# Patient Record
Sex: FEMALE | Race: WHITE | Employment: OTHER | URBAN - METROPOLITAN AREA
[De-identification: names, ages, dates, MRNs, and addresses within clinical notes are randomized per-mention and may not be internally consistent; named-entity substitution may affect disease eponyms.]

---

## 2018-03-12 ENCOUNTER — HOSPITAL ENCOUNTER (OUTPATIENT)
Age: 73
Discharge: HOME | End: 2018-03-14
Payer: COMMERCIAL

## 2018-03-12 ENCOUNTER — NURSE ONLY (OUTPATIENT)
Dept: NON INVASIVE DIAGNOSTICS | Age: 73
End: 2018-03-12

## 2018-03-12 DIAGNOSIS — I48.4 ATYPICAL ATRIAL FLUTTER (HCC): ICD-10-CM

## 2018-03-12 LAB
ANION GAP SERPL CALCULATED.3IONS-SCNC: 12 MMOL/L (ref 7–16)
BUN BLDV-MCNC: 17 MG/DL (ref 8–23)
CALCIUM SERPL-MCNC: 9.2 MG/DL (ref 8.6–10.2)
CHLORIDE BLD-SCNC: 103 MMOL/L (ref 98–107)
CO2: 28 MMOL/L (ref 22–29)
CREAT SERPL-MCNC: 0.9 MG/DL (ref 0.5–1)
GFR AFRICAN AMERICAN: >60
GFR NON-AFRICAN AMERICAN: >60 ML/MIN/1.73
GLUCOSE BLD-MCNC: 110 MG/DL (ref 74–109)
POTASSIUM SERPL-SCNC: 5.6 MMOL/L (ref 3.5–5)
SODIUM BLD-SCNC: 143 MMOL/L (ref 132–146)

## 2018-03-12 PROCEDURE — 80048 BASIC METABOLIC PNL TOTAL CA: CPT

## 2018-03-13 ENCOUNTER — HOSPITAL ENCOUNTER (OUTPATIENT)
Dept: MAMMOGRAPHY | Age: 73
Discharge: HOME | End: 2018-03-15
Payer: COMMERCIAL

## 2018-03-13 DIAGNOSIS — Z12.39 BREAST CANCER SCREENING: ICD-10-CM

## 2018-03-13 PROCEDURE — 77067 SCR MAMMO BI INCL CAD: CPT

## 2018-03-20 ENCOUNTER — TELEPHONE (OUTPATIENT)
Dept: NON INVASIVE DIAGNOSTICS | Age: 73
End: 2018-03-20

## 2018-03-20 DIAGNOSIS — I48.19 PERSISTENT ATRIAL FIBRILLATION (HCC): Primary | ICD-10-CM

## 2018-07-30 ENCOUNTER — HOSPITAL ENCOUNTER (OUTPATIENT)
Dept: INFUSION THERAPY | Age: 73
Discharge: HOME | End: 2018-07-30
Payer: COMMERCIAL

## 2018-07-30 ENCOUNTER — OFFICE VISIT (OUTPATIENT)
Dept: ONCOLOGY | Age: 73
End: 2018-07-30
Payer: COMMERCIAL

## 2018-07-30 VITALS
BODY MASS INDEX: 27.88 KG/M2 | DIASTOLIC BLOOD PRESSURE: 74 MMHG | RESPIRATION RATE: 20 BRPM | TEMPERATURE: 98 F | WEIGHT: 173.5 LBS | HEART RATE: 65 BPM | SYSTOLIC BLOOD PRESSURE: 113 MMHG | HEIGHT: 66 IN

## 2018-07-30 DIAGNOSIS — C50.911 BREAST CANCER METASTASIZED TO AXILLARY LYMPH NODE, RIGHT (HCC): Primary | ICD-10-CM

## 2018-07-30 DIAGNOSIS — C50.911 CARCINOMA OF RIGHT BREAST METASTATIC TO AXILLARY LYMPH NODE (HCC): ICD-10-CM

## 2018-07-30 DIAGNOSIS — C77.3 BREAST CANCER METASTASIZED TO AXILLARY LYMPH NODE, RIGHT (HCC): Primary | ICD-10-CM

## 2018-07-30 DIAGNOSIS — C77.3 CARCINOMA OF RIGHT BREAST METASTATIC TO AXILLARY LYMPH NODE (HCC): ICD-10-CM

## 2018-07-30 DIAGNOSIS — C50.919 CARCINOMA OF BREAST METASTATIC TO AXILLARY LYMPH NODE, UNSPECIFIED LATERALITY (HCC): Primary | ICD-10-CM

## 2018-07-30 DIAGNOSIS — C77.3 CARCINOMA OF BREAST METASTATIC TO AXILLARY LYMPH NODE, UNSPECIFIED LATERALITY (HCC): Primary | ICD-10-CM

## 2018-07-30 DIAGNOSIS — D50.8 OTHER IRON DEFICIENCY ANEMIA: ICD-10-CM

## 2018-07-30 LAB
ALBUMIN SERPL-MCNC: 3.3 G/DL (ref 3.5–5.2)
ALP BLD-CCNC: 100 U/L (ref 35–104)
ALT SERPL-CCNC: 5 U/L (ref 0–32)
ANION GAP SERPL CALCULATED.3IONS-SCNC: 9 MMOL/L (ref 7–16)
ANISOCYTOSIS: ABNORMAL
AST SERPL-CCNC: 14 U/L (ref 0–31)
BASOPHILS ABSOLUTE: 0.05 E9/L (ref 0–0.2)
BASOPHILS RELATIVE PERCENT: 0.7 % (ref 0–2)
BILIRUB SERPL-MCNC: 0.6 MG/DL (ref 0–1.2)
BUN BLDV-MCNC: 12 MG/DL (ref 8–23)
BURR CELLS: ABNORMAL
CALCIUM SERPL-MCNC: 8.7 MG/DL (ref 8.6–10.2)
CHLORIDE BLD-SCNC: 103 MMOL/L (ref 98–107)
CO2: 28 MMOL/L (ref 22–29)
CREAT SERPL-MCNC: 0.8 MG/DL (ref 0.5–1)
EOSINOPHILS ABSOLUTE: 0.23 E9/L (ref 0.05–0.5)
EOSINOPHILS RELATIVE PERCENT: 3.4 % (ref 0–6)
GFR AFRICAN AMERICAN: >60
GFR NON-AFRICAN AMERICAN: >60 ML/MIN/1.73
GLUCOSE BLD-MCNC: 128 MG/DL (ref 74–109)
HCT VFR BLD CALC: 38.2 % (ref 34–48)
HEMOGLOBIN: 10.9 G/DL (ref 11.5–15.5)
HYPOCHROMIA: ABNORMAL
IMMATURE GRANULOCYTES #: 0.02 E9/L
IMMATURE GRANULOCYTES %: 0.3 % (ref 0–5)
LYMPHOCYTES ABSOLUTE: 2.01 E9/L (ref 1.5–4)
LYMPHOCYTES RELATIVE PERCENT: 29.3 % (ref 20–42)
MCH RBC QN AUTO: 23.7 PG (ref 26–35)
MCHC RBC AUTO-ENTMCNC: 28.5 % (ref 32–34.5)
MCV RBC AUTO: 83.2 FL (ref 80–99.9)
MONOCYTES ABSOLUTE: 0.58 E9/L (ref 0.1–0.95)
MONOCYTES RELATIVE PERCENT: 8.5 % (ref 2–12)
NEUTROPHILS ABSOLUTE: 3.97 E9/L (ref 1.8–7.3)
NEUTROPHILS RELATIVE PERCENT: 57.8 % (ref 43–80)
OVALOCYTES: ABNORMAL
PDW BLD-RTO: 22.4 FL (ref 11.5–15)
PLATELET # BLD: 239 E9/L (ref 130–450)
PMV BLD AUTO: 9.9 FL (ref 7–12)
POIKILOCYTES: ABNORMAL
POLYCHROMASIA: ABNORMAL
POTASSIUM SERPL-SCNC: 3.7 MMOL/L (ref 3.5–5)
RBC # BLD: 4.59 E12/L (ref 3.5–5.5)
SODIUM BLD-SCNC: 140 MMOL/L (ref 132–146)
TOTAL PROTEIN: 6.5 G/DL (ref 6.4–8.3)
WBC # BLD: 6.9 E9/L (ref 4.5–11.5)

## 2018-07-30 PROCEDURE — 2580000003 HC RX 258: Performed by: INTERNAL MEDICINE

## 2018-07-30 PROCEDURE — 99214 OFFICE O/P EST MOD 30 MIN: CPT | Performed by: INTERNAL MEDICINE

## 2018-07-30 PROCEDURE — 85025 COMPLETE CBC W/AUTO DIFF WBC: CPT

## 2018-07-30 PROCEDURE — 86300 IMMUNOASSAY TUMOR CA 15-3: CPT

## 2018-07-30 PROCEDURE — 6360000002 HC RX W HCPCS: Performed by: INTERNAL MEDICINE

## 2018-07-30 PROCEDURE — 80053 COMPREHEN METABOLIC PANEL: CPT

## 2018-07-30 RX ORDER — SODIUM CHLORIDE 0.9 % (FLUSH) 0.9 %
10 SYRINGE (ML) INJECTION PRN
Status: CANCELLED | OUTPATIENT
Start: 2018-07-30

## 2018-07-30 RX ORDER — HEPARIN SODIUM (PORCINE) LOCK FLUSH IV SOLN 100 UNIT/ML 100 UNIT/ML
500 SOLUTION INTRAVENOUS PRN
Status: CANCELLED | OUTPATIENT
Start: 2018-07-30

## 2018-07-30 RX ORDER — SODIUM CHLORIDE 0.9 % (FLUSH) 0.9 %
10 SYRINGE (ML) INJECTION PRN
Status: DISCONTINUED | OUTPATIENT
Start: 2018-07-30 | End: 2018-07-31 | Stop reason: HOSPADM

## 2018-07-30 RX ORDER — HEPARIN SODIUM (PORCINE) LOCK FLUSH IV SOLN 100 UNIT/ML 100 UNIT/ML
500 SOLUTION INTRAVENOUS PRN
Status: DISCONTINUED | OUTPATIENT
Start: 2018-07-30 | End: 2018-07-31 | Stop reason: HOSPADM

## 2018-07-30 RX ORDER — FERROUS SULFATE 325(65) MG
325 TABLET ORAL 2 TIMES DAILY
Status: ON HOLD | COMMUNITY
End: 2021-02-11 | Stop reason: HOSPADM

## 2018-07-30 RX ORDER — CEFUROXIME AXETIL 500 MG/1
500 TABLET ORAL 2 TIMES DAILY
COMMUNITY
End: 2019-01-15 | Stop reason: ALTCHOICE

## 2018-07-30 RX ADMIN — Medication 500 UNITS: at 11:07

## 2018-07-30 RX ADMIN — Medication 10 ML: at 11:07

## 2018-07-30 NOTE — PROGRESS NOTES
PORT FLUSH    Patient presents to clinic for [facility] today. 0.75 inch  SQ port accessed per policy using 20 doe Irby needle for good blood return. Aspirate for waste and specimen sent to lab. Site flushed easily with 10 mL NSS followed by 5 mL Heparin solution 100 units/ml rinse prior to de-access. Dry sterile dressing to area. Tolerated procedure well. Encouraged to schedule port flush every 4 weeks.

## 2018-07-30 NOTE — PROGRESS NOTES
taking differently: Take 20 mg by mouth daily as needed  2/26/18   Telma De La Vega APRN - CNS   PARoxetine (PAXIL) 20 MG tablet Take 1 tablet by mouth every morning 10/4/17   Jordon Hooks MD   atorvastatin (LIPITOR) 10 MG tablet Take 10 mg by mouth daily    Historical Provider, MD   lisinopril (PRINIVIL;ZESTRIL) 5 MG tablet TAKE 1 TABLET BY MOUTH ONCE DAILY 8/10/16   Vivi Obrien MD   ALPRAZolam Marlan Grieve) 0.25 MG tablet Take 0.25 mg by mouth as needed  3/24/16   Historical Provider, MD   vitamin D (CHOLECALCIFEROL) 400 UNITS TABS tablet Take 400 Units by mouth daily. Historical Provider, MD   therapeutic multivitamin-minerals (THERAGRAN-M) tablet Take 1 tablet by mouth daily. Historical Provider, MD   calcium carbonate (OSCAL) 500 MG TABS tablet Take 500 mg by mouth daily.       Historical Provider, MD    Scheduled Meds:  Continuous Infusions:  PRN Meds:.        Recent Laboratory Data-     Lab Results   Component Value Date    WBC 6.5 10/04/2017    HGB 11.9 10/04/2017    HCT 39.4 10/04/2017    MCV 88.7 10/04/2017     10/04/2017    LYMPHOPCT 33.4 10/04/2017    RBC 4.44 10/04/2017    MCH 26.8 10/04/2017    MCHC 30.2 (L) 10/04/2017    RDW 15.1 (H) 10/04/2017    NEUTOPHILPCT 48.9 10/04/2017    MONOPCT 13.9 (H) 10/04/2017    EOSPCT 3 10/06/2010    BASOPCT 0.5 10/04/2017    NEUTROABS 3.20 10/04/2017    LYMPHSABS 2.18 10/04/2017    MONOSABS 0.91 10/04/2017    EOSABS 0.20 10/04/2017    BASOSABS 0.03 10/04/2017       Lab Results   Component Value Date     03/12/2018    K 5.6 (H) 03/12/2018     03/12/2018    CO2 28 03/12/2018    BUN 17 03/12/2018    CREATININE 0.9 03/12/2018    GLUCOSE 110 (H) 03/12/2018    CALCIUM 9.2 03/12/2018    PROT 6.7 10/04/2017    LABALBU 3.4 (L) 10/04/2017    BILITOT 0.6 10/04/2017    ALKPHOS 108 (H) 10/04/2017    AST 14 10/04/2017    ALT 9 10/04/2017    LABGLOM >60 03/12/2018    GFRAA >60 03/12/2018         Lab Results   Component Value Date    IRON 51 04/11/2016 TIBC 299 04/11/2016    FERRITIN 32 04/11/2016          Ref. Range 10/1/2014 13:04 3/9/2015 12:02 8/22/2016 15:30 3/29/2017 14:44 10/4/2017 14:03   CA 15-3 Latest Ref Range: 0 - 31 U/mL 19 19 17 19 21         Lab Results   Component Value Date    CEA 0.5 03/02/2011         Radiology-  LEFT SCREENING MAMMOGRAM:  3/13/18  IMPRESSION:   No mammographic evidence of malignancy.       Screening mammogram in 1 year is recommended. ASSESSMENT/PLAN :  1- Inflammatory breast cancer at the time of her Diagnosis in June 2009, with extensive involvement of the right chest wall, axillary lymph node, right supraclavicular lymphadenopathy, with significant lymphedema. She received neoadjuvant chemotherapy with AC, followed sequentially by Taxol, carboplatin, and Herceptin, and achieved a complete remission. Then she received a course of local radiation therapy to the chest. She completed her Herceptin. She then took 5 years of anastrozole 1 mg p.o. Daily which was completed in March 2015 and is doing well without definite evidence of disease recurrence. Her mediastinal lymph nodes are likely inflammatory and reactive,she has worked in a [employer] 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  PET scan in June 2014 was negative with no abnormal metabolic uptake  Recent mammogram from June 2014 was negative and Dexa scan showed normal bone density. Follow-up mammogram in FEB 2017 was negative        2- Anemia by history ,she has been on ASA and Xarelto the past few months but denies any bleeding manifestations. Blood smear and Iron studies were consistent with iron deficiency. Stools for occult blood were positive. Her colonoscopy done in November 2014 was negative  It is likely that she has intermittent GI blood losses in relation to Xarelto. Her stools for occult blood were repeated again and were positive X3,   She did benefit from parenteral IV iron.   Her anemia is also contributed to by chronic kidney disease stage III. Should her hemoglobin drop below 10 she will benefit from erythropoietin stimulating agents with Aranesp  She was seen by cardiology and for the time being she is maintained on Xarelto. Her hemoglobin Has declined lately and is down to 10.9 today. She has been maintained on oral iron supplements. Iron studies were repeated today and are pending and if her hemoglobin continues to drop she will be considered for parenteral IV iron      .. Rod Hyatt. Jennifer Berkowitz M.D., F.A.C.P.   Electronically signed 7/30/2018 at 7:11 AM

## 2018-07-31 LAB — CA 15-3: 25 U/ML (ref 0–31)

## 2018-08-28 ENCOUNTER — OFFICE VISIT (OUTPATIENT)
Dept: NON INVASIVE DIAGNOSTICS | Age: 73
End: 2018-08-28
Payer: COMMERCIAL

## 2018-08-28 VITALS
WEIGHT: 176.4 LBS | RESPIRATION RATE: 18 BRPM | BODY MASS INDEX: 28.35 KG/M2 | DIASTOLIC BLOOD PRESSURE: 70 MMHG | HEIGHT: 66 IN | HEART RATE: 57 BPM | SYSTOLIC BLOOD PRESSURE: 110 MMHG

## 2018-08-28 DIAGNOSIS — I48.91 ATRIAL FIBRILLATION, UNSPECIFIED TYPE (HCC): ICD-10-CM

## 2018-08-28 DIAGNOSIS — R93.89 ABNORMAL RADIOGRAPH: ICD-10-CM

## 2018-08-28 DIAGNOSIS — R59.9 ADENOPATHY: ICD-10-CM

## 2018-08-28 DIAGNOSIS — I48.91 ATRIAL FIBRILLATION WITH RVR (HCC): Primary | ICD-10-CM

## 2018-08-28 DIAGNOSIS — I43 TACHYCARDIA INDUCED CARDIOMYOPATHY (HCC): ICD-10-CM

## 2018-08-28 DIAGNOSIS — R00.0 TACHYCARDIA INDUCED CARDIOMYOPATHY (HCC): ICD-10-CM

## 2018-08-28 PROCEDURE — 99213 OFFICE O/P EST LOW 20 MIN: CPT | Performed by: INTERNAL MEDICINE

## 2018-08-28 PROCEDURE — 93000 ELECTROCARDIOGRAM COMPLETE: CPT | Performed by: INTERNAL MEDICINE

## 2018-08-28 RX ORDER — FUROSEMIDE 20 MG/1
20 TABLET ORAL DAILY PRN
Qty: 30 TABLET | Refills: 5 | Status: ON HOLD | OUTPATIENT
Start: 2018-08-28 | End: 2019-01-18 | Stop reason: HOSPADM

## 2018-08-28 RX ORDER — METOPROLOL SUCCINATE 25 MG/1
25 TABLET, EXTENDED RELEASE ORAL DAILY
Qty: 90 TABLET | Refills: 3 | Status: SHIPPED | OUTPATIENT
Start: 2018-08-28 | End: 2018-08-28 | Stop reason: SDUPTHER

## 2018-08-28 RX ORDER — POTASSIUM CHLORIDE 20 MEQ/1
20 TABLET, EXTENDED RELEASE ORAL DAILY PRN
Qty: 30 TABLET | Refills: 5 | Status: ON HOLD
Start: 2018-08-28 | End: 2021-02-11 | Stop reason: HOSPADM

## 2018-08-28 RX ORDER — METOPROLOL SUCCINATE 25 MG/1
12.5 TABLET, EXTENDED RELEASE ORAL DAILY
Qty: 60 TABLET | Refills: 3 | Status: ON HOLD | OUTPATIENT
Start: 2018-08-28 | End: 2019-05-31 | Stop reason: HOSPADM

## 2018-08-28 RX ORDER — DOFETILIDE 0.25 MG/1
250 CAPSULE ORAL EVERY 12 HOURS SCHEDULED
Qty: 180 CAPSULE | Refills: 3 | Status: SHIPPED | OUTPATIENT
Start: 2018-08-28 | End: 2019-09-10 | Stop reason: SDUPTHER

## 2018-08-28 NOTE — PROGRESS NOTES
mouth every morning 90 tablet 1    atorvastatin (LIPITOR) 10 MG tablet Take 10 mg by mouth daily      lisinopril (PRINIVIL;ZESTRIL) 5 MG tablet TAKE 1 TABLET BY MOUTH ONCE DAILY 90 tablet 3    ALPRAZolam (XANAX) 0.25 MG tablet Take 0.25 mg by mouth as needed       vitamin D (CHOLECALCIFEROL) 400 UNITS TABS tablet Take 400 Units by mouth daily.  therapeutic multivitamin-minerals (THERAGRAN-M) tablet Take 1 tablet by mouth daily.  calcium carbonate (OSCAL) 500 MG TABS tablet Take 500 mg by mouth daily. No current facility-administered medications for this visit. Allergies   Allergen Reactions    Sulfa Antibiotics        SUBJECTIVE: Reinaldo Louis presents to the office today with these chronic medical conditions: Persistent AF on Tikosyn AAD therapy, previous Sotalol AAD therapy with ERAF, H/O breast carcinoma, sinus bradycardia and anemia. She has been feeling well from a device and rhythm perspective and offers no complaints today. She denies any symptoms of chest pain, shortness of breath, orthopnea or PND. She denies any palpitations or feelings of rapid heart rhythms. She does admit to occasional lower extremity edema has asked for refills of Lasix and potassium as needed. ROS:   Constitutional: Negative for fever, activity change and appetite change. HENT: Negative for epistaxis, difficulty swallowing. Eyes: Negative for blurred vision or double vision. Respiratory: Negative for cough, chest tightness, shortness of breath and wheezing. Cardiovascular: Negative for chest pain, palpitations and leg swelling. Gastrointestinal: Negative for abdominal pain, heartburn. Genitourinary: Negative for hematuria, burning or frequency. Musculoskeletal: Negative for myalgias, stiffness, or swelling. Skin: Negative for rash, pain, or lumps. Neurological: Negative for syncope, seizures, or headaches, dizziness  Psychiatric/Behavioral: Negative for confusion and agitation.  The patient is not nervous/anxious. PHYSICAL EXAM:  Vitals:    08/28/18 1109   BP: 110/70   Pulse: 57   Resp: 18   Weight: 176 lb 6.4 oz (80 kg)   Height: 5' 6\" (1.676 m)     Constitutional: Oriented to person, place, and time. Well-developed and cooperative. Head: Normocephalic and atraumatic. Eyes: Conjunctivae are normal.    Neck: No hepatojugular reflux and no JVD present. Cardiovascular: S1 normal, S2 normal and intact distal pulses. A regular bradycardic rhythm present. Pulmonary/Chest: Effort normal and breath sounds normal. No respiratory distress. Abdominal: Soft. Normal appearance and bowel sounds are normal.  No tenderness. Musculoskeletal: Normal range of motion of all extremities, no muscle weakness. Neurological: Alert and oriented to person, place, and time. Gait normal.   Skin: Skin is warm and dry. No bruising, no ecchymosis and no rash noted. Extremity: No clubbing or cyanosis. RUE lymphedema  Psychiatric: Normal mood and affect. Thought content normal.     EKG 8/28/18: Sinus bradycardia at 57 bpm, NAD, Old AWMI, T-wave abnormality, QTc: 487 ms. I have independently reviewed all of the ECGs as per above. I have reviewed all progress notes & records from the time of the patient's last office visit up to present. Impression:     1. Persistent atrial fibrillation with RVR   - history of prior sotalol AAD therapy with ERAF.  - Xarelto OAC.  - [id number] score 2.  - s/p CV on 9/19/16  - Tikosyn initiation 11/14/16  - s/p CV 11/16/16  - QTc is stable    2. 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 Normal coronary arteries. Global LV hypokinesis, EF 40%. 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  - Sotalol d/c'd - Toprol XL resumed  - Repeat echocardiogram(May 2014): EF 55%.   - Repeat echocardiogram (6/24/15): EF >55%, LVPW(d) 1 cm and

## 2018-09-19 ENCOUNTER — HOSPITAL ENCOUNTER (OUTPATIENT)
Age: 73
Discharge: HOME | End: 2018-09-21
Payer: COMMERCIAL

## 2018-09-19 LAB
BUN BLDV-MCNC: 15 MG/DL (ref 8–23)
CREAT SERPL-MCNC: 1 MG/DL (ref 0.5–1)
GFR AFRICAN AMERICAN: >60
GFR NON-AFRICAN AMERICAN: 55 ML/MIN/1.73

## 2018-09-19 PROCEDURE — 82565 ASSAY OF CREATININE: CPT

## 2018-09-19 PROCEDURE — 84520 ASSAY OF UREA NITROGEN: CPT

## 2018-09-19 PROCEDURE — 88112 CYTOPATH CELL ENHANCE TECH: CPT

## 2018-10-02 ENCOUNTER — HOSPITAL ENCOUNTER (OUTPATIENT)
Dept: CT IMAGING | Age: 73
Discharge: HOME | End: 2018-10-04
Payer: COMMERCIAL

## 2018-10-02 DIAGNOSIS — R31.21 ASYMPTOMATIC MICROSCOPIC HEMATURIA: ICD-10-CM

## 2018-10-02 PROCEDURE — 74178 CT ABD&PLV WO CNTR FLWD CNTR: CPT

## 2018-10-02 PROCEDURE — 6360000004 HC RX CONTRAST MEDICATION: Performed by: RADIOLOGY

## 2018-10-02 RX ADMIN — IOPAMIDOL 110 ML: 755 INJECTION, SOLUTION INTRAVENOUS at 13:00

## 2018-10-11 ENCOUNTER — OFFICE VISIT (OUTPATIENT)
Dept: CARDIOLOGY CLINIC | Age: 73
End: 2018-10-11
Payer: COMMERCIAL

## 2018-10-11 VITALS
WEIGHT: 175.2 LBS | DIASTOLIC BLOOD PRESSURE: 68 MMHG | SYSTOLIC BLOOD PRESSURE: 112 MMHG | RESPIRATION RATE: 16 BRPM | HEIGHT: 66 IN | BODY MASS INDEX: 28.16 KG/M2 | HEART RATE: 79 BPM

## 2018-10-11 DIAGNOSIS — I42.8 NICM (NONISCHEMIC CARDIOMYOPATHY) (HCC): ICD-10-CM

## 2018-10-11 DIAGNOSIS — Z79.01 CHRONIC ANTICOAGULATION: ICD-10-CM

## 2018-10-11 DIAGNOSIS — I48.19 PERSISTENT ATRIAL FIBRILLATION (HCC): Primary | ICD-10-CM

## 2018-10-11 DIAGNOSIS — I48.91 ATRIAL FIBRILLATION WITH RVR (HCC): ICD-10-CM

## 2018-10-11 DIAGNOSIS — Z01.810 PREOP CARDIOVASCULAR EXAM: ICD-10-CM

## 2018-10-11 PROCEDURE — 93000 ELECTROCARDIOGRAM COMPLETE: CPT | Performed by: INTERNAL MEDICINE

## 2018-10-11 PROCEDURE — 99214 OFFICE O/P EST MOD 30 MIN: CPT | Performed by: INTERNAL MEDICINE

## 2018-10-11 NOTE — PROGRESS NOTES
OUTPATIENT CARDIOLOGY PROGRESS NOTE    Catina Gamboa  1945     Date of Service: 10/11/18     Referring Provider/PCP: Corie Grullon DO     Chief Complaint: Preoperative cardiac evaluation (screening colonoscopy)    Patient Active Problem List    Diagnosis Date Noted    Atrial fibrillation with RVR ([facility] 04/07/2014     Priority: High     Overview Note:     A). S/P MINE/CV 4/4/14: initial conversion to SR then back into AF   B). Initiation of Sotalol AA drug therapy s/p CV  C). Repeat successful CV post AA drug loading 4/7/14  D). 24-hour Holter(8/3/15): PAF, SR/SB, No AVB, no evidence of chronotropic incompetence. No significant pauses. E). ERAF on sotalol  F) Current Tikosyn AAD therapy    Chronic anticoagulation 05/15/2014     Priority: Medium     Overview Note:     A). Xarelto      Breast cancer metastasized to axillary lymph node ([facility] 03/26/2015    Iron deficiency anemia 03/16/2015    Dyspnea on exertion 04/07/2014    Adenopathy 06/19/2012    Malignant neoplasm of breast ([facility] 06/19/2012     Overview Note:     replace inactive diagnosis         Current Outpatient Prescriptions   Medication Sig Dispense Refill    dofetilide (TIKOSYN) 250 MCG capsule Take 1 capsule by mouth every 12 hours 180 capsule 3    furosemide (LASIX) 20 MG tablet Take 1 tablet by mouth daily as needed (edema) 30 tablet 5    potassium chloride (KLOR-CON M) 20 MEQ extended release tablet Take 1 tablet by mouth daily as needed (take with Lasix) 30 tablet 5    ferrous sulfate 325 (65 Fe) MG tablet Take 325 mg by mouth 2 times daily      rivaroxaban (XARELTO) 20 MG TABS tablet Take 1 tablet by mouth daily.  90 tablet 3    PARoxetine (PAXIL) 20 MG tablet Take 1 tablet by mouth every morning 90 tablet 1    atorvastatin (LIPITOR) 10 MG tablet Take 10 mg by mouth daily      lisinopril (PRINIVIL;ZESTRIL) 5 MG tablet TAKE 1 TABLET BY MOUTH ONCE DAILY 90 tablet 3    ALPRAZolam (XANAX) 0.25 MG tablet Take 0.25 mg by mouth as needed

## 2018-11-14 ENCOUNTER — TELEPHONE (OUTPATIENT)
Dept: CARDIOLOGY CLINIC | Age: 73
End: 2018-11-14

## 2018-11-14 NOTE — TELEPHONE ENCOUNTER
[facility] states that Kandace Stapleton can have colonoscopy. Under the assessment you have she is to continue medications. They need to have it specifically stated that she can stop her Xarelto for 2 days prior to EGD and colonoscopy , they are going to have her bridge with asa,  both scheduled the same day.

## 2019-01-15 ENCOUNTER — HOSPITAL ENCOUNTER (INPATIENT)
Age: 74
LOS: 3 days | Discharge: HOME | DRG: 292 | End: 2019-01-18
Attending: EMERGENCY MEDICINE | Admitting: FAMILY MEDICINE
Payer: COMMERCIAL

## 2019-01-15 ENCOUNTER — APPOINTMENT (OUTPATIENT)
Dept: ULTRASOUND IMAGING | Age: 74
DRG: 292 | End: 2019-01-15
Payer: COMMERCIAL

## 2019-01-15 ENCOUNTER — APPOINTMENT (OUTPATIENT)
Dept: GENERAL RADIOLOGY | Age: 74
DRG: 292 | End: 2019-01-15
Payer: COMMERCIAL

## 2019-01-15 DIAGNOSIS — I48.91 ATRIAL FIBRILLATION WITH RVR (HCC): Primary | ICD-10-CM

## 2019-01-15 DIAGNOSIS — I50.9 ACUTE CONGESTIVE HEART FAILURE, UNSPECIFIED HEART FAILURE TYPE (HCC): ICD-10-CM

## 2019-01-15 PROBLEM — J90 BILATERAL PLEURAL EFFUSION: Status: ACTIVE | Noted: 2019-01-15

## 2019-01-15 LAB
ALBUMIN SERPL-MCNC: 3.9 G/DL (ref 3.5–5.2)
ALP BLD-CCNC: 73 U/L (ref 35–104)
ALT SERPL-CCNC: 11 U/L (ref 0–32)
ANION GAP SERPL CALCULATED.3IONS-SCNC: 14 MMOL/L (ref 7–16)
ANISOCYTOSIS: ABNORMAL
APTT: 41.1 SEC (ref 24.5–35.1)
AST SERPL-CCNC: 20 U/L (ref 0–31)
BASOPHILS ABSOLUTE: 0.04 E9/L (ref 0–0.2)
BASOPHILS RELATIVE PERCENT: 0.6 % (ref 0–2)
BILIRUB SERPL-MCNC: 1.2 MG/DL (ref 0–1.2)
BUN BLDV-MCNC: 22 MG/DL (ref 8–23)
CALCIUM SERPL-MCNC: 9.3 MG/DL (ref 8.6–10.2)
CHLORIDE BLD-SCNC: 103 MMOL/L (ref 98–107)
CO2: 26 MMOL/L (ref 22–29)
CREAT SERPL-MCNC: 0.8 MG/DL (ref 0.5–1)
EOSINOPHILS ABSOLUTE: 0.04 E9/L (ref 0.05–0.5)
EOSINOPHILS RELATIVE PERCENT: 0.6 % (ref 0–6)
GFR AFRICAN AMERICAN: >60
GFR NON-AFRICAN AMERICAN: >60 ML/MIN/1.73
GLUCOSE BLD-MCNC: 95 MG/DL (ref 74–99)
HCT VFR BLD CALC: 50.9 % (ref 34–48)
HEMOGLOBIN: 14.7 G/DL (ref 11.5–15.5)
IMMATURE GRANULOCYTES #: 0.03 E9/L
IMMATURE GRANULOCYTES %: 0.4 % (ref 0–5)
INR BLD: 3.9
LYMPHOCYTES ABSOLUTE: 1.63 E9/L (ref 1.5–4)
LYMPHOCYTES RELATIVE PERCENT: 23.2 % (ref 20–42)
MCH RBC QN AUTO: 27.8 PG (ref 26–35)
MCHC RBC AUTO-ENTMCNC: 28.9 % (ref 32–34.5)
MCV RBC AUTO: 96.4 FL (ref 80–99.9)
MONOCYTES ABSOLUTE: 0.63 E9/L (ref 0.1–0.95)
MONOCYTES RELATIVE PERCENT: 8.9 % (ref 2–12)
NEUTROPHILS ABSOLUTE: 4.67 E9/L (ref 1.8–7.3)
NEUTROPHILS RELATIVE PERCENT: 66.3 % (ref 43–80)
OVALOCYTES: ABNORMAL
PDW BLD-RTO: 18.5 FL (ref 11.5–15)
PLATELET # BLD: 184 E9/L (ref 130–450)
PMV BLD AUTO: 9.9 FL (ref 7–12)
POIKILOCYTES: ABNORMAL
POLYCHROMASIA: ABNORMAL
POTASSIUM REFLEX MAGNESIUM: 4.5 MMOL/L (ref 3.5–5)
PRO-BNP: 4647 PG/ML (ref 0–125)
PROTHROMBIN TIME: 43.1 SEC (ref 9.3–12.4)
RBC # BLD: 5.28 E12/L (ref 3.5–5.5)
SODIUM BLD-SCNC: 143 MMOL/L (ref 132–146)
TOTAL PROTEIN: 7.2 G/DL (ref 6.4–8.3)
TROPONIN: <0.01 NG/ML (ref 0–0.03)
TSH SERPL DL<=0.05 MIU/L-ACNC: 2.14 UIU/ML (ref 0.27–4.2)
WBC # BLD: 7 E9/L (ref 4.5–11.5)

## 2019-01-15 PROCEDURE — 6370000000 HC RX 637 (ALT 250 FOR IP): Performed by: FAMILY MEDICINE

## 2019-01-15 PROCEDURE — 2500000003 HC RX 250 WO HCPCS: Performed by: NURSE PRACTITIONER

## 2019-01-15 PROCEDURE — 2060000000 HC ICU INTERMEDIATE R&B

## 2019-01-15 PROCEDURE — 85025 COMPLETE CBC W/AUTO DIFF WBC: CPT

## 2019-01-15 PROCEDURE — 96376 TX/PRO/DX INJ SAME DRUG ADON: CPT

## 2019-01-15 PROCEDURE — 96375 TX/PRO/DX INJ NEW DRUG ADDON: CPT

## 2019-01-15 PROCEDURE — 6360000002 HC RX W HCPCS: Performed by: FAMILY MEDICINE

## 2019-01-15 PROCEDURE — 2580000003 HC RX 258: Performed by: FAMILY MEDICINE

## 2019-01-15 PROCEDURE — 85730 THROMBOPLASTIN TIME PARTIAL: CPT

## 2019-01-15 PROCEDURE — 84443 ASSAY THYROID STIM HORMONE: CPT

## 2019-01-15 PROCEDURE — 84484 ASSAY OF TROPONIN QUANT: CPT

## 2019-01-15 PROCEDURE — 80053 COMPREHEN METABOLIC PANEL: CPT

## 2019-01-15 PROCEDURE — 93971 EXTREMITY STUDY: CPT

## 2019-01-15 PROCEDURE — 36415 COLL VENOUS BLD VENIPUNCTURE: CPT

## 2019-01-15 PROCEDURE — 2500000003 HC RX 250 WO HCPCS: Performed by: EMERGENCY MEDICINE

## 2019-01-15 PROCEDURE — 71045 X-RAY EXAM CHEST 1 VIEW: CPT

## 2019-01-15 PROCEDURE — 85610 PROTHROMBIN TIME: CPT

## 2019-01-15 PROCEDURE — 2580000003 HC RX 258: Performed by: EMERGENCY MEDICINE

## 2019-01-15 PROCEDURE — 96365 THER/PROPH/DIAG IV INF INIT: CPT

## 2019-01-15 PROCEDURE — 83880 ASSAY OF NATRIURETIC PEPTIDE: CPT

## 2019-01-15 PROCEDURE — 99285 EMERGENCY DEPT VISIT HI MDM: CPT

## 2019-01-15 RX ORDER — DILTIAZEM HYDROCHLORIDE 5 MG/ML
10 INJECTION INTRAVENOUS ONCE
Status: DISCONTINUED | OUTPATIENT
Start: 2019-01-15 | End: 2019-01-15

## 2019-01-15 RX ORDER — ONDANSETRON 2 MG/ML
4 INJECTION INTRAMUSCULAR; INTRAVENOUS EVERY 6 HOURS PRN
Status: DISCONTINUED | OUTPATIENT
Start: 2019-01-15 | End: 2019-01-18 | Stop reason: HOSPADM

## 2019-01-15 RX ORDER — DILTIAZEM HYDROCHLORIDE 5 MG/ML
20 INJECTION INTRAVENOUS ONCE
Status: COMPLETED | OUTPATIENT
Start: 2019-01-15 | End: 2019-01-15

## 2019-01-15 RX ORDER — FAMOTIDINE 20 MG/1
20 TABLET, FILM COATED ORAL 2 TIMES DAILY
Status: DISCONTINUED | OUTPATIENT
Start: 2019-01-15 | End: 2019-01-18 | Stop reason: HOSPADM

## 2019-01-15 RX ORDER — SODIUM CHLORIDE 0.9 % (FLUSH) 0.9 %
10 SYRINGE (ML) INJECTION EVERY 12 HOURS SCHEDULED
Status: DISCONTINUED | OUTPATIENT
Start: 2019-01-15 | End: 2019-01-18 | Stop reason: HOSPADM

## 2019-01-15 RX ORDER — DOFETILIDE 0.25 MG/1
250 CAPSULE ORAL EVERY 12 HOURS SCHEDULED
Status: DISCONTINUED | OUTPATIENT
Start: 2019-01-15 | End: 2019-01-18 | Stop reason: HOSPADM

## 2019-01-15 RX ORDER — LISINOPRIL 5 MG/1
5 TABLET ORAL DAILY
Status: DISCONTINUED | OUTPATIENT
Start: 2019-01-16 | End: 2019-01-18 | Stop reason: HOSPADM

## 2019-01-15 RX ORDER — ATORVASTATIN CALCIUM 10 MG/1
10 TABLET, FILM COATED ORAL NIGHTLY
Status: DISCONTINUED | OUTPATIENT
Start: 2019-01-15 | End: 2019-01-18 | Stop reason: HOSPADM

## 2019-01-15 RX ORDER — FUROSEMIDE 10 MG/ML
40 INJECTION INTRAMUSCULAR; INTRAVENOUS 2 TIMES DAILY
Status: DISCONTINUED | OUTPATIENT
Start: 2019-01-15 | End: 2019-01-17 | Stop reason: ALTCHOICE

## 2019-01-15 RX ORDER — SODIUM CHLORIDE 0.9 % (FLUSH) 0.9 %
10 SYRINGE (ML) INJECTION PRN
Status: DISCONTINUED | OUTPATIENT
Start: 2019-01-15 | End: 2019-01-18 | Stop reason: HOSPADM

## 2019-01-15 RX ORDER — PAROXETINE HYDROCHLORIDE 20 MG/1
20 TABLET, FILM COATED ORAL EVERY MORNING
Status: DISCONTINUED | OUTPATIENT
Start: 2019-01-16 | End: 2019-01-18 | Stop reason: HOSPADM

## 2019-01-15 RX ORDER — POTASSIUM CHLORIDE 20 MEQ/1
20 TABLET, EXTENDED RELEASE ORAL DAILY PRN
Status: DISCONTINUED | OUTPATIENT
Start: 2019-01-15 | End: 2019-01-18 | Stop reason: HOSPADM

## 2019-01-15 RX ORDER — BUMETANIDE 0.25 MG/ML
1 INJECTION, SOLUTION INTRAMUSCULAR; INTRAVENOUS ONCE
Status: COMPLETED | OUTPATIENT
Start: 2019-01-15 | End: 2019-01-15

## 2019-01-15 RX ORDER — ALPRAZOLAM 0.25 MG/1
0.25 TABLET ORAL 3 TIMES DAILY PRN
Status: DISCONTINUED | OUTPATIENT
Start: 2019-01-15 | End: 2019-01-18 | Stop reason: HOSPADM

## 2019-01-15 RX ORDER — LISINOPRIL 5 MG/1
1 TABLET ORAL DAILY
Status: DISCONTINUED | OUTPATIENT
Start: 2019-01-15 | End: 2019-01-15 | Stop reason: SDUPTHER

## 2019-01-15 RX ORDER — METOPROLOL SUCCINATE 25 MG/1
12.5 TABLET, EXTENDED RELEASE ORAL EVERY MORNING
Status: DISCONTINUED | OUTPATIENT
Start: 2019-01-16 | End: 2019-01-18 | Stop reason: HOSPADM

## 2019-01-15 RX ORDER — CALCIUM CARBONATE 500(1250)
500 TABLET ORAL EVERY MORNING
Status: DISCONTINUED | OUTPATIENT
Start: 2019-01-16 | End: 2019-01-18 | Stop reason: HOSPADM

## 2019-01-15 RX ORDER — FERROUS SULFATE 325(65) MG
325 TABLET ORAL 2 TIMES DAILY
Status: DISCONTINUED | OUTPATIENT
Start: 2019-01-15 | End: 2019-01-18 | Stop reason: HOSPADM

## 2019-01-15 RX ADMIN — FERROUS SULFATE TAB 325 MG (65 MG ELEMENTAL FE) 325 MG: 325 (65 FE) TAB at 20:50

## 2019-01-15 RX ADMIN — ATORVASTATIN CALCIUM 10 MG: 10 TABLET, FILM COATED ORAL at 20:50

## 2019-01-15 RX ADMIN — BUMETANIDE 1 MG: 0.25 INJECTION INTRAMUSCULAR; INTRAVENOUS at 14:10

## 2019-01-15 RX ADMIN — Medication 10 ML: at 20:51

## 2019-01-15 RX ADMIN — DILTIAZEM HYDROCHLORIDE 20 MG: 5 INJECTION INTRAVENOUS at 13:33

## 2019-01-15 RX ADMIN — ALPRAZOLAM 0.25 MG: 0.25 TABLET ORAL at 21:00

## 2019-01-15 RX ADMIN — DILTIAZEM HYDROCHLORIDE 5 MG/HR: 5 INJECTION INTRAVENOUS at 13:42

## 2019-01-15 RX ADMIN — RIVAROXABAN 20 MG: 20 TABLET, FILM COATED ORAL at 18:34

## 2019-01-15 RX ADMIN — DOFETILIDE 250 MCG: 0.25 CAPSULE ORAL at 20:50

## 2019-01-15 RX ADMIN — FUROSEMIDE 40 MG: 10 INJECTION, SOLUTION INTRAMUSCULAR; INTRAVENOUS at 20:50

## 2019-01-15 RX ADMIN — FAMOTIDINE 20 MG: 20 TABLET ORAL at 20:54

## 2019-01-16 ENCOUNTER — TELEPHONE (OUTPATIENT)
Dept: CARDIOLOGY CLINIC | Age: 74
End: 2019-01-16

## 2019-01-16 LAB
ANION GAP SERPL CALCULATED.3IONS-SCNC: 11 MMOL/L (ref 7–16)
BUN BLDV-MCNC: 24 MG/DL (ref 8–23)
CALCIUM SERPL-MCNC: 9 MG/DL (ref 8.6–10.2)
CHLORIDE BLD-SCNC: 101 MMOL/L (ref 98–107)
CHOLESTEROL, TOTAL: 116 MG/DL (ref 0–199)
CO2: 32 MMOL/L (ref 22–29)
CREAT SERPL-MCNC: 0.9 MG/DL (ref 0.5–1)
FILM ARRAY ADENOVIRUS: NORMAL
FILM ARRAY BORDETELLA PERTUSSIS: NORMAL
FILM ARRAY CHLAMYDOPHILIA PNEUMONIAE: NORMAL
FILM ARRAY CORONAVIRUS 229E: NORMAL
FILM ARRAY CORONAVIRUS HKU1: NORMAL
FILM ARRAY CORONAVIRUS NL63: NORMAL
FILM ARRAY CORONAVIRUS OC43: NORMAL
FILM ARRAY INFLUENZA A VIRUS 09H1: NORMAL
FILM ARRAY INFLUENZA A VIRUS H1: NORMAL
FILM ARRAY INFLUENZA A VIRUS H3: NORMAL
FILM ARRAY INFLUENZA A VIRUS: NORMAL
FILM ARRAY INFLUENZA B: NORMAL
FILM ARRAY METAPNEUMOVIRUS: NORMAL
FILM ARRAY MYCOPLASMA PNEUMONIAE: NORMAL
FILM ARRAY PARAINFLUENZA VIRUS 1: NORMAL
FILM ARRAY PARAINFLUENZA VIRUS 2: NORMAL
FILM ARRAY PARAINFLUENZA VIRUS 3: NORMAL
FILM ARRAY PARAINFLUENZA VIRUS 4: NORMAL
FILM ARRAY RESPIRATORY SYNCITIAL VIRUS: NORMAL
FILM ARRAY RHINOVIRUS/ENTEROVIRUS: NORMAL
GFR AFRICAN AMERICAN: >60
GFR NON-AFRICAN AMERICAN: >60 ML/MIN/1.73
GLUCOSE BLD-MCNC: 93 MG/DL (ref 74–99)
HDLC SERPL-MCNC: 30 MG/DL
LDL CHOLESTEROL CALCULATED: 63 MG/DL (ref 0–99)
LV EF: 63 %
LVEF MODALITY: NORMAL
MAGNESIUM: 1.9 MG/DL (ref 1.6–2.6)
POTASSIUM SERPL-SCNC: 4.1 MMOL/L (ref 3.5–5)
SODIUM BLD-SCNC: 144 MMOL/L (ref 132–146)
TRIGL SERPL-MCNC: 114 MG/DL (ref 0–149)
VLDLC SERPL CALC-MCNC: 23 MG/DL

## 2019-01-16 PROCEDURE — 87633 RESP VIRUS 12-25 TARGETS: CPT

## 2019-01-16 PROCEDURE — 83735 ASSAY OF MAGNESIUM: CPT

## 2019-01-16 PROCEDURE — 2580000003 HC RX 258: Performed by: FAMILY MEDICINE

## 2019-01-16 PROCEDURE — 93306 TTE W/DOPPLER COMPLETE: CPT

## 2019-01-16 PROCEDURE — 80048 BASIC METABOLIC PNL TOTAL CA: CPT

## 2019-01-16 PROCEDURE — 2700000000 HC OXYGEN THERAPY PER DAY

## 2019-01-16 PROCEDURE — 87798 DETECT AGENT NOS DNA AMP: CPT

## 2019-01-16 PROCEDURE — 6360000002 HC RX W HCPCS: Performed by: FAMILY MEDICINE

## 2019-01-16 PROCEDURE — 80061 LIPID PANEL: CPT

## 2019-01-16 PROCEDURE — 2060000000 HC ICU INTERMEDIATE R&B

## 2019-01-16 PROCEDURE — APPSS60 APP SPLIT SHARED TIME 46-60 MINUTES: Performed by: NURSE PRACTITIONER

## 2019-01-16 PROCEDURE — 87581 M.PNEUMON DNA AMP PROBE: CPT

## 2019-01-16 PROCEDURE — 6370000000 HC RX 637 (ALT 250 FOR IP): Performed by: FAMILY MEDICINE

## 2019-01-16 PROCEDURE — 36415 COLL VENOUS BLD VENIPUNCTURE: CPT

## 2019-01-16 PROCEDURE — 87486 CHLMYD PNEUM DNA AMP PROBE: CPT

## 2019-01-16 PROCEDURE — 99223 1ST HOSP IP/OBS HIGH 75: CPT | Performed by: INTERNAL MEDICINE

## 2019-01-16 RX ADMIN — ATORVASTATIN CALCIUM 10 MG: 10 TABLET, FILM COATED ORAL at 20:46

## 2019-01-16 RX ADMIN — RIVAROXABAN 20 MG: 20 TABLET, FILM COATED ORAL at 17:30

## 2019-01-16 RX ADMIN — CALCIUM 500 MG: 500 TABLET ORAL at 09:53

## 2019-01-16 RX ADMIN — FUROSEMIDE 40 MG: 10 INJECTION, SOLUTION INTRAMUSCULAR; INTRAVENOUS at 09:53

## 2019-01-16 RX ADMIN — Medication 10 ML: at 20:47

## 2019-01-16 RX ADMIN — LISINOPRIL 5 MG: 5 TABLET ORAL at 09:54

## 2019-01-16 RX ADMIN — Medication 10 ML: at 09:54

## 2019-01-16 RX ADMIN — ALPRAZOLAM 0.25 MG: 0.25 TABLET ORAL at 20:54

## 2019-01-16 RX ADMIN — FERROUS SULFATE TAB 325 MG (65 MG ELEMENTAL FE) 325 MG: 325 (65 FE) TAB at 09:54

## 2019-01-16 RX ADMIN — DOFETILIDE 250 MCG: 0.25 CAPSULE ORAL at 09:53

## 2019-01-16 RX ADMIN — PAROXETINE HYDROCHLORIDE 20 MG: 20 TABLET, FILM COATED ORAL at 09:53

## 2019-01-16 RX ADMIN — METOPROLOL SUCCINATE 12.5 MG: 25 TABLET, EXTENDED RELEASE ORAL at 09:53

## 2019-01-16 RX ADMIN — FUROSEMIDE 40 MG: 10 INJECTION, SOLUTION INTRAMUSCULAR; INTRAVENOUS at 20:47

## 2019-01-16 RX ADMIN — FAMOTIDINE 20 MG: 20 TABLET ORAL at 09:53

## 2019-01-16 RX ADMIN — DOFETILIDE 250 MCG: 0.25 CAPSULE ORAL at 20:46

## 2019-01-16 RX ADMIN — FERROUS SULFATE TAB 325 MG (65 MG ELEMENTAL FE) 325 MG: 325 (65 FE) TAB at 20:46

## 2019-01-16 RX ADMIN — FAMOTIDINE 20 MG: 20 TABLET ORAL at 20:46

## 2019-01-16 ASSESSMENT — PAIN SCALES - GENERAL
PAINLEVEL_OUTOF10: 0
PAINLEVEL_OUTOF10: 0

## 2019-01-17 LAB
ANION GAP SERPL CALCULATED.3IONS-SCNC: 10 MMOL/L (ref 7–16)
BUN BLDV-MCNC: 24 MG/DL (ref 8–23)
CALCIUM SERPL-MCNC: 8.5 MG/DL (ref 8.6–10.2)
CHLORIDE BLD-SCNC: 95 MMOL/L (ref 98–107)
CO2: 34 MMOL/L (ref 22–29)
CREAT SERPL-MCNC: 0.9 MG/DL (ref 0.5–1)
GFR AFRICAN AMERICAN: >60
GFR NON-AFRICAN AMERICAN: >60 ML/MIN/1.73
GLUCOSE BLD-MCNC: 108 MG/DL (ref 74–99)
MAGNESIUM: 1.7 MG/DL (ref 1.6–2.6)
POTASSIUM SERPL-SCNC: 3.2 MMOL/L (ref 3.5–5)
PRO-BNP: 1890 PG/ML (ref 0–125)
SODIUM BLD-SCNC: 139 MMOL/L (ref 132–146)

## 2019-01-17 PROCEDURE — 99232 SBSQ HOSP IP/OBS MODERATE 35: CPT | Performed by: INTERNAL MEDICINE

## 2019-01-17 PROCEDURE — 80048 BASIC METABOLIC PNL TOTAL CA: CPT

## 2019-01-17 PROCEDURE — 83735 ASSAY OF MAGNESIUM: CPT

## 2019-01-17 PROCEDURE — 6370000000 HC RX 637 (ALT 250 FOR IP): Performed by: FAMILY MEDICINE

## 2019-01-17 PROCEDURE — 83880 ASSAY OF NATRIURETIC PEPTIDE: CPT

## 2019-01-17 PROCEDURE — 2580000003 HC RX 258: Performed by: FAMILY MEDICINE

## 2019-01-17 PROCEDURE — 99999 PR OFFICE/OUTPT VISIT,PROCEDURE ONLY: CPT | Performed by: INTERNAL MEDICINE

## 2019-01-17 PROCEDURE — 2060000000 HC ICU INTERMEDIATE R&B

## 2019-01-17 PROCEDURE — 97161 PT EVAL LOW COMPLEX 20 MIN: CPT

## 2019-01-17 PROCEDURE — 97165 OT EVAL LOW COMPLEX 30 MIN: CPT

## 2019-01-17 PROCEDURE — 6360000002 HC RX W HCPCS: Performed by: FAMILY MEDICINE

## 2019-01-17 PROCEDURE — 36415 COLL VENOUS BLD VENIPUNCTURE: CPT

## 2019-01-17 PROCEDURE — 97535 SELF CARE MNGMENT TRAINING: CPT

## 2019-01-17 RX ORDER — ACETAMINOPHEN 325 MG/1
650 TABLET ORAL EVERY 4 HOURS PRN
Status: DISCONTINUED | OUTPATIENT
Start: 2019-01-17 | End: 2019-01-18 | Stop reason: HOSPADM

## 2019-01-17 RX ORDER — BUMETANIDE 1 MG/1
1 TABLET ORAL DAILY
Status: DISCONTINUED | OUTPATIENT
Start: 2019-01-18 | End: 2019-01-18

## 2019-01-17 RX ORDER — POTASSIUM CHLORIDE 20 MEQ/1
20 TABLET, EXTENDED RELEASE ORAL 2 TIMES DAILY WITH MEALS
Status: DISCONTINUED | OUTPATIENT
Start: 2019-01-17 | End: 2019-01-18 | Stop reason: HOSPADM

## 2019-01-17 RX ADMIN — ALPRAZOLAM 0.25 MG: 0.25 TABLET ORAL at 20:37

## 2019-01-17 RX ADMIN — Medication 10 ML: at 20:37

## 2019-01-17 RX ADMIN — FERROUS SULFATE TAB 325 MG (65 MG ELEMENTAL FE) 325 MG: 325 (65 FE) TAB at 08:39

## 2019-01-17 RX ADMIN — RIVAROXABAN 20 MG: 20 TABLET, FILM COATED ORAL at 17:15

## 2019-01-17 RX ADMIN — ACETAMINOPHEN 650 MG: 325 TABLET ORAL at 20:37

## 2019-01-17 RX ADMIN — Medication 10 ML: at 08:39

## 2019-01-17 RX ADMIN — FAMOTIDINE 20 MG: 20 TABLET ORAL at 20:37

## 2019-01-17 RX ADMIN — LISINOPRIL 5 MG: 5 TABLET ORAL at 08:39

## 2019-01-17 RX ADMIN — POTASSIUM CHLORIDE 20 MEQ: 20 TABLET, EXTENDED RELEASE ORAL at 08:40

## 2019-01-17 RX ADMIN — DOFETILIDE 250 MCG: 0.25 CAPSULE ORAL at 08:39

## 2019-01-17 RX ADMIN — POTASSIUM CHLORIDE 20 MEQ: 20 TABLET, EXTENDED RELEASE ORAL at 17:15

## 2019-01-17 RX ADMIN — ACETAMINOPHEN 650 MG: 325 TABLET ORAL at 14:38

## 2019-01-17 RX ADMIN — DOFETILIDE 250 MCG: 0.25 CAPSULE ORAL at 20:37

## 2019-01-17 RX ADMIN — PAROXETINE HYDROCHLORIDE 20 MG: 20 TABLET, FILM COATED ORAL at 08:40

## 2019-01-17 RX ADMIN — CALCIUM 500 MG: 500 TABLET ORAL at 08:39

## 2019-01-17 RX ADMIN — FERROUS SULFATE TAB 325 MG (65 MG ELEMENTAL FE) 325 MG: 325 (65 FE) TAB at 20:37

## 2019-01-17 RX ADMIN — ATORVASTATIN CALCIUM 10 MG: 10 TABLET, FILM COATED ORAL at 20:37

## 2019-01-17 RX ADMIN — FUROSEMIDE 40 MG: 10 INJECTION, SOLUTION INTRAMUSCULAR; INTRAVENOUS at 08:39

## 2019-01-17 RX ADMIN — METOPROLOL SUCCINATE 12.5 MG: 25 TABLET, EXTENDED RELEASE ORAL at 08:39

## 2019-01-17 RX ADMIN — FAMOTIDINE 20 MG: 20 TABLET ORAL at 08:40

## 2019-01-17 ASSESSMENT — PAIN DESCRIPTION - LOCATION
LOCATION: BACK
LOCATION: BACK

## 2019-01-17 ASSESSMENT — PAIN SCALES - GENERAL
PAINLEVEL_OUTOF10: 8
PAINLEVEL_OUTOF10: 0
PAINLEVEL_OUTOF10: 3
PAINLEVEL_OUTOF10: 0
PAINLEVEL_OUTOF10: 0

## 2019-01-17 ASSESSMENT — PAIN DESCRIPTION - PAIN TYPE
TYPE: ACUTE PAIN
TYPE: ACUTE PAIN

## 2019-01-17 ASSESSMENT — PAIN DESCRIPTION - DESCRIPTORS
DESCRIPTORS: ACHING;DISCOMFORT
DESCRIPTORS: ACHING;DISCOMFORT

## 2019-01-17 ASSESSMENT — PAIN DESCRIPTION - ORIENTATION
ORIENTATION: LOWER
ORIENTATION: LOWER

## 2019-01-17 ASSESSMENT — PAIN - FUNCTIONAL ASSESSMENT: PAIN_FUNCTIONAL_ASSESSMENT: ACTIVITIES ARE NOT PREVENTED

## 2019-01-18 VITALS
HEIGHT: 66 IN | HEART RATE: 66 BPM | WEIGHT: 170.6 LBS | DIASTOLIC BLOOD PRESSURE: 58 MMHG | OXYGEN SATURATION: 92 % | RESPIRATION RATE: 16 BRPM | BODY MASS INDEX: 27.42 KG/M2 | SYSTOLIC BLOOD PRESSURE: 113 MMHG | TEMPERATURE: 97.6 F

## 2019-01-18 LAB
ANION GAP SERPL CALCULATED.3IONS-SCNC: 7 MMOL/L (ref 7–16)
BUN BLDV-MCNC: 25 MG/DL (ref 8–23)
CALCIUM SERPL-MCNC: 8.4 MG/DL (ref 8.6–10.2)
CHLORIDE BLD-SCNC: 94 MMOL/L (ref 98–107)
CO2: 37 MMOL/L (ref 22–29)
CREAT SERPL-MCNC: 1.1 MG/DL (ref 0.5–1)
GFR AFRICAN AMERICAN: 59
GFR NON-AFRICAN AMERICAN: 49 ML/MIN/1.73
GLUCOSE BLD-MCNC: 100 MG/DL (ref 74–99)
MAGNESIUM: 2 MG/DL (ref 1.6–2.6)
POTASSIUM SERPL-SCNC: 3.8 MMOL/L (ref 3.5–5)
SODIUM BLD-SCNC: 138 MMOL/L (ref 132–146)

## 2019-01-18 PROCEDURE — 2700000000 HC OXYGEN THERAPY PER DAY

## 2019-01-18 PROCEDURE — 99231 SBSQ HOSP IP/OBS SF/LOW 25: CPT | Performed by: INTERNAL MEDICINE

## 2019-01-18 PROCEDURE — 36415 COLL VENOUS BLD VENIPUNCTURE: CPT

## 2019-01-18 PROCEDURE — 83735 ASSAY OF MAGNESIUM: CPT

## 2019-01-18 PROCEDURE — 80048 BASIC METABOLIC PNL TOTAL CA: CPT

## 2019-01-18 PROCEDURE — 97530 THERAPEUTIC ACTIVITIES: CPT

## 2019-01-18 PROCEDURE — 2580000003 HC RX 258: Performed by: FAMILY MEDICINE

## 2019-01-18 PROCEDURE — 6370000000 HC RX 637 (ALT 250 FOR IP): Performed by: FAMILY MEDICINE

## 2019-01-18 RX ORDER — BUMETANIDE 1 MG/1
1 TABLET ORAL DAILY
Status: DISCONTINUED | OUTPATIENT
Start: 2019-01-18 | End: 2019-01-18 | Stop reason: HOSPADM

## 2019-01-18 RX ORDER — BUMETANIDE 1 MG/1
1 TABLET ORAL DAILY
Qty: 30 TABLET | Refills: 3 | Status: SHIPPED | OUTPATIENT
Start: 2019-01-18 | End: 2019-02-22 | Stop reason: SDUPTHER

## 2019-01-18 RX ORDER — LISINOPRIL 5 MG/1
5 TABLET ORAL DAILY
Qty: 30 TABLET | Refills: 3 | Status: ON HOLD | OUTPATIENT
Start: 2019-01-19 | End: 2021-03-03 | Stop reason: HOSPADM

## 2019-01-18 RX ADMIN — CALCIUM 500 MG: 500 TABLET ORAL at 08:59

## 2019-01-18 RX ADMIN — BUMETANIDE 1 MG: 1 TABLET ORAL at 16:41

## 2019-01-18 RX ADMIN — POTASSIUM CHLORIDE 20 MEQ: 20 TABLET, EXTENDED RELEASE ORAL at 09:00

## 2019-01-18 RX ADMIN — RIVAROXABAN 20 MG: 20 TABLET, FILM COATED ORAL at 16:41

## 2019-01-18 RX ADMIN — LISINOPRIL 5 MG: 5 TABLET ORAL at 08:59

## 2019-01-18 RX ADMIN — METOPROLOL SUCCINATE 12.5 MG: 25 TABLET, EXTENDED RELEASE ORAL at 09:00

## 2019-01-18 RX ADMIN — POTASSIUM CHLORIDE 20 MEQ: 20 TABLET, EXTENDED RELEASE ORAL at 16:41

## 2019-01-18 RX ADMIN — DOFETILIDE 250 MCG: 0.25 CAPSULE ORAL at 09:00

## 2019-01-18 RX ADMIN — Medication 10 ML: at 09:02

## 2019-01-18 RX ADMIN — FAMOTIDINE 20 MG: 20 TABLET ORAL at 08:59

## 2019-01-18 RX ADMIN — FERROUS SULFATE TAB 325 MG (65 MG ELEMENTAL FE) 325 MG: 325 (65 FE) TAB at 09:00

## 2019-01-18 RX ADMIN — PAROXETINE HYDROCHLORIDE 20 MG: 20 TABLET, FILM COATED ORAL at 09:00

## 2019-01-18 ASSESSMENT — PAIN SCALES - GENERAL
PAINLEVEL_OUTOF10: 0
PAINLEVEL_OUTOF10: 0

## 2019-01-25 ENCOUNTER — OFFICE VISIT (OUTPATIENT)
Dept: CARDIOLOGY CLINIC | Age: 74
End: 2019-01-25
Payer: COMMERCIAL

## 2019-01-25 VITALS
HEART RATE: 53 BPM | HEIGHT: 66 IN | WEIGHT: 164.7 LBS | RESPIRATION RATE: 18 BRPM | SYSTOLIC BLOOD PRESSURE: 106 MMHG | BODY MASS INDEX: 26.47 KG/M2 | DIASTOLIC BLOOD PRESSURE: 66 MMHG

## 2019-01-25 DIAGNOSIS — I50.30 (HFPEF) HEART FAILURE WITH PRESERVED EJECTION FRACTION (HCC): Primary | ICD-10-CM

## 2019-01-25 DIAGNOSIS — Z79.01 CHRONIC ANTICOAGULATION: ICD-10-CM

## 2019-01-25 DIAGNOSIS — I48.19 PERSISTENT ATRIAL FIBRILLATION (HCC): ICD-10-CM

## 2019-01-25 DIAGNOSIS — M79.89 LEG SWELLING: ICD-10-CM

## 2019-01-25 DIAGNOSIS — R06.09 DOE (DYSPNEA ON EXERTION): ICD-10-CM

## 2019-01-25 PROCEDURE — 99214 OFFICE O/P EST MOD 30 MIN: CPT | Performed by: PHYSICIAN ASSISTANT

## 2019-01-25 PROCEDURE — 93000 ELECTROCARDIOGRAM COMPLETE: CPT | Performed by: PHYSICIAN ASSISTANT

## 2019-01-27 LAB
EKG ATRIAL RATE: 136 BPM
EKG Q-T INTERVAL: 268 MS
EKG QRS DURATION: 72 MS
EKG QTC CALCULATION (BAZETT): 403 MS
EKG R AXIS: -122 DEGREES
EKG T AXIS: -153 DEGREES
EKG VENTRICULAR RATE: 136 BPM

## 2019-01-30 ENCOUNTER — HOSPITAL ENCOUNTER (OUTPATIENT)
Dept: INFUSION THERAPY | Age: 74
Discharge: HOME | End: 2019-01-30
Payer: COMMERCIAL

## 2019-01-30 ENCOUNTER — OFFICE VISIT (OUTPATIENT)
Dept: ONCOLOGY | Age: 74
End: 2019-01-30
Payer: COMMERCIAL

## 2019-01-30 VITALS
HEIGHT: 66 IN | DIASTOLIC BLOOD PRESSURE: 66 MMHG | SYSTOLIC BLOOD PRESSURE: 118 MMHG | OXYGEN SATURATION: 97 % | HEART RATE: 61 BPM | WEIGHT: 168.5 LBS | BODY MASS INDEX: 27.08 KG/M2 | TEMPERATURE: 97.6 F | RESPIRATION RATE: 20 BRPM

## 2019-01-30 DIAGNOSIS — C77.3 BREAST CANCER METASTASIZED TO AXILLARY LYMPH NODE, RIGHT (HCC): Primary | ICD-10-CM

## 2019-01-30 DIAGNOSIS — C77.3 CARCINOMA OF RIGHT BREAST METASTATIC TO AXILLARY LYMPH NODE (HCC): ICD-10-CM

## 2019-01-30 DIAGNOSIS — C50.911 CARCINOMA OF RIGHT BREAST METASTATIC TO AXILLARY LYMPH NODE (HCC): ICD-10-CM

## 2019-01-30 DIAGNOSIS — C50.911 BREAST CANCER METASTASIZED TO AXILLARY LYMPH NODE, RIGHT (HCC): ICD-10-CM

## 2019-01-30 DIAGNOSIS — C77.3 BREAST CANCER METASTASIZED TO AXILLARY LYMPH NODE, RIGHT (HCC): ICD-10-CM

## 2019-01-30 DIAGNOSIS — D50.8 OTHER IRON DEFICIENCY ANEMIA: ICD-10-CM

## 2019-01-30 DIAGNOSIS — C50.911 BREAST CANCER METASTASIZED TO AXILLARY LYMPH NODE, RIGHT (HCC): Primary | ICD-10-CM

## 2019-01-30 LAB
ALBUMIN SERPL-MCNC: 3.5 G/DL (ref 3.5–5.2)
ALP BLD-CCNC: 72 U/L (ref 35–104)
ALT SERPL-CCNC: 10 U/L (ref 0–32)
ANION GAP SERPL CALCULATED.3IONS-SCNC: 7 MMOL/L (ref 7–16)
AST SERPL-CCNC: 22 U/L (ref 0–31)
BASOPHILS ABSOLUTE: 0.02 E9/L (ref 0–0.2)
BASOPHILS RELATIVE PERCENT: 0.4 % (ref 0–2)
BILIRUB SERPL-MCNC: 0.6 MG/DL (ref 0–1.2)
BUN BLDV-MCNC: 23 MG/DL (ref 8–23)
CALCIUM SERPL-MCNC: 8.9 MG/DL (ref 8.6–10.2)
CHLORIDE BLD-SCNC: 99 MMOL/L (ref 98–107)
CO2: 32 MMOL/L (ref 22–29)
CREAT SERPL-MCNC: 0.7 MG/DL (ref 0.5–1)
EOSINOPHILS ABSOLUTE: 0.15 E9/L (ref 0.05–0.5)
EOSINOPHILS RELATIVE PERCENT: 2.7 % (ref 0–6)
FERRITIN: 135 NG/ML
GFR AFRICAN AMERICAN: >60
GFR NON-AFRICAN AMERICAN: >60 ML/MIN/1.73
GLUCOSE BLD-MCNC: 96 MG/DL (ref 74–99)
HCT VFR BLD CALC: 43.7 % (ref 34–48)
HEMOGLOBIN: 13.1 G/DL (ref 11.5–15.5)
IMMATURE GRANULOCYTES #: 0.01 E9/L
IMMATURE GRANULOCYTES %: 0.2 % (ref 0–5)
IRON SATURATION: 27 % (ref 15–50)
IRON: 63 MCG/DL (ref 37–145)
LYMPHOCYTES ABSOLUTE: 1.88 E9/L (ref 1.5–4)
LYMPHOCYTES RELATIVE PERCENT: 33.7 % (ref 20–42)
MCH RBC QN AUTO: 28.3 PG (ref 26–35)
MCHC RBC AUTO-ENTMCNC: 30 % (ref 32–34.5)
MCV RBC AUTO: 94.4 FL (ref 80–99.9)
MONOCYTES ABSOLUTE: 0.72 E9/L (ref 0.1–0.95)
MONOCYTES RELATIVE PERCENT: 12.9 % (ref 2–12)
NEUTROPHILS ABSOLUTE: 2.8 E9/L (ref 1.8–7.3)
NEUTROPHILS RELATIVE PERCENT: 50.1 % (ref 43–80)
PDW BLD-RTO: 17.1 FL (ref 11.5–15)
PLATELET # BLD: 177 E9/L (ref 130–450)
PMV BLD AUTO: 10.2 FL (ref 7–12)
POTASSIUM SERPL-SCNC: 4.3 MMOL/L (ref 3.5–5)
PRO-BNP: 2291 PG/ML (ref 0–125)
RBC # BLD: 4.63 E12/L (ref 3.5–5.5)
SODIUM BLD-SCNC: 138 MMOL/L (ref 132–146)
TOTAL IRON BINDING CAPACITY: 235 MCG/DL (ref 250–450)
TOTAL PROTEIN: 6.6 G/DL (ref 6.4–8.3)
WBC # BLD: 5.6 E9/L (ref 4.5–11.5)

## 2019-01-30 PROCEDURE — 99214 OFFICE O/P EST MOD 30 MIN: CPT | Performed by: INTERNAL MEDICINE

## 2019-01-30 PROCEDURE — 83540 ASSAY OF IRON: CPT

## 2019-01-30 PROCEDURE — 83550 IRON BINDING TEST: CPT

## 2019-01-30 PROCEDURE — 6360000002 HC RX W HCPCS: Performed by: INTERNAL MEDICINE

## 2019-01-30 PROCEDURE — 80053 COMPREHEN METABOLIC PANEL: CPT

## 2019-01-30 PROCEDURE — 2580000003 HC RX 258: Performed by: INTERNAL MEDICINE

## 2019-01-30 PROCEDURE — 82728 ASSAY OF FERRITIN: CPT

## 2019-01-30 PROCEDURE — 85025 COMPLETE CBC W/AUTO DIFF WBC: CPT

## 2019-01-30 PROCEDURE — 83880 ASSAY OF NATRIURETIC PEPTIDE: CPT

## 2019-01-30 RX ORDER — HEPARIN SODIUM (PORCINE) LOCK FLUSH IV SOLN 100 UNIT/ML 100 UNIT/ML
500 SOLUTION INTRAVENOUS PRN
Status: DISCONTINUED | OUTPATIENT
Start: 2019-01-30 | End: 2019-01-31 | Stop reason: HOSPADM

## 2019-01-30 RX ORDER — SODIUM CHLORIDE 0.9 % (FLUSH) 0.9 %
10 SYRINGE (ML) INJECTION PRN
Status: DISCONTINUED | OUTPATIENT
Start: 2019-01-30 | End: 2019-01-31 | Stop reason: HOSPADM

## 2019-01-30 RX ORDER — HEPARIN SODIUM (PORCINE) LOCK FLUSH IV SOLN 100 UNIT/ML 100 UNIT/ML
500 SOLUTION INTRAVENOUS PRN
Status: CANCELLED | OUTPATIENT
Start: 2019-01-30

## 2019-01-30 RX ORDER — SODIUM CHLORIDE 0.9 % (FLUSH) 0.9 %
10 SYRINGE (ML) INJECTION PRN
Status: CANCELLED | OUTPATIENT
Start: 2019-01-30

## 2019-01-30 RX ADMIN — Medication 10 ML: at 11:48

## 2019-01-30 RX ADMIN — Medication 500 UNITS: at 11:48

## 2019-01-30 NOTE — PROGRESS NOTES
PORT FLUSH / LAB DRAW    Patient presents to clinic for [facility] today. 20  SQ port accessed per policy using 9.06 Irby needle for good blood return. Aspirate for waste and specimen sent to lab. Site flushed easily with 10 mL NSS followed by 5 mL Heparin solution 100 units/ml rinse prior to de-access. Dry sterile dressing to area. Tolerated procedure well. Encouraged to schedule port flush every 4 weeks.

## 2019-02-22 ENCOUNTER — OFFICE VISIT (OUTPATIENT)
Dept: CARDIOLOGY CLINIC | Age: 74
End: 2019-02-22
Payer: COMMERCIAL

## 2019-02-22 VITALS
BODY MASS INDEX: 26.55 KG/M2 | SYSTOLIC BLOOD PRESSURE: 122 MMHG | HEIGHT: 66 IN | WEIGHT: 165.2 LBS | HEART RATE: 60 BPM | DIASTOLIC BLOOD PRESSURE: 74 MMHG | RESPIRATION RATE: 16 BRPM

## 2019-02-22 DIAGNOSIS — C50.911 CARCINOMA OF RIGHT BREAST METASTATIC TO AXILLARY LYMPH NODE (HCC): ICD-10-CM

## 2019-02-22 DIAGNOSIS — C77.3 CARCINOMA OF RIGHT BREAST METASTATIC TO AXILLARY LYMPH NODE (HCC): ICD-10-CM

## 2019-02-22 DIAGNOSIS — I50.30 (HFPEF) HEART FAILURE WITH PRESERVED EJECTION FRACTION (HCC): ICD-10-CM

## 2019-02-22 DIAGNOSIS — D50.8 OTHER IRON DEFICIENCY ANEMIA: ICD-10-CM

## 2019-02-22 DIAGNOSIS — Z79.01 CHRONIC ANTICOAGULATION: ICD-10-CM

## 2019-02-22 DIAGNOSIS — I48.19 PERSISTENT ATRIAL FIBRILLATION (HCC): Primary | ICD-10-CM

## 2019-02-22 PROCEDURE — 99213 OFFICE O/P EST LOW 20 MIN: CPT | Performed by: PHYSICIAN ASSISTANT

## 2019-02-22 PROCEDURE — 93000 ELECTROCARDIOGRAM COMPLETE: CPT | Performed by: INTERNAL MEDICINE

## 2019-02-22 RX ORDER — BUMETANIDE 1 MG/1
1 TABLET ORAL DAILY
Qty: 90 TABLET | Refills: 3 | Status: ON HOLD | OUTPATIENT
Start: 2019-02-22 | End: 2021-03-03 | Stop reason: HOSPADM

## 2019-03-08 ENCOUNTER — OFFICE VISIT (OUTPATIENT)
Dept: NON INVASIVE DIAGNOSTICS | Age: 74
End: 2019-03-08
Payer: COMMERCIAL

## 2019-03-08 VITALS
SYSTOLIC BLOOD PRESSURE: 108 MMHG | RESPIRATION RATE: 16 BRPM | DIASTOLIC BLOOD PRESSURE: 70 MMHG | HEIGHT: 66 IN | BODY MASS INDEX: 27 KG/M2 | WEIGHT: 168 LBS | HEART RATE: 55 BPM

## 2019-03-08 DIAGNOSIS — I48.0 PAF (PAROXYSMAL ATRIAL FIBRILLATION) (HCC): Primary | ICD-10-CM

## 2019-03-08 DIAGNOSIS — Z79.899 LONG TERM CURRENT USE OF ANTIARRHYTHMIC DRUG: ICD-10-CM

## 2019-03-08 DIAGNOSIS — I48.91 ATRIAL FIBRILLATION, UNSPECIFIED TYPE (HCC): ICD-10-CM

## 2019-03-08 PROCEDURE — 93000 ELECTROCARDIOGRAM COMPLETE: CPT | Performed by: NURSE PRACTITIONER

## 2019-03-08 PROCEDURE — 99214 OFFICE O/P EST MOD 30 MIN: CPT | Performed by: NURSE PRACTITIONER

## 2019-03-08 ASSESSMENT — ENCOUNTER SYMPTOMS
CHEST TIGHTNESS: 0
SHORTNESS OF BREATH: 0
COUGH: 0

## 2019-05-28 ENCOUNTER — TELEPHONE (OUTPATIENT)
Dept: CARDIOLOGY CLINIC | Age: 74
End: 2019-05-28

## 2019-05-30 ENCOUNTER — APPOINTMENT (OUTPATIENT)
Dept: CT IMAGING | Age: 74
End: 2019-05-30
Payer: COMMERCIAL

## 2019-05-30 ENCOUNTER — HOSPITAL ENCOUNTER (OUTPATIENT)
Age: 74
Setting detail: OBSERVATION
Discharge: HOME | End: 2019-05-31
Attending: EMERGENCY MEDICINE | Admitting: FAMILY MEDICINE
Payer: COMMERCIAL

## 2019-05-30 ENCOUNTER — APPOINTMENT (OUTPATIENT)
Dept: GENERAL RADIOLOGY | Age: 74
End: 2019-05-30
Payer: COMMERCIAL

## 2019-05-30 DIAGNOSIS — R55 SYNCOPE AND COLLAPSE: Primary | ICD-10-CM

## 2019-05-30 LAB
ALBUMIN SERPL-MCNC: 3.8 G/DL (ref 3.5–5.2)
ALP BLD-CCNC: 78 U/L (ref 35–104)
ALT SERPL-CCNC: 17 U/L (ref 0–32)
ANION GAP SERPL CALCULATED.3IONS-SCNC: 10 MMOL/L (ref 7–16)
AST SERPL-CCNC: 31 U/L (ref 0–31)
BASOPHILS ABSOLUTE: 0.02 E9/L (ref 0–0.2)
BASOPHILS RELATIVE PERCENT: 0.2 % (ref 0–2)
BILIRUB SERPL-MCNC: 1.1 MG/DL (ref 0–1.2)
BUN BLDV-MCNC: 21 MG/DL (ref 8–23)
CALCIUM SERPL-MCNC: 8.9 MG/DL (ref 8.6–10.2)
CHLORIDE BLD-SCNC: 95 MMOL/L (ref 98–107)
CO2: 34 MMOL/L (ref 22–29)
CREAT SERPL-MCNC: 0.8 MG/DL (ref 0.5–1)
EKG ATRIAL RATE: 208 BPM
EKG Q-T INTERVAL: 632 MS
EKG QRS DURATION: 88 MS
EKG QTC CALCULATION (BAZETT): 701 MS
EKG R AXIS: -83 DEGREES
EKG T AXIS: 13 DEGREES
EKG VENTRICULAR RATE: 74 BPM
EOSINOPHILS ABSOLUTE: 0.04 E9/L (ref 0.05–0.5)
EOSINOPHILS RELATIVE PERCENT: 0.4 % (ref 0–6)
GFR AFRICAN AMERICAN: >60
GFR NON-AFRICAN AMERICAN: >60 ML/MIN/1.73
GLUCOSE BLD-MCNC: 121 MG/DL (ref 74–99)
HCT VFR BLD CALC: 40.7 % (ref 34–48)
HEMOGLOBIN: 12.9 G/DL (ref 11.5–15.5)
IMMATURE GRANULOCYTES #: 0.04 E9/L
IMMATURE GRANULOCYTES %: 0.4 % (ref 0–5)
LIPASE: 27 U/L (ref 13–60)
LYMPHOCYTES ABSOLUTE: 1.25 E9/L (ref 1.5–4)
LYMPHOCYTES RELATIVE PERCENT: 13.7 % (ref 20–42)
MAGNESIUM: 2.1 MG/DL (ref 1.6–2.6)
MCH RBC QN AUTO: 31.3 PG (ref 26–35)
MCHC RBC AUTO-ENTMCNC: 31.7 % (ref 32–34.5)
MCV RBC AUTO: 98.8 FL (ref 80–99.9)
MONOCYTES ABSOLUTE: 0.65 E9/L (ref 0.1–0.95)
MONOCYTES RELATIVE PERCENT: 7.1 % (ref 2–12)
NEUTROPHILS ABSOLUTE: 7.12 E9/L (ref 1.8–7.3)
NEUTROPHILS RELATIVE PERCENT: 78.2 % (ref 43–80)
PDW BLD-RTO: 14.3 FL (ref 11.5–15)
PLATELET # BLD: 176 E9/L (ref 130–450)
PMV BLD AUTO: 10.7 FL (ref 7–12)
POTASSIUM SERPL-SCNC: 3.5 MMOL/L (ref 3.5–5)
RBC # BLD: 4.12 E12/L (ref 3.5–5.5)
REASON FOR REJECTION: NORMAL
REJECTED TEST: NORMAL
SODIUM BLD-SCNC: 139 MMOL/L (ref 132–146)
TOTAL PROTEIN: 6.9 G/DL (ref 6.4–8.3)
TROPONIN: 0.02 NG/ML (ref 0–0.03)
WBC # BLD: 9.1 E9/L (ref 4.5–11.5)

## 2019-05-30 PROCEDURE — 85025 COMPLETE CBC W/AUTO DIFF WBC: CPT

## 2019-05-30 PROCEDURE — 99285 EMERGENCY DEPT VISIT HI MDM: CPT

## 2019-05-30 PROCEDURE — 83690 ASSAY OF LIPASE: CPT

## 2019-05-30 PROCEDURE — G0378 HOSPITAL OBSERVATION PER HR: HCPCS

## 2019-05-30 PROCEDURE — 6370000000 HC RX 637 (ALT 250 FOR IP): Performed by: INTERNAL MEDICINE

## 2019-05-30 PROCEDURE — 2580000003 HC RX 258: Performed by: INTERNAL MEDICINE

## 2019-05-30 PROCEDURE — 93010 ELECTROCARDIOGRAM REPORT: CPT | Performed by: INTERNAL MEDICINE

## 2019-05-30 PROCEDURE — 71045 X-RAY EXAM CHEST 1 VIEW: CPT

## 2019-05-30 PROCEDURE — 83735 ASSAY OF MAGNESIUM: CPT

## 2019-05-30 PROCEDURE — 84484 ASSAY OF TROPONIN QUANT: CPT

## 2019-05-30 PROCEDURE — 80053 COMPREHEN METABOLIC PANEL: CPT

## 2019-05-30 PROCEDURE — 70450 CT HEAD/BRAIN W/O DYE: CPT

## 2019-05-30 PROCEDURE — 93005 ELECTROCARDIOGRAM TRACING: CPT | Performed by: EMERGENCY MEDICINE

## 2019-05-30 RX ORDER — SODIUM CHLORIDE 9 MG/ML
INJECTION, SOLUTION INTRAVENOUS CONTINUOUS
Status: DISCONTINUED | OUTPATIENT
Start: 2019-05-30 | End: 2019-05-31 | Stop reason: HOSPADM

## 2019-05-30 RX ORDER — SODIUM CHLORIDE 0.9 % (FLUSH) 0.9 %
10 SYRINGE (ML) INJECTION PRN
Status: DISCONTINUED | OUTPATIENT
Start: 2019-05-30 | End: 2019-05-31 | Stop reason: HOSPADM

## 2019-05-30 RX ORDER — FAMOTIDINE 20 MG/1
20 TABLET, FILM COATED ORAL 2 TIMES DAILY
Status: DISCONTINUED | OUTPATIENT
Start: 2019-05-30 | End: 2019-05-31 | Stop reason: HOSPADM

## 2019-05-30 RX ORDER — SODIUM CHLORIDE 0.9 % (FLUSH) 0.9 %
10 SYRINGE (ML) INJECTION EVERY 12 HOURS SCHEDULED
Status: DISCONTINUED | OUTPATIENT
Start: 2019-05-30 | End: 2019-05-31 | Stop reason: HOSPADM

## 2019-05-30 RX ORDER — FERROUS SULFATE 325(65) MG
325 TABLET ORAL 2 TIMES DAILY
Status: DISCONTINUED | OUTPATIENT
Start: 2019-05-30 | End: 2019-05-31 | Stop reason: HOSPADM

## 2019-05-30 RX ORDER — ATORVASTATIN CALCIUM 10 MG/1
10 TABLET, FILM COATED ORAL NIGHTLY
Status: DISCONTINUED | OUTPATIENT
Start: 2019-05-30 | End: 2019-05-31 | Stop reason: HOSPADM

## 2019-05-30 RX ORDER — CALCIUM CARBONATE 500(1250)
500 TABLET ORAL EVERY MORNING
Status: DISCONTINUED | OUTPATIENT
Start: 2019-05-31 | End: 2019-05-31 | Stop reason: HOSPADM

## 2019-05-30 RX ORDER — ALPRAZOLAM 0.25 MG/1
0.25 TABLET ORAL 3 TIMES DAILY PRN
Status: DISCONTINUED | OUTPATIENT
Start: 2019-05-30 | End: 2019-05-31 | Stop reason: HOSPADM

## 2019-05-30 RX ORDER — LISINOPRIL 5 MG/1
5 TABLET ORAL DAILY
Status: DISCONTINUED | OUTPATIENT
Start: 2019-05-31 | End: 2019-05-31 | Stop reason: HOSPADM

## 2019-05-30 RX ORDER — POTASSIUM CHLORIDE 20 MEQ/1
40 TABLET, EXTENDED RELEASE ORAL ONCE
Status: COMPLETED | OUTPATIENT
Start: 2019-05-30 | End: 2019-05-30

## 2019-05-30 RX ORDER — ACETAMINOPHEN 325 MG/1
650 TABLET ORAL EVERY 4 HOURS PRN
Status: DISCONTINUED | OUTPATIENT
Start: 2019-05-30 | End: 2019-05-31 | Stop reason: HOSPADM

## 2019-05-30 RX ORDER — PAROXETINE HYDROCHLORIDE 20 MG/1
20 TABLET, FILM COATED ORAL EVERY MORNING
Status: DISCONTINUED | OUTPATIENT
Start: 2019-05-31 | End: 2019-05-31 | Stop reason: HOSPADM

## 2019-05-30 RX ORDER — DOFETILIDE 0.25 MG/1
250 CAPSULE ORAL EVERY 12 HOURS SCHEDULED
Status: DISCONTINUED | OUTPATIENT
Start: 2019-05-30 | End: 2019-05-30

## 2019-05-30 RX ADMIN — FAMOTIDINE 20 MG: 20 TABLET ORAL at 22:55

## 2019-05-30 RX ADMIN — Medication 10 ML: at 22:56

## 2019-05-30 RX ADMIN — FERROUS SULFATE TAB 325 MG (65 MG ELEMENTAL FE) 325 MG: 325 (65 FE) TAB at 22:55

## 2019-05-30 RX ADMIN — POTASSIUM CHLORIDE 40 MEQ: 20 TABLET, EXTENDED RELEASE ORAL at 21:12

## 2019-05-30 RX ADMIN — ATORVASTATIN CALCIUM 10 MG: 10 TABLET, FILM COATED ORAL at 22:55

## 2019-05-30 RX ADMIN — POTASSIUM CHLORIDE 40 MEQ: 20 TABLET, EXTENDED RELEASE ORAL at 23:14

## 2019-05-30 RX ADMIN — SODIUM CHLORIDE: 9 INJECTION, SOLUTION INTRAVENOUS at 23:07

## 2019-05-30 RX ADMIN — ALPRAZOLAM 0.25 MG: 0.25 TABLET ORAL at 22:55

## 2019-05-30 ASSESSMENT — PAIN SCALES - GENERAL: PAINLEVEL_OUTOF10: 0

## 2019-05-30 NOTE — ED PROVIDER NOTES
Department of Emergency Medicine   ED  Provider Note  Admit Date/RoomTime: 5/30/2019  2:17 PM  ED Room: 12/12          History of Present Illness:  5/30/19, Time: 4:13 PM         Nyasia Hardin is a 72 y.o. female presenting to the ED for syncope, beginning prior to arrival.  The complaint has been persistent, moderate in severity, and worsened by nothing. Pt states that she passed out. Report that she started to have nausea and lightheadedness 1 day ago. Described it as if she would pass out. Reports that today, while shopping, she started to have the same lightheadedness then passed out. Pt is not sure how long she was out but people around her called EMS. States that she thinks she hit her head during her fall. Pt reports that she did not eat much since yesterday. Pt has not passed out in the past. Pt also reports that she has had shortness of breath for the past week. Has a hx of CHF and is on Bumex. Also states that she wear oxygen at night prescribed by her PCP. States that she is also on Xarelto for atrial fibrillation. Has noticed a mild headache which she contributed to sinuses. Had normal BM this morning and takes iron. Denies changes in her medications. Denies chest pain or pain prior to passing out, abdominal pain, emesis, diarrhea, constipation, and further complaints. Review of Systems:   Pertinent positives and negatives are stated within HPI, all other systems reviewed and are negative.      --------------------------------------------- PAST HISTORY ---------------------------------------------  Past Medical History:  has a past medical history of AF (atrial fibrillation) ([facility] Anxiety, Breast cancer ([facility] Depression, Lymphadenopathy, Osteoporosis, Radiation, and Status post chemotherapy. Past Surgical History:  has a past surgical history that includes Tunneled venous port placement; Tonsillectomy; bronchoscopy (7/12/12); Mastectomy; Cardiac catheterization (Right, 4-3-2014);  Cardioversion (11/16/2016); and Mastectomy (Right, 02/06/2009). Social History:  reports that she has never smoked. She has never used smokeless tobacco. She reports that she does not drink alcohol or use drugs. Family History: family history includes Breast Cancer in her maternal aunt; Cancer in her father and mother. The patients home medications have been reviewed. Allergies: Sulfa antibiotics      ---------------------------------------------------PHYSICAL EXAM--------------------------------------    Constitutional/General: Alert and oriented x3  Head: Normocephalic, mild hematoma to the left periorbital area. Eyes: PERRL, EOMI  Mouth: Oropharynx clear, handling secretions  Neck: Supple, full ROM  Respiratory: Lungs clear to auscultation bilaterally, no wheezes, rales, or rhonchi. Not in respiratory distress  Cardiovascular:  Regular rate. Irregularly irregular rhythm. No murmurs, gallops, or rubs. 2+ distal pulses  Chest: No chest wall tenderness  GI:  Abdomen Soft, Non tender, Non distended. +BS. No rebound, guarding, or rigidity. No pulsatile masses. Musculoskeletal: Moves all extremities x 4. Warm and well perfused, no edema. Integument: skin warm and dry. No rashes. Neurologic: GCS 15, no focal deficits, symmetric strength 5/5 in the upper and lower extremities bilaterally  Psychiatric: Normal Affect      -------------------------------------------------- RESULTS -------------------------------------------------  I have personally reviewed all laboratory and imaging results for this patient. Results are listed below.      LABS:  Results for orders placed or performed during the hospital encounter of 05/30/19   CBC Auto Differential   Result Value Ref Range    WBC 9.1 4.5 - 11.5 E9/L    RBC 4.12 3.50 - 5.50 E12/L    Hemoglobin 12.9 11.5 - 15.5 g/dL    Hematocrit 40.7 34.0 - 48.0 %    MCV 98.8 80.0 - 99.9 fL    MCH 31.3 26.0 - 35.0 pg    MCHC 31.7 (L) 32.0 - 34.5 %    RDW 14.3 11.5 - 15.0 fL Platelets 156 488 - 097 E9/L    MPV 10.7 7.0 - 12.0 fL    Neutrophils % 78.2 43.0 - 80.0 %    Immature Granulocytes % 0.4 0.0 - 5.0 %    Lymphocytes % 13.7 (L) 20.0 - 42.0 %    Monocytes % 7.1 2.0 - 12.0 %    Eosinophils % 0.4 0.0 - 6.0 %    Basophils % 0.2 0.0 - 2.0 %    Neutrophils # 7.12 1.80 - 7.30 E9/L    Immature Granulocytes # 0.04 E9/L    Lymphocytes # 1.25 (L) 1.50 - 4.00 E9/L    Monocytes # 0.65 0.10 - 0.95 E9/L    Eosinophils # 0.04 (L) 0.05 - 0.50 E9/L    Basophils # 0.02 0.00 - 0.20 E9/L   Comprehensive Metabolic Panel   Result Value Ref Range    Sodium 139 132 - 146 mmol/L    Potassium 3.5 3.5 - 5.0 mmol/L    Chloride 95 (L) 98 - 107 mmol/L    CO2 34 (H) 22 - 29 mmol/L    Anion Gap 10 7 - 16 mmol/L    Glucose 121 (H) 74 - 99 mg/dL    BUN 21 8 - 23 mg/dL    CREATININE 0.8 0.5 - 1.0 mg/dL    GFR Non-African American >60 >=60 mL/min/1.73    GFR African American >60     Calcium 8.9 8.6 - 10.2 mg/dL    Total Protein 6.9 6.4 - 8.3 g/dL    Alb 3.8 3.5 - 5.2 g/dL    Total Bilirubin 1.1 0.0 - 1.2 mg/dL    Alkaline Phosphatase 78 35 - 104 U/L    ALT 17 0 - 32 U/L    AST 31 0 - 31 U/L   Magnesium   Result Value Ref Range    Magnesium 2.1 1.6 - 2.6 mg/dL   Lipase   Result Value Ref Range    Lipase 27 13 - 60 U/L   Troponin   Result Value Ref Range    Troponin 0.02 0.00 - 0.03 ng/mL   SPECIMEN REJECTION   Result Value Ref Range    Rejected Test DIMER, PTT, PT     Reason for Rejection see below    EKG 12 Lead   Result Value Ref Range    Ventricular Rate 74 BPM    Atrial Rate 208 BPM    QRS Duration 88 ms    Q-T Interval 632 ms    QTc Calculation (Bazett) 701 ms    R Axis -83 degrees    T Axis 13 degrees       RADIOLOGY:  Interpreted by Radiologist.  XR CHEST PORTABLE   Final Result      Mild cardiomegaly      No evidence of airspace consolidation or pulmonary venous congestion         CT HEAD WO CONTRAST   Final Result      NO ACUTE INTRACRANIAL PROCESS               EKG:  This EKG is signed and interpreted by the EP.    Time: 14:56  Rate: 74  Rhythm: atrial fibrillation with variable AV block with premature ventricular/aberrantly conducted complexes. Interpretation: left anterior fascicular block, nonspecific ST changes, prolonged QT  Comparison: nonspecific changes    ------------------------- NURSING NOTES AND VITALS REVIEWED ---------------------------   The nursing notes within the ED encounter and vital signs as below have been reviewed by myself. BP (!) 102/58   Pulse 69   Temp 97.4 °F (36.3 °C) (Oral)   Resp 16   Ht 5' 6\" (1.676 m)   Wt 166 lb (75.3 kg)   SpO2 93%   BMI 26.79 kg/m²   Oxygen Saturation Interpretation: Normal    The patients available past medical records and past encounters were reviewed. ------------------------------ ED COURSE/MEDICAL DECISION MAKING----------------------  Medications - No data to display    Medical Decision Making:    Patient comes to the ED after passing out. Reports that she has had lightheadedness since 1 day ago. Had chest XR, CT head, lab work, and EKG ordered. Results were reviewed with the patient. This patient's ED course included: a personal history and physicial examination and re-evaluation prior to disposition  This patient has remained hemodynamically stable during their ED course. Re-Evaluations:           Time 6:20 PM  Patient is stable. Discussed results. Upon re-examination, patient is stable. The emergency provider has shared the specific reasons for admission, and has shared results of today's workup. The patient is agreeable for admission. Consultations:             Time: 6:17 PM.   Spoke with Dr. Regina Garcia (Medicine). Discussed case. They will admit this patient. Counseling: The emergency provider has spoken with the patient and family members and discussed todays results, in addition to providing specific details for the plan of care and counseling regarding the diagnosis and prognosis.   Questions are answered at this time

## 2019-05-31 VITALS
WEIGHT: 170.8 LBS | HEIGHT: 66 IN | SYSTOLIC BLOOD PRESSURE: 133 MMHG | OXYGEN SATURATION: 95 % | DIASTOLIC BLOOD PRESSURE: 60 MMHG | RESPIRATION RATE: 18 BRPM | TEMPERATURE: 98.8 F | BODY MASS INDEX: 27.45 KG/M2 | HEART RATE: 60 BPM

## 2019-05-31 LAB
ANION GAP SERPL CALCULATED.3IONS-SCNC: 10 MMOL/L (ref 7–16)
BASOPHILS ABSOLUTE: 0.02 E9/L (ref 0–0.2)
BASOPHILS RELATIVE PERCENT: 0.3 % (ref 0–2)
BUN BLDV-MCNC: 19 MG/DL (ref 8–23)
CALCIUM SERPL-MCNC: 8.7 MG/DL (ref 8.6–10.2)
CHLORIDE BLD-SCNC: 102 MMOL/L (ref 98–107)
CO2: 30 MMOL/L (ref 22–29)
CREAT SERPL-MCNC: 0.8 MG/DL (ref 0.5–1)
EOSINOPHILS ABSOLUTE: 0.06 E9/L (ref 0.05–0.5)
EOSINOPHILS RELATIVE PERCENT: 0.8 % (ref 0–6)
GFR AFRICAN AMERICAN: >60
GFR NON-AFRICAN AMERICAN: >60 ML/MIN/1.73
GLUCOSE BLD-MCNC: 85 MG/DL (ref 74–99)
HCT VFR BLD CALC: 38.2 % (ref 34–48)
HEMOGLOBIN: 11.9 G/DL (ref 11.5–15.5)
IMMATURE GRANULOCYTES #: 0.03 E9/L
IMMATURE GRANULOCYTES %: 0.4 % (ref 0–5)
LYMPHOCYTES ABSOLUTE: 1.94 E9/L (ref 1.5–4)
LYMPHOCYTES RELATIVE PERCENT: 24.9 % (ref 20–42)
MAGNESIUM: 2 MG/DL (ref 1.6–2.6)
MCH RBC QN AUTO: 30.7 PG (ref 26–35)
MCHC RBC AUTO-ENTMCNC: 31.2 % (ref 32–34.5)
MCV RBC AUTO: 98.7 FL (ref 80–99.9)
MONOCYTES ABSOLUTE: 0.74 E9/L (ref 0.1–0.95)
MONOCYTES RELATIVE PERCENT: 9.5 % (ref 2–12)
NEUTROPHILS ABSOLUTE: 5.01 E9/L (ref 1.8–7.3)
NEUTROPHILS RELATIVE PERCENT: 64.1 % (ref 43–80)
PDW BLD-RTO: 14.3 FL (ref 11.5–15)
PLATELET # BLD: 186 E9/L (ref 130–450)
PMV BLD AUTO: 10.4 FL (ref 7–12)
POTASSIUM SERPL-SCNC: 4.4 MMOL/L (ref 3.5–5)
RBC # BLD: 3.87 E12/L (ref 3.5–5.5)
SODIUM BLD-SCNC: 142 MMOL/L (ref 132–146)
TROPONIN: <0.01 NG/ML (ref 0–0.03)
WBC # BLD: 7.8 E9/L (ref 4.5–11.5)

## 2019-05-31 PROCEDURE — 80048 BASIC METABOLIC PNL TOTAL CA: CPT

## 2019-05-31 PROCEDURE — 99215 OFFICE O/P EST HI 40 MIN: CPT | Performed by: INTERNAL MEDICINE

## 2019-05-31 PROCEDURE — G0378 HOSPITAL OBSERVATION PER HR: HCPCS

## 2019-05-31 PROCEDURE — 36415 COLL VENOUS BLD VENIPUNCTURE: CPT

## 2019-05-31 PROCEDURE — 6360000002 HC RX W HCPCS: Performed by: FAMILY MEDICINE

## 2019-05-31 PROCEDURE — 85025 COMPLETE CBC W/AUTO DIFF WBC: CPT

## 2019-05-31 PROCEDURE — 93005 ELECTROCARDIOGRAM TRACING: CPT | Performed by: INTERNAL MEDICINE

## 2019-05-31 PROCEDURE — 83735 ASSAY OF MAGNESIUM: CPT

## 2019-05-31 PROCEDURE — 84484 ASSAY OF TROPONIN QUANT: CPT

## 2019-05-31 PROCEDURE — 6370000000 HC RX 637 (ALT 250 FOR IP): Performed by: INTERNAL MEDICINE

## 2019-05-31 PROCEDURE — 2580000003 HC RX 258: Performed by: INTERNAL MEDICINE

## 2019-05-31 PROCEDURE — 97161 PT EVAL LOW COMPLEX 20 MIN: CPT

## 2019-05-31 RX ADMIN — Medication 10 ML: at 16:13

## 2019-05-31 RX ADMIN — LISINOPRIL 5 MG: 5 TABLET ORAL at 08:38

## 2019-05-31 RX ADMIN — FAMOTIDINE 20 MG: 20 TABLET ORAL at 08:38

## 2019-05-31 RX ADMIN — Medication 10 ML: at 08:38

## 2019-05-31 RX ADMIN — PAROXETINE HYDROCHLORIDE 20 MG: 20 TABLET, FILM COATED ORAL at 08:38

## 2019-05-31 RX ADMIN — Medication 500 UNITS: at 16:12

## 2019-05-31 RX ADMIN — CALCIUM 500 MG: 500 TABLET ORAL at 08:37

## 2019-05-31 RX ADMIN — FERROUS SULFATE TAB 325 MG (65 MG ELEMENTAL FE) 325 MG: 325 (65 FE) TAB at 08:38

## 2019-05-31 ASSESSMENT — PAIN SCALES - GENERAL: PAINLEVEL_OUTOF10: 0

## 2019-05-31 NOTE — PROGRESS NOTES
Joshua Singletary is a 72 y.o. female patient. Current Facility-Administered Medications   Medication Dose Route Frequency Provider Last Rate Last Dose    ALPRAZolam (XANAX) tablet 0.25 mg  0.25 mg Oral TID PRN Prashanth Hoang MD   0.25 mg at 05/30/19 2255    atorvastatin (LIPITOR) tablet 10 mg  10 mg Oral Nightly Prashanth Hoang MD   10 mg at 05/30/19 2255    calcium elemental (OSCAL) tablet 500 mg  500 mg Oral QAM Prashanth Hoang MD        ferrous sulfate tablet 325 mg  325 mg Oral BID Prashanth Hoang MD   325 mg at 05/30/19 2255    lisinopril (PRINIVIL;ZESTRIL) tablet 5 mg  5 mg Oral Daily Prashanth Hoang MD        PARoxetine (PAXIL) tablet 20 mg  20 mg Oral QAM Prashanth Hoang MD        rivaroxaban (XARELTO) tablet 20 mg  20 mg Oral Dinner Prashanth Hoang MD        0.9 % sodium chloride infusion   Intravenous Continuous Prashanth Hoang MD 50 mL/hr at 05/30/19 2307      sodium chloride flush 0.9 % injection 10 mL  10 mL Intravenous 2 times per day Prashanth Hoang MD   10 mL at 05/30/19 2256    sodium chloride flush 0.9 % injection 10 mL  10 mL Intravenous PRN Prashanth Hoang MD        magnesium hydroxide (MILK OF MAGNESIA) 400 MG/5ML suspension 30 mL  30 mL Oral Daily PRN Prashanth Hoang MD        famotidine (PEPCID) tablet 20 mg  20 mg Oral BID Prashanth Hoang MD   20 mg at 05/30/19 2255    acetaminophen (TYLENOL) tablet 650 mg  650 mg Oral Q4H PRN Prashanth Hoang MD         Allergies   Allergen Reactions    Sulfa Antibiotics      Active Problems:    Syncope and collapse  Resolved Problems:    * No resolved hospital problems. *    Blood pressure 128/68, pulse 57, temperature 98.2 °F (36.8 °C), temperature source Oral, resp. rate 16, height 5' 6\" (1.676 m), weight 170 lb 12.8 oz (77.5 kg), SpO2 95 %, not currently breastfeeding.     Subjectiveadmitted after syncopal episode with head injury on blood thinners for afib, IN ER APPARENTLY BRADYCARDIC WITH BLOCK, PHYSICIANS HELD MEDS, MADE HER NPO, CXR CLEAR, CT NEGATIVE FOR BLEED  Objective Blood pressure 128/68, pulse 57, temperature 98.2 °F (36.8 °C), temperature source Oral, resp. rate 16, height 5' 6\" (1.676 m), weight 170 lb 12.8 oz (77.5 kg), SpO2 95 %, not currently breastfeeding.   AWAKE, ALERT  PERRL, EOMI  HEART REGULAR BUT FLUTTER ON MONITOR  LUNGS CTAB  ABD SOFT, POSITIVE BS  NO EDEMA    Assessment & Plan  Syncope  A flutter  htn  Hl  Anxiety, depression  PT NPO FROM LAST PM, WILL WAIT UNTIL CARDIO EVAL HER  Kelechi Luis Miguel, DO  5/31/2019

## 2019-05-31 NOTE — PROGRESS NOTES
Spoke with  regarding patient's discharge, Dr. Anders Reasoner to please contact cardiologist to review patients home medications and when done he will do the discharge.

## 2019-05-31 NOTE — CONSULTS
INPATIENT CARDIOLOGY CONSULT    Name: Nancy Everett    Age: 72 y.o. Date of Admission: 5/30/2019  2:17 PM    Date of Service: 5/31/2019    Reason for Consultation: Syncope    Referring Physician: Mary Pindea DO    History of Present Illness:  Nancy Everett is a 72 y.o. female (known to me and Dr. Jair Ang) who presented on 5/30/2019 for further evaluation of syncope. Her PMH is outlined in detail below (see A/P below). Recently, she's been active with no complaints of chest pain or SOB, No recent palpitations, PND, or orthopnea. She reports a ~ 2 day history of poor po intake and nausea prior to admission. She experienced a syncopal episode on the day of admission. She reports a definite clinical improvement since admission. She wants to go home. Currently receiving IV fluids. She is currently with no active cardiac complaints at rest. SB/SR on telemetry.     Review of Systems:   Cardiac: As per HPI  General: No fever, chills  Pulmonary: As per HPI  HEENT: No visual disturbances, difficult swallowing  GI: No nausea, vomiting  Musculoskeletal: STEWART x 4, no focal motor deficits  Skin: Intact, no rashes  Neuro/Psych: No headache or seizures    Past Medical History:  Past Medical History:   Diagnosis Date    AF (atrial fibrillation) (HCC)     Anxiety     Breast cancer (HCC)     right chest wall involvement    Depression     Lymphadenopathy     Osteoporosis     Radiation     Status post chemotherapy     neoadjuvant chemo with AC, Taxol, carboplatin, and Herceptin     Past Surgical History:  Past Surgical History:   Procedure Laterality Date    BRONCHOSCOPY  7/12/12    ebus    CARDIAC CATHETERIZATION Right 4-3-2014    Dr. Ela Walter    CARDIOVERSION  11/16/2016    MASTECTOMY      RT - 2010    MASTECTOMY Right 02/06/2009    TONSILLECTOMY      TUNNELED VENOUS PORT PLACEMENT         Family History:  Family History   Problem Relation Age of Onset    Cancer Mother         female organs    Cancer Father bowel?   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   Social Needs    Financial resource strain: Not on file    Food insecurity:     Worry: Not on file     Inability: Not on file    Transportation needs:     Medical: Not on file     Non-medical: Not on file   Tobacco Use    Smoking status: Never Smoker    Smokeless tobacco: Never Used   Substance and Sexual Activity    Alcohol use: No    Drug use: No    Sexual activity: Never   Lifestyle    Physical activity:     Days per week: Not on file     Minutes per session: Not on file    Stress: Not on file   Relationships    Social connections:     Talks on phone: Not on file     Gets together: Not on file     Attends [employer] service: Not on file     Active member of club or organization: Not on file     Attends meetings of clubs or organizations: Not on file     Relationship status: Not on file    Intimate partner violence:     Fear of current or ex partner: Not on file     Emotionally abused: Not on file     Physically abused: Not on file     Forced sexual activity: Not on file   Other Topics Concern    Not on file   Social History Narrative    Not on file       Allergies: Allergies   Allergen Reactions    Sulfa Antibiotics        Home Medications:  Prior to Admission medications    Medication Sig Start Date End Date Taking?  Authorizing Provider   rivaroxaban (XARELTO) 20 MG TABS tablet Take 1 tablet by mouth Daily with supper 3/8/19  Yes CHAPARRO Buck - CNS   bumetanide (BUMEX) 1 MG tablet Take 1 tablet by mouth daily  Patient taking differently: Take 1 mg by mouth daily Take additional dose as needed 2/22/19  Yes GIOVANNI Carranza   lisinopril (PRINIVIL;ZESTRIL) 5 MG Donna Wilkins MD        0.9 % sodium chloride infusion   Intravenous Continuous Kamila Wolfe MD 50 mL/hr at 05/31/19 0830      sodium chloride flush 0.9 % injection 10 mL  10 mL Intravenous 2 times per day Kamila Wolfe MD   10 mL at 05/31/19 0838    sodium chloride flush 0.9 % injection 10 mL  10 mL Intravenous PRN Kamila Wolfe MD        magnesium hydroxide (MILK OF MAGNESIA) 400 MG/5ML suspension 30 mL  30 mL Oral Daily PRN Kamila Wolfe MD        famotidine (PEPCID) tablet 20 mg  20 mg Oral BID Kamila Wolfe MD   20 mg at 05/31/19 0838    acetaminophen (TYLENOL) tablet 650 mg  650 mg Oral Q4H PRN Kamila Wolfe MD          sodium chloride 50 mL/hr at 05/31/19 0830       Physical Exam:  /60   Pulse 60   Temp 98.8 °F (37.1 °C) (Oral)   Resp 18   Ht 5' 6\" (1.676 m)   Wt 170 lb 12.8 oz (77.5 kg)   SpO2 95%   BMI 27.57 kg/m²   Wt Readings from Last 3 Encounters:   05/31/19 170 lb 12.8 oz (77.5 kg)   03/08/19 168 lb (76.2 kg)   02/22/19 165 lb 3.2 oz (74.9 kg)     Appearance: Awake, alert, no acute respiratory distress  Skin: Intact, no rash  Head: Normocephalic, atraumatic  Eyes: EOMI, no conjunctival erythema  ENMT: No pharyngeal erythema, MMM, no rhinorrhea  Neck: Supple, no carotid bruits  Lungs: Clear to auscultation bilaterally. No wheezes, rales, or rhonchi.   Cardiac: Regular rate and rhythm, +S1S2, no murmurs apparent  Abdomen: Soft, nontender, +bowel sounds  Extremities: Moves all extremities x 4, no lower extremity edema  Neurologic: No focal motor deficits apparent, normal mood and affect    Intake/Output:    Intake/Output Summary (Last 24 hours) at 5/31/2019 1437  Last data filed at 5/31/2019 1416  Gross per 24 hour   Intake 180 ml   Output --   Net 180 ml     I/O this shift:  In: 180 [P.O.:180]  Out: -     Laboratory Tests:  Recent Labs     05/30/19  1626 05/31/19  0605    142   K 3.5 4.4   CL 95* 102   CO2 34* 30*   BUN 21 19   CREATININE 0.8 0.8   GLUCOSE 121* 85 CALCIUM 8.9 8.7     Lab Results   Component Value Date    MG 2.0 05/31/2019     Recent Labs     05/30/19  1626   ALKPHOS 78   ALT 17   AST 31   PROT 6.9   BILITOT 1.1   LABALBU 3.8     Recent Labs     05/30/19  1626 05/31/19  0145   WBC 9.1 7.8   RBC 4.12 3.87   HGB 12.9 11.9   HCT 40.7 38.2   MCV 98.8 98.7   MCH 31.3 30.7   MCHC 31.7* 31.2*   RDW 14.3 14.3    186   MPV 10.7 10.4     Lab Results   Component Value Date    TROPONINI <0.01 05/31/2019    TROPONINI 0.02 05/30/2019    TROPONINI <0.01 01/15/2019     Lab Results   Component Value Date    INR 3.9 01/15/2019    INR 3.7 04/06/2014    INR 3.3 04/04/2014    PROTIME 43.1 (H) 01/15/2019    PROTIME 23.2 (H) 04/06/2014    PROTIME 21.7 (H) 04/04/2014     Lab Results   Component Value Date    TSH 2.140 01/15/2019     Lab Results   Component Value Date    CHOL 116 01/16/2019    CHOL 194 05/11/2015     Lab Results   Component Value Date    TRIG 114 01/16/2019    TRIG 166 (H) 05/11/2015     Lab Results   Component Value Date    HDL 30 01/16/2019    HDL 28 05/11/2015     Lab Results   Component Value Date    LDLCALC 63 01/16/2019    LDLCALC 133 (H) 05/11/2015     Lab Results   Component Value Date    LABVLDL 23 01/16/2019    LABVLDL 33 05/11/2015     No results found for: CHOLHDLRATIO  No results for input(s): PROBNP in the last 72 hours.     Cardiac Tests:    Echocardiogram: 11/2/16 (Dr. Hari Almendarez)   Left ventricular internal dimensions, wall thickness, regional wall motion   and systolic function are normal.   Ejection fraction is visually estimated at 55%.   There is Doppler evidence for stage III diastolic dysfunction.   The left atrium is severely enlarged as determined by the left atrial volume index.  Bernardino Laundry is evidence for impaired global right ventricular systolic function.   The right atrium appears to be enlarged.   The aortic valve appears mildly sclerotic.   Mild tricuspid regurgitation is present.   Estimated right ventricular systolic pressure is 40 - 44 mmHg and mildly     Echocardiogram: 1/16/19 (Dr. Matt Santa)   Aortic valve opens well.   No wall motion abnormalities.   Ejection fraction is visually estimated at 60-65%. Normal right ventricular size and function.   The left atrium is moderately dilated.   Markedly enlarged right atrium size.   Probable atrial fibrillation   Mild centrally directed mitral regurgitation.   The aortic valve appears mildly sclerotic.   Moderate tricuspid regurgitation.  RVSP is 68 mmHg.   TR velocity = 4.1 m/s   Pulmonary hypertension is severe . Impression:   1. Syncope / orthostatic hypotension -- recent nausea and poor po intake; clinically improved  2. Hypokalemia -- K replaced, most recent K 4.4  3. Persistent atrial fibrillation -- on Tikosyn, Toprol XL, and xarelto; follows with Dr. Brooke Ruiz  4. NICM (r/o prior tachycardia-induced CM component) -- EF improved on most recent echocardiograms  5. Negative cardiac catheterization in 4/2014  6. Chronic HFpEF  7. Moderate TR / PHTN  8. Right-sided breast cancer s/p prior treatment    - Clinically improved after receiving IV fluids / resume po diuretic tomorrow  - Keep K > 4 and Mg > 2 -- improved s/p replacement of K  - Continue Toprol XL and lisinopril  - Monitor QTc on Tikosyn  - Continue xarelto  - Results of most recent cardiac studies outlined above  - Outpatient cardiology/EP follow-up    Thank you for allowing me to participate in your patient's care. Please feel free to contact me if you have any questions or concerns.     Jeniffer Lakhani MD  [facility]) Cardiology

## 2019-05-31 NOTE — PROGRESS NOTES
Occupational Therapy  OCCUPATIONAL THERAPY INITIAL EVALUATION      Date:2019  Patient Name: Effie Goldmann  MRN: [id number]  : 1945  Room: [address] [place], [place] [address]    Evaluating OT: Jennifer Molina OTR/L [id number]    AM-PAC Daily Activity Raw Score:     Recommended Adaptive Equipment: none      Diagnosis: syncope and collapse. Pt presents to ED after passing out while shopping. Past Medical History: breast CA s/p R mastectomy pt reports having a compression sleeve for daily use at home  Precautions: falls, O2     Home Living/PLOF:   Patient lives with her son in a single story home with 2 steps B HR on entry. Pt's bathroom has tub/shower combo with grab bars and shower chair. Pt was Independent with ADL/IADLs. Pt performs functional mobility with no AD. Pt drives.      Pain Level: pt c/o no pain this session      Hearing: WFL   Vision: WFL        Cognition: oriented x 4   Problem Solving: good   Safety Awareness: good      UE Assessment:     ROM:  RUE: WFL   LUE: WFL      B UE FMC/GMC WFL     Strength:  R UE: 4/5    L UE: 4/5     Functional Assessment:   Initial Eval Status  Date: 19   Feeding Independent   Grooming Independent   UB Dressing Independent   LB Dressing Independent   Bathing Independent   Toileting Independent   Bed Mobility  Supine <> sit: Independent   Functional Transfers Independent   Functional Mobility Independent no AD  Hallway distance   Balance Sitting: Good    Standing: Fair plus   Activity Tolerance WFL        Strength ROM Additional Info:    RUE  WFL WFL good  and wfl FMC/dexterity noted during ADL tasks       LUE WFL WFL good  and wfl FMC/dexterity noted during ADL tasks         Hearing: WFL   Vision: WFL   Sensation:  No c/o numbness or tingling   Edema: none                             Pt educated on purpose of OT services with verbalized understanding. Pt reports no need for further OT interventions at this time.  OT evaluation only no indicated need for further skilled OT services. Please reconsult if any new need arises. Eval Complexity: Low    Upon arrival, patient supine in bed. At end of session, patient seated EOB with call light and phone within reach, all lines and tubes intact. Low Evaluation     Evaluation time includes thorough review of current medical information, gathering information on past medical history/social history and prior level of function, completion of standardized testing/informal observation of tasks and assessment of data.     Rodolfo Lemons OTR/L  [id number]

## 2019-05-31 NOTE — PROGRESS NOTES
P Quality Flow/Interdisciplinary Rounds Progress Note        Quality Flow Rounds held on May 31, 2019    Disciplines Attending:  Bedside Nurse, ,  and Nursing Unit Leadership    Mark Caldera was admitted on 5/30/2019  2:17 PM    Anticipated Discharge Date:  Expected Discharge Date: 06/01/19    Disposition:    Chidi Score:  Chidi Scale Score: 21    Readmission Risk              Risk of Unplanned Readmission:        15           Discussed patient goal for the day, patient clinical progression, and barriers to discharge.   The following Goal(s) of the Day/Commitment(s) have been identified:  Cardiology consult/[facility]  May 31, 2019

## 2019-05-31 NOTE — PROGRESS NOTES
Physical Therapy    Facility/Department: [address]  Initial Assessment    NAME: Elayne Severs  : 1945  MRN: [id number]    Date of Service: 2019    Evaluating Therapist: Maritza Greene. Chetan Zheng P.T. Room #: 5491/9707-I  DIAGNOSIS: Syncope and collapse  PRECAUTIONS: none    Social:  Pt lives with son, daughter-in-law, and 2 grandchildren in a 1 floor plan 2 steps and 1 rails to enter and her bed and bath are on 1st floor. Prior to admission pt walked with no AD     Initial Evaluation  Date: 19   Was pt agreeable to Eval/treatment? yes   Does pt have pain? No c/o pain   Bed Mobility  Rolling: Independent  Supine to sit: Independent  Sit to supine: Independent  Scooting: Independent   Transfers Sit to stand: Independent  Stand to sit: Independent  Stand pivot: Independent   Ambulation    150 feet x 2 reps with no AD Independent   Stair negotiation: ascended and descended 3 steps with 1 rail Independent   AM-PAC Raw Score  24/24     Pt is alert and Oriented x4  BLE ROM is WFL. BLE strength is grossly 4+/5. Balance: Independent  Sensation: No c/o numbness or tingling. Edema: none noted  Endurance: fair+     ASSESSMENT  Pt displays functional ability as noted in the objective portion of this evaluation. Comments/Treatment:  Pt is Independent with functional mobility at this time and had no c/o light headedness or dizziness at this time. Pt was left in bed with call light in reach. Pts/ family goals   1. To go home. Patient and or family understand(s) diagnosis, prognosis, and plan of care. PLAN  PT will discharge pt from our service at this time. Time in: 09:15  Time out: 09:35    Apollo Zheng, P.T.    License number:  [id number]

## 2019-05-31 NOTE — PROGRESS NOTES
Contacted Dr. Seymour Back via secure text for consult. Dr. Patience Santoyo back, will see pt in the morning.

## 2019-05-31 NOTE — PROGRESS NOTES
Called Dr. Chinmay Hair answering service, talked to  on-call, Dr. Noble Cool said to contact [facility] cardiology.

## 2019-05-31 NOTE — H&P
succinate (TOPROL XL) 25 MG extended release tablet, Take 0.5 tablets by mouth daily (Patient taking differently: Take 12.5 mg by mouth every morning )  ferrous sulfate 325 (65 Fe) MG tablet, Take 325 mg by mouth 2 times daily  PARoxetine (PAXIL) 20 MG tablet, Take 1 tablet by mouth every morning  atorvastatin (LIPITOR) 10 MG tablet, Take 10 mg by mouth nightly   ALPRAZolam (XANAX) 0.25 MG tablet, Take 0.25 mg by mouth 3 times daily as needed for Anxiety. .  calcium carbonate (OSCAL) 500 MG TABS tablet, Take 500 mg by mouth every morning   potassium chloride (KLOR-CON M) 20 MEQ extended release tablet, Take 1 tablet by mouth daily as needed (take with Lasix) (Patient taking differently: Take 20 mEq by mouth as needed (take with Lasix) )    Allergies:  Sulfa antibiotics    Social History:   TOBACCO:   reports that she has never smoked. She has never used smokeless tobacco.  ETOH:   reports that she does not drink alcohol. Family History:       Problem Relation Age of Onset    Cancer Mother         female organs    Cancer Father         bowel?  Breast Cancer Maternal Aunt        REVIEW OF SYSTEMS:  CONSTITUTIONAL: as above  EYES:  Neg  blurriness,tearing,itching or acute change in vision  NOSE:  Neg  rhinorrhea,sneezing,itching,allergy or epistaxis  MOUTH/THROAT:  Neg  bleeding gums,hoarseness or sore throat. RESPIRATORY:   Neg SOB,wheeze,cough,sputum,hemoptysis or bronochitis  CARDIOVASCULAR  As above  GASTROINTESTINAL:  Neg   Appetite changes,nausea,vomiting,or diarrhea,indigestion,dysphagia,change in bowel movements, or abdominal pain. NEUROLOGICAL:  As above  SKIN :  Neg  skin or hair changes,and has no itching,rashes,sores.     PHYSICAL EXAM:  /68   Pulse 57   Temp 98.2 °F (36.8 °C) (Oral)   Resp 16   Ht 5' 6\" (1.676 m)   Wt 166 lb (75.3 kg)   SpO2 95%   BMI 26.79 kg/m²   General appearance: alert, appears stated age, cooperative and no distress  Head: Minimal swelling and bruising on the forehead and the left temple. Eyes: conjunctivae/corneas clear. PERRL, EOM's intact. Ears: normal external ear canals both ears  Neck: no adenopathy, no carotid bruit, no JVD, supple, symmetrical, trachea midline and thyroid not enlarged, no tenderness/mass/nodules  Lungs: clear to auscultation bilaterally, symmetrical expansion  Heart: frequent irregularity with auscultation, no precordial heave. Monitor shows bradycardia rate is 45 with frequent PVCs couplets. Abdomen:soft, non-tender; non-distended normal bowel sounds no masses, no organomegaly  Extremities:Pedal edema none  Homans sign is negative, no sign of DVT  Skin: Skin color, texture, turgor normal. No rashes or lesions  Neurologic:Mental status: Alert, oriented, patient seemed to be not very clear, seemed to be somewhat slow with answering her questions.   Cranial nerves:II-XII Grossly intact  Sensory: normal  Motor:grossly normal    DATA:  Recent Labs     05/30/19  1626   WBC 9.1   HGB 12.9   HCT 40.7   MCV 98.8        Recent Labs     05/30/19  1626      K 3.5   CL 95*   CO2 34*   BUN 21   CREATININE 0.8   GLUCOSE 121*     Recent Labs     05/30/19  1626   AST 31   ALT 17   BILITOT 1.1   ALKPHOS 78     Recent Labs     05/30/19  1626   TROPONINI 0.02     Lab Results   Component Value Date    INR 3.9 01/15/2019    INR 3.7 04/06/2014    INR 3.3 04/04/2014    PROTIME 43.1 (H) 01/15/2019    PROTIME 23.2 (H) 04/06/2014    PROTIME 21.7 (H) 04/04/2014        U/A:  No results found for: NITRITE, COLORU, PHUR, LABCAST, WBCUA, RBCUA, MUCUS, TRICHOMONAS, YEAST, BACTERIA, CLARITYU, SPECGRAV, LEUKOCYTESUR, UROBILINOGEN, BILIRUBINUR, BLOODU, GLUCOSEU, AMORPHOUS       RADIOLOGY:   XR CHEST PORTABLE   Final Result      Mild cardiomegaly      No evidence of airspace consolidation or pulmonary venous congestion         CT HEAD WO CONTRAST   Final Result      NO ACUTE INTRACRANIAL PROCESS             ASSESSMENT:  Syncope and collapse  Atrial fibrillation with bradycardia  Frequent PVCs couplets and short run of V. Tach  Positive orthostasis        PLAN:  We will discontinue diuretics and start gentle hydration  We will discontinue metoprolol in view of bradycardia arrhythmia  Electrophysiology consult.   See orders  Disposition:      Electronically signed by Luther Riley MD on 5/30/2019 at 8:59 PM

## 2019-06-01 LAB
EKG ATRIAL RATE: 58 BPM
EKG P AXIS: 29 DEGREES
EKG P-R INTERVAL: 140 MS
EKG Q-T INTERVAL: 562 MS
EKG QRS DURATION: 80 MS
EKG QTC CALCULATION (BAZETT): 551 MS
EKG R AXIS: -94 DEGREES
EKG T AXIS: 22 DEGREES
EKG VENTRICULAR RATE: 58 BPM

## 2019-06-01 PROCEDURE — 93010 ELECTROCARDIOGRAM REPORT: CPT | Performed by: INTERNAL MEDICINE

## 2019-06-03 ENCOUNTER — TELEPHONE (OUTPATIENT)
Dept: NON INVASIVE DIAGNOSTICS | Age: 74
End: 2019-06-03

## 2019-06-03 ENCOUNTER — TELEPHONE (OUTPATIENT)
Dept: ADMINISTRATIVE | Age: 74
End: 2019-06-03

## 2019-06-03 NOTE — TELEPHONE ENCOUNTER
Pt called to schedule hosp f/u for an episode of synope with Dr Zion Walter.  Please call pt with appt info

## 2019-06-04 ENCOUNTER — TELEPHONE (OUTPATIENT)
Dept: CARDIOLOGY CLINIC | Age: 74
End: 2019-06-04

## 2019-06-04 NOTE — TELEPHONE ENCOUNTER
Left patient a message to call our office to schedule a hfu appt with Johnathan Jurado or fabian DAVILA. [address] [address] on 5/31/19.

## 2019-06-04 NOTE — TELEPHONE ENCOUNTER
DO Hilda Shannon             Appears her episode was related to dehydration.  No need to move appt up at present. ALK    Previous Messages      ----- Message -----   From: Jose Cristobal   Sent: 6/3/2019   1:49 PM   To: Beth Cesar DO     Pt calling for hfu, currently scheduled for appt with you 09/10. Please advise.

## 2019-07-31 ENCOUNTER — OFFICE VISIT (OUTPATIENT)
Dept: ONCOLOGY | Age: 74
End: 2019-07-31
Payer: COMMERCIAL

## 2019-07-31 ENCOUNTER — HOSPITAL ENCOUNTER (OUTPATIENT)
Dept: INFUSION THERAPY | Age: 74
Discharge: HOME | End: 2019-07-31
Payer: COMMERCIAL

## 2019-07-31 VITALS
DIASTOLIC BLOOD PRESSURE: 61 MMHG | BODY MASS INDEX: 26.84 KG/M2 | SYSTOLIC BLOOD PRESSURE: 101 MMHG | HEIGHT: 66 IN | TEMPERATURE: 97.3 F | HEART RATE: 103 BPM | WEIGHT: 167 LBS

## 2019-07-31 DIAGNOSIS — C77.3 BREAST CANCER METASTASIZED TO AXILLARY LYMPH NODE, RIGHT (HCC): Primary | ICD-10-CM

## 2019-07-31 DIAGNOSIS — C50.911 BREAST CANCER METASTASIZED TO AXILLARY LYMPH NODE, RIGHT (HCC): Primary | ICD-10-CM

## 2019-07-31 DIAGNOSIS — C50.911 CARCINOMA OF RIGHT BREAST METASTATIC TO AXILLARY LYMPH NODE (HCC): ICD-10-CM

## 2019-07-31 DIAGNOSIS — C77.3 CARCINOMA OF RIGHT BREAST METASTATIC TO AXILLARY LYMPH NODE (HCC): ICD-10-CM

## 2019-07-31 DIAGNOSIS — D50.8 OTHER IRON DEFICIENCY ANEMIA: ICD-10-CM

## 2019-07-31 LAB
ALBUMIN SERPL-MCNC: 3.7 G/DL (ref 3.5–5.2)
ALP BLD-CCNC: 90 U/L (ref 35–104)
ALT SERPL-CCNC: 10 U/L (ref 0–32)
ANION GAP SERPL CALCULATED.3IONS-SCNC: 12 MMOL/L (ref 7–16)
AST SERPL-CCNC: 19 U/L (ref 0–31)
BASOPHILS ABSOLUTE: 0.04 E9/L (ref 0–0.2)
BASOPHILS RELATIVE PERCENT: 0.6 % (ref 0–2)
BILIRUB SERPL-MCNC: 0.7 MG/DL (ref 0–1.2)
BUN BLDV-MCNC: 18 MG/DL (ref 8–23)
CALCIUM SERPL-MCNC: 8.7 MG/DL (ref 8.6–10.2)
CHLORIDE BLD-SCNC: 99 MMOL/L (ref 98–107)
CO2: 30 MMOL/L (ref 22–29)
CREAT SERPL-MCNC: 0.8 MG/DL (ref 0.5–1)
EOSINOPHILS ABSOLUTE: 0.15 E9/L (ref 0.05–0.5)
EOSINOPHILS RELATIVE PERCENT: 2.4 % (ref 0–6)
GFR AFRICAN AMERICAN: >60
GFR NON-AFRICAN AMERICAN: >60 ML/MIN/1.73
GLUCOSE BLD-MCNC: 134 MG/DL (ref 74–99)
HCT VFR BLD CALC: 42.9 % (ref 34–48)
HEMOGLOBIN: 13.3 G/DL (ref 11.5–15.5)
IMMATURE GRANULOCYTES #: 0.01 E9/L
IMMATURE GRANULOCYTES %: 0.2 % (ref 0–5)
LYMPHOCYTES ABSOLUTE: 2.08 E9/L (ref 1.5–4)
LYMPHOCYTES RELATIVE PERCENT: 33.4 % (ref 20–42)
MCH RBC QN AUTO: 29.5 PG (ref 26–35)
MCHC RBC AUTO-ENTMCNC: 31 % (ref 32–34.5)
MCV RBC AUTO: 95.1 FL (ref 80–99.9)
MONOCYTES ABSOLUTE: 0.6 E9/L (ref 0.1–0.95)
MONOCYTES RELATIVE PERCENT: 9.6 % (ref 2–12)
NEUTROPHILS ABSOLUTE: 3.35 E9/L (ref 1.8–7.3)
NEUTROPHILS RELATIVE PERCENT: 53.8 % (ref 43–80)
PDW BLD-RTO: 14.6 FL (ref 11.5–15)
PLATELET # BLD: 209 E9/L (ref 130–450)
PMV BLD AUTO: 9.7 FL (ref 7–12)
POTASSIUM SERPL-SCNC: 3.2 MMOL/L (ref 3.5–5)
RBC # BLD: 4.51 E12/L (ref 3.5–5.5)
SODIUM BLD-SCNC: 141 MMOL/L (ref 132–146)
TOTAL PROTEIN: 6.7 G/DL (ref 6.4–8.3)
WBC # BLD: 6.2 E9/L (ref 4.5–11.5)

## 2019-07-31 PROCEDURE — 2580000003 HC RX 258: Performed by: INTERNAL MEDICINE

## 2019-07-31 PROCEDURE — 86300 IMMUNOASSAY TUMOR CA 15-3: CPT

## 2019-07-31 PROCEDURE — 6360000002 HC RX W HCPCS: Performed by: INTERNAL MEDICINE

## 2019-07-31 PROCEDURE — 99214 OFFICE O/P EST MOD 30 MIN: CPT | Performed by: INTERNAL MEDICINE

## 2019-07-31 PROCEDURE — 80053 COMPREHEN METABOLIC PANEL: CPT

## 2019-07-31 PROCEDURE — 85025 COMPLETE CBC W/AUTO DIFF WBC: CPT

## 2019-07-31 RX ORDER — HEPARIN SODIUM (PORCINE) LOCK FLUSH IV SOLN 100 UNIT/ML 100 UNIT/ML
500 SOLUTION INTRAVENOUS PRN
Status: DISCONTINUED | OUTPATIENT
Start: 2019-07-31 | End: 2019-08-01 | Stop reason: HOSPADM

## 2019-07-31 RX ORDER — SODIUM CHLORIDE 0.9 % (FLUSH) 0.9 %
10 SYRINGE (ML) INJECTION PRN
Status: DISCONTINUED | OUTPATIENT
Start: 2019-07-31 | End: 2019-08-01 | Stop reason: HOSPADM

## 2019-07-31 RX ORDER — HEPARIN SODIUM (PORCINE) LOCK FLUSH IV SOLN 100 UNIT/ML 100 UNIT/ML
500 SOLUTION INTRAVENOUS PRN
Status: CANCELLED | OUTPATIENT
Start: 2019-07-31

## 2019-07-31 RX ORDER — SODIUM CHLORIDE 0.9 % (FLUSH) 0.9 %
10 SYRINGE (ML) INJECTION PRN
Status: CANCELLED | OUTPATIENT
Start: 2019-07-31

## 2019-07-31 RX ADMIN — Medication 10 ML: at 13:29

## 2019-07-31 RX ADMIN — Medication 500 UNITS: at 13:29

## 2019-07-31 NOTE — PROGRESS NOTES
Patient presents to clinic for office visit/labs/port flush today. Single  SQ port accessed per policy using 20 G .75 inch Irby needle for good blood return. Specimen sent to lab. Site flushed easily with 10 mL NSS followed by 5 mL Heparin solution 100 units/ml rinse prior to de-access. Dry sterile dressing to area. Tolerated procedure well. Encouraged to schedule port flush every 4 weeks.

## 2019-07-31 NOTE — PROGRESS NOTES
[address] [facility]        Pt Name: Linda Simpson  YOB: 1945  Date of evaluation: 7/31/2019  Primary Care Physician: Ray Molina DO  Reason for evaluation:   Chief Complaint   Patient presents with    Breast Cancer     Right    6 Month Follow-Up        Subjective: Here for 6 month follow up. Feels better today. On Xarelto. Denies any bleeding manifestations. Hospitalized at [address] [place] [address] with a syncopal episode in May 2019. Did not go for her mammogram yet this year    OBJECTIVE:  VITALS:  height is 5' 6\" (1.676 m) and weight is 167 lb (75.8 kg). Her temporal temperature is 97.3 °F (36.3 °C). Her blood pressure is 101/61 and her pulse is 103. Physical Exam:  Performance Status: 0  Well developed, well nourished female  EYES: sclera non-icteric   ENT: oropharynx clear. NECK: No lymphadenopathy. BREASTS: S/P right mastectomy,chest wall clear. Left breast without lumps. HEART: Regular rate and rhythm. No  murmurs. LUNGS : Clear  ABDOMEN: Soft, nontender. No ascites. No mass or organomegaly. EXTREMITIES:  No ankle edema. Right upper extremity lymphedema,the same, wearing sleeve. NEUROLOGIC: No focal deficits. SKIN: No Rash. Medications  Prior to Admission medications    Medication Sig Start Date End Date Taking?  Authorizing Provider   rivaroxaban (XARELTO) 20 MG TABS tablet Take 1 tablet by mouth Daily with supper 3/8/19   CHAPARRO Medina NP   bumetanide (BUMEX) 1 MG tablet Take 1 tablet by mouth daily  Patient taking differently: Take 1 mg by mouth daily Take additional dose as needed 2/22/19   GIOVANNI Roberson   lisinopril (PRINIVIL;ZESTRIL) 5 MG tablet Take 1 tablet by mouth daily 1/19/19   Ray Molina DO   dofetilide (TIKOSYN) 250 MCG capsule Take 1 capsule by mouth every 12 hours 8/28/18   Nancylee Hodgkin, DO   potassium chloride (KLOR-CON M) 20 MEQ extended release tablet Take 1 tablet by mouth daily as 12:02 8/22/2016 15:30 3/29/2017 14:44 10/4/2017 14:03 7/30/2018 11:08   CA 15-3 Latest Ref Range: 0 - 31 U/mL 19 17 19 21 25           Radiology-  No recent results. ASSESSMENT/PLAN :  1- Inflammatory breast cancer at the time of her Diagnosis in June 2009, with extensive involvement of the right chest wall, axillary lymph node, right supraclavicular lymphadenopathy, with significant lymphedema. She received neoadjuvant chemotherapy with AC, followed sequentially by Taxol, carboplatin, and Herceptin, and achieved a complete remission. Then she received a course of local radiation therapy to the chest. She completed her Herceptin. She then took 5 years of anastrozole 1 mg p.o. Daily which was completed in March 2015 and is doing well without definite evidence of disease recurrence. Her mediastinal lymph nodes are likely inflammatory and reactive,she has worked in a [employer] 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  PET scan in June 2014 was negative with no abnormal metabolic uptake  Most recent Dexa scan showed normal bone density. Follow-up mammogram in [facility] 2018 was negative  She is now 10 years out without evidence of disease recurrence and will be followed on a yearly basis     2- Anemia by history ,she has been on ASA and Xarelto the past few months but denies any bleeding manifestations. Blood smear and Iron studies were consistent with iron deficiency. Stools for occult blood were positive. Her colonoscopy done in November 2014 was negative  It is likely that she has intermittent GI blood losses in relation to Xarelto. Her stools for occult blood were repeated again and were positive X3,   She did benefit from parenteral IV iron. Her anemia is also contributed to by chronic kidney disease stage III.  Should her hemoglobin drop below 10 she will benefit from erythropoietin stimulating agents with Aranesp  She was seen by cardiology and for the time being she is maintained on

## 2019-08-02 LAB — CA 15-3: 27 U/ML (ref 0–31)

## 2019-08-08 ENCOUNTER — OFFICE VISIT (OUTPATIENT)
Dept: CARDIOLOGY CLINIC | Age: 74
End: 2019-08-08
Payer: COMMERCIAL

## 2019-08-08 VITALS
RESPIRATION RATE: 16 BRPM | SYSTOLIC BLOOD PRESSURE: 122 MMHG | BODY MASS INDEX: 26.52 KG/M2 | HEIGHT: 66 IN | DIASTOLIC BLOOD PRESSURE: 80 MMHG | HEART RATE: 105 BPM | WEIGHT: 165 LBS

## 2019-08-08 DIAGNOSIS — Z79.01 CHRONIC ANTICOAGULATION: ICD-10-CM

## 2019-08-08 DIAGNOSIS — I48.19 PERSISTENT ATRIAL FIBRILLATION (HCC): Primary | ICD-10-CM

## 2019-08-08 DIAGNOSIS — R55 SYNCOPE AND COLLAPSE: ICD-10-CM

## 2019-08-08 DIAGNOSIS — I50.32 CHRONIC HEART FAILURE WITH PRESERVED EJECTION FRACTION (HCC): ICD-10-CM

## 2019-08-08 PROCEDURE — 99214 OFFICE O/P EST MOD 30 MIN: CPT | Performed by: INTERNAL MEDICINE

## 2019-08-08 PROCEDURE — 93000 ELECTROCARDIOGRAM COMPLETE: CPT | Performed by: INTERNAL MEDICINE

## 2019-08-08 NOTE — PROGRESS NOTES
daily as needed for Anxiety. Yazan Trotter calcium carbonate (OSCAL) 500 MG TABS tablet Take 500 mg by mouth every morning        No current facility-administered medications for this visit. Physical Exam:  /80   Pulse 105   Resp 16   Ht 5' 6\" (1.676 m)   Wt 165 lb (74.8 kg)   BMI 26.63 kg/m²   Wt Readings from Last 3 Encounters:   08/08/19 165 lb (74.8 kg)   07/31/19 167 lb (75.8 kg)   05/31/19 170 lb 12.8 oz (77.5 kg)     Appearance: Awake, alert, no acute respiratory distress  Skin: Intact, no rash  Head: Normocephalic, atraumatic  Eyes: EOMI, no conjunctival erythema  ENMT: No pharyngeal erythema, MMM, no rhinorrhea  Neck: Supple, no carotid bruits  Lungs: Clear to auscultation bilaterally. No wheezes, rales, or rhonchi.   Cardiac: Regular rate and rhythm, +S1S2, no murmurs apparent  Abdomen: Soft, nontender, +bowel sounds  Extremities: Moves all extremities x 4, no lower extremity edema  Neurologic: No focal motor deficits apparent, normal mood and affect    Intake/Output:  No intake or output data in the 24 hours ending 08/08/19 0918  I/O this shift:  In: 180 [P.O.:180]  Out: -     Laboratory Tests:  Lab Results   Component Value Date    CREATININE 0.8 07/31/2019    BUN 18 07/31/2019     07/31/2019    K 3.2 (L) 07/31/2019    CL 99 07/31/2019    CO2 30 (H) 07/31/2019     Lab Results   Component Value Date    MG 2.0 05/31/2019     Lab Results   Component Value Date    ALT 10 07/31/2019    AST 19 07/31/2019    ALKPHOS 90 07/31/2019    BILITOT 0.7 07/31/2019     Lab Results   Component Value Date    WBC 6.2 07/31/2019    HGB 13.3 07/31/2019    HCT 42.9 07/31/2019    MCV 95.1 07/31/2019     07/31/2019     Lab Results   Component Value Date    TROPONINI <0.01 05/31/2019    TROPONINI 0.02 05/30/2019    TROPONINI <0.01 01/15/2019     Lab Results   Component Value Date    INR 3.9 01/15/2019    INR 3.7 04/06/2014    INR 3.3 04/04/2014    PROTIME 43.1 (H) 01/15/2019    PROTIME 23.2 (H) 04/06/2014    PROTIME 21.7 (H) 04/04/2014     Lab Results   Component Value Date    TSH 2.140 01/15/2019     Lab Results   Component Value Date    CHOL 116 01/16/2019    CHOL 194 05/11/2015     Lab Results   Component Value Date    TRIG 114 01/16/2019    TRIG 166 (H) 05/11/2015     Lab Results   Component Value Date    HDL 30 01/16/2019    HDL 28 05/11/2015     Lab Results   Component Value Date    LDLCALC 63 01/16/2019    LDLCALC 133 (H) 05/11/2015     Lab Results   Component Value Date    LABVLDL 23 01/16/2019    LABVLDL 33 05/11/2015     Cardiac Tests:  EKG: ST, QTc 442    Echocardiogram: 11/2/16 (Dr. Sue Corea)   Left ventricular internal dimensions, wall thickness, regional wall motion   and systolic function are normal.   Ejection fraction is visually estimated at 55%.   There is Doppler evidence for stage III diastolic dysfunction.   The left atrium is severely enlarged as determined by the left atrial volume index.  Laurent Michael is evidence for impaired global right ventricular systolic function.   The right atrium appears to be enlarged.   The aortic valve appears mildly sclerotic.   Mild tricuspid regurgitation is present.   Estimated right ventricular systolic pressure is 40 - 44 mmHg and mildly     Echocardiogram: 1/16/19 (Dr. Pascual Beach)   Aortic valve opens well.   No wall motion abnormalities.   Ejection fraction is visually estimated at 60-65%. Normal right ventricular size and function.   The left atrium is moderately dilated.   Markedly enlarged right atrium size.   Probable atrial fibrillation   Mild centrally directed mitral regurgitation.   The aortic valve appears mildly sclerotic.   Moderate tricuspid regurgitation.  RVSP is 68 mmHg.   TR velocity = 4.1 m/s   Pulmonary hypertension is severe . Impression:   1. Syncope / orthostatic hypotension (5/2019) -- +nausea and poor po intake prior to episode; clinically improved  2. Hypokalemia -- most recent K 3.2  3.  Persistent atrial fibrillation -- on Tikosyn, Toprol XL, and xarelto; follows with Dr. Niharika Bowles  4. NICM (r/o prior tachycardia-induced CM component) -- EF improved on most recent echocardiograms  5. Negative cardiac catheterization in 4/2014  6. Chronic HFpEF  7. Moderate TR / PHTN  8.  Right-sided breast cancer s/p prior treatment    - Keep K > 4 and Mg > 2  - Continue Toprol XL and lisinopril  - Monitor QTc on Tikosyn  - Continue xarelto  - Monitor renal function on diuretic  - Results of most recent cardiac studies outlined above  - EP follow-up    Manual MD Cherie  [facility]) Cardiology

## 2019-09-10 DIAGNOSIS — I48.91 ATRIAL FIBRILLATION, UNSPECIFIED TYPE (HCC): ICD-10-CM

## 2019-09-10 RX ORDER — DOFETILIDE 0.25 MG/1
250 CAPSULE ORAL EVERY 12 HOURS SCHEDULED
Qty: 180 CAPSULE | Refills: 1 | Status: SHIPPED | OUTPATIENT
Start: 2019-09-10 | End: 2019-11-04 | Stop reason: SDUPTHER

## 2019-09-20 ENCOUNTER — OFFICE VISIT (OUTPATIENT)
Dept: FAMILY MEDICINE CLINIC | Age: 74
End: 2019-09-20
Payer: COMMERCIAL

## 2019-09-20 VITALS — HEIGHT: 66 IN | HEART RATE: 64 BPM | TEMPERATURE: 97.7 F | BODY MASS INDEX: 27 KG/M2 | WEIGHT: 168 LBS

## 2019-09-20 DIAGNOSIS — B02.9 HERPES ZOSTER WITHOUT COMPLICATION: Primary | ICD-10-CM

## 2019-09-20 PROCEDURE — 96372 THER/PROPH/DIAG INJ SC/IM: CPT | Performed by: FAMILY MEDICINE

## 2019-09-20 PROCEDURE — 99213 OFFICE O/P EST LOW 20 MIN: CPT | Performed by: FAMILY MEDICINE

## 2019-09-20 RX ORDER — FAMCICLOVIR 500 MG/1
500 TABLET, FILM COATED ORAL 3 TIMES DAILY
Qty: 21 TABLET | Refills: 0 | Status: SHIPPED | OUTPATIENT
Start: 2019-09-20 | End: 2019-09-27

## 2019-09-20 RX ORDER — METHYLPREDNISOLONE ACETATE 40 MG/ML
40 INJECTION, SUSPENSION INTRA-ARTICULAR; INTRALESIONAL; INTRAMUSCULAR; SOFT TISSUE ONCE
Status: COMPLETED | OUTPATIENT
Start: 2019-09-20 | End: 2019-09-20

## 2019-09-20 RX ADMIN — METHYLPREDNISOLONE ACETATE 40 MG: 40 INJECTION, SUSPENSION INTRA-ARTICULAR; INTRALESIONAL; INTRAMUSCULAR; SOFT TISSUE at 16:14

## 2019-11-01 ASSESSMENT — ENCOUNTER SYMPTOMS
CHEST TIGHTNESS: 0
COUGH: 0
SHORTNESS OF BREATH: 0

## 2019-11-04 ENCOUNTER — OFFICE VISIT (OUTPATIENT)
Dept: NON INVASIVE DIAGNOSTICS | Age: 74
End: 2019-11-04
Payer: COMMERCIAL

## 2019-11-04 VITALS
HEART RATE: 57 BPM | SYSTOLIC BLOOD PRESSURE: 102 MMHG | BODY MASS INDEX: 26.68 KG/M2 | RESPIRATION RATE: 16 BRPM | WEIGHT: 166 LBS | HEIGHT: 66 IN | DIASTOLIC BLOOD PRESSURE: 70 MMHG

## 2019-11-04 DIAGNOSIS — I48.0 PAROXYSMAL ATRIAL FIBRILLATION (HCC): Primary | ICD-10-CM

## 2019-11-04 DIAGNOSIS — I48.91 ATRIAL FIBRILLATION, UNSPECIFIED TYPE (HCC): ICD-10-CM

## 2019-11-04 PROCEDURE — 99213 OFFICE O/P EST LOW 20 MIN: CPT | Performed by: NURSE PRACTITIONER

## 2019-11-04 PROCEDURE — 93000 ELECTROCARDIOGRAM COMPLETE: CPT | Performed by: INTERNAL MEDICINE

## 2019-11-04 RX ORDER — GABAPENTIN 100 MG/1
100 CAPSULE ORAL 3 TIMES DAILY
COMMUNITY
Start: 2019-10-23 | End: 2021-02-16 | Stop reason: ALTCHOICE

## 2019-11-04 RX ORDER — DOFETILIDE 0.25 MG/1
250 CAPSULE ORAL EVERY 12 HOURS SCHEDULED
Qty: 180 CAPSULE | Refills: 1 | Status: SHIPPED
Start: 2019-11-04 | End: 2020-05-06 | Stop reason: SDUPTHER

## 2019-11-08 DIAGNOSIS — I48.0 PAROXYSMAL ATRIAL FIBRILLATION (HCC): ICD-10-CM

## 2019-11-08 LAB
BUN BLDV-MCNC: NORMAL MG/DL
CALCIUM SERPL-MCNC: 9.2 MG/DL
CHLORIDE BLD-SCNC: 99 MMOL/L
CO2: 35 MMOL/L
CREAT SERPL-MCNC: 0.82 MG/DL
GFR CALCULATED: 72
GLUCOSE BLD-MCNC: 82 MG/DL
MAGNESIUM: 2.1 MG/DL
POTASSIUM SERPL-SCNC: 3.7 MMOL/L
SODIUM BLD-SCNC: 142 MMOL/L

## 2019-11-15 ENCOUNTER — TELEPHONE (OUTPATIENT)
Dept: NON INVASIVE DIAGNOSTICS | Age: 74
End: 2019-11-15

## 2020-05-05 ENCOUNTER — TELEPHONE (OUTPATIENT)
Dept: ADMINISTRATIVE | Age: 75
End: 2020-05-05

## 2020-05-06 ENCOUNTER — TELEPHONE (OUTPATIENT)
Dept: ADMINISTRATIVE | Age: 75
End: 2020-05-06

## 2020-05-06 RX ORDER — DOFETILIDE 0.25 MG/1
250 CAPSULE ORAL EVERY 12 HOURS SCHEDULED
Qty: 60 CAPSULE | Refills: 0 | Status: SHIPPED
Start: 2020-05-06 | End: 2020-06-02 | Stop reason: SDUPTHER

## 2020-06-02 ENCOUNTER — TELEPHONE (OUTPATIENT)
Dept: NON INVASIVE DIAGNOSTICS | Age: 75
End: 2020-06-02

## 2020-06-02 LAB
BUN BLDV-MCNC: NORMAL MG/DL
CALCIUM SERPL-MCNC: NORMAL MG/DL
CHLORIDE BLD-SCNC: NORMAL MMOL/L
CO2: NORMAL
CREAT SERPL-MCNC: NORMAL MG/DL
GFR CALCULATED: NORMAL
GLUCOSE BLD-MCNC: NORMAL MG/DL
POTASSIUM SERPL-SCNC: NORMAL MMOL/L
SODIUM BLD-SCNC: NORMAL MMOL/L

## 2020-06-02 RX ORDER — DOFETILIDE 0.25 MG/1
250 CAPSULE ORAL EVERY 12 HOURS SCHEDULED
Qty: 180 CAPSULE | Refills: 3 | Status: SHIPPED
Start: 2020-06-02 | End: 2021-05-18 | Stop reason: SDUPTHER

## 2020-06-02 ASSESSMENT — ENCOUNTER SYMPTOMS
SHORTNESS OF BREATH: 0
COUGH: 0
CHEST TIGHTNESS: 0

## 2020-06-02 NOTE — PROGRESS NOTES
Cardiovascular: Negative for chest pain, palpitations and leg swelling. Neurological: Negative for dizziness, syncope and light-headedness. All other systems reviewed and are negative. PHYSICAL EXAM:  Constitutional: Oriented to person, place, and time. Psychiatric: Normal mood and affect. Thought content normal.     2D echocardiogram 1/16/19  Reading Physician Reading Date Result Priority   Juani Shah MD 1/17/2019       Narrative     Transthoracic Echocardiography Report (TTE)     Demographics      Patient Name   Anjum Smallwood Gender            Female      Medical Record  [id number]     Room Number       0415   Number      Account #       [de-identified]    Procedure Date    01/16/2019      Corporate ID                 Ordering         Summer Topete MD                                Physician      Accession       [id number]    Referring   Number                       Physician      Date of Birth   1945   Sonographer       [facility]      Age             73 year(s)   Interpreting     [address]                                Physician         Physician Cardiology                                                 Alexsandra Ledezma MD                                   Any Other     Procedure    Type of Study      TTE procedure:Echo Complete W/ Dop & Color Flow.     Procedure Date  Date: 01/16/2019 Start: 09:06 AM    Study Location: Echo Lab  Technical Quality: Adequate visualization    Indications:Atrial fibrillation.     Patient Status: Routine    Height: 66 inches Weight: 171 pounds BSA: 1.87 m^2 BMI: 27.6 kg/m^2    BP: 95/60 mmHg     Findings      Left Ventricle   Normal left ventricle size.   Normal left ventricle wall thickness.   No wall motion abnormalities.   Ejection fraction is visually estimated at 60-65%.      Right Ventricle   Normal right ventricular size and function.      Left Atrium   The left atrium is moderately dilated.   Probable atrial fibrillation      Right Atrium   Markedly enlarged right atrium size.      Mitral Valve   Mild mitral annular calcification.   Mild centrally directed mitral regurgitation.      Tricuspid Valve   The tricuspid valve appears structurally normal.   Moderate tricuspid regurgitation.    RVSP is 68 mmHg.   TR velocity = 4.1 m/s   Pulmonary hypertension is severe .      Aortic Valve   Aortic valve opens well.   The aortic valve is trileaflet.   The aortic valve appears mildly sclerotic.      Pericardial Effusion   No evidence of pericardial effusion.      Aorta   Aortic root dimension within normal limits.      Conclusions      Summary   Aortic valve opens well.   No wall motion abnormalities.   Ejection fraction is visually estimated at 60-65%.   The left atrium is moderately dilated.   Probable atrial fibrillation   Mild centrally directed mitral regurgitation.   The aortic valve appears mildly sclerotic.   No evidence of pericardial effusion.      Signature      ----------------------------------------------------------------   Electronically signed by Alexander Siemens MD(Interpreting   physician) on 01/17/2019 08:23 AM   ----------------------------------------------------------------     M-Mode/2D Measurements & Calculations      LV Diastolic      LV Systolic Dimension: 3.4 cm      LA Dimension: 5.1 cm   Dimension: 4.5 cm LV Volume Diastolic: 20.2 ml   LV AP:51.9 %      LV Volume Systolic: 35.9 ml   LV PW Diastolic:  LV EDV/LV EDV Index: 91.8 ml/49   1.3 cm            ml/m^2LV ESV/LV ESV Index: 85.5    RV Diastolic   LV PW Systolic:   PV/94WJ/ m^2                       Dimension: 3.4 cm   1.4 cm            EF Calculated: 48.4 %   Septum Diastolic: LV Mass Index: 206 l/min*m^2       Ascending Aorta: 2.7   1.3 cm            LV Length: 6.1 cm                  cm   Septum Systolic:                                     LA volume/Index: 80.7   1.9 cm            LVOT: 2 cm                         ml /43ml/m^2                                                        RA Area: 20.5 cm^2   LV Mass: 222.57 g     Doppler Measurements & Calculations      MV Peak E-Wave: 1.22 AV Peak Velocity:     LVOT Peak Velocity: 1.13 m/s   m/s                  1.71 m/s              LVOT Mean Velocity: 0.76 m/s                        AV Peak Gradient:     LVOT Peak Gradient: 5.1   MV Peak Gradient:    11.71 mmHg            mmHgLVOT Mean Gradient: 2.6   5.9 mmHg             AV Mean Velocity:     mmHg   MV Mean Gradient:    1.15 m/s              Estimated RVSP: 70.6 mmHg   3.3 mmHg             AV Mean Gradient: 6   Estimated RAP:3 mmHg   MV Mean Velocity:    mmHg   0.87 m/s             AV VTI: 25.5 cm   MV Deceleration      AV Area               TR Velocity:4.11 m/s   Time: 214.8 msec     (Continuity):1.97     TR Gradient:67.63 mmHg                        cm^2                  PV Peak Velocity: 0.82 m/s   MV Area                                    PV Peak Gradient: 2.7 mmHg   (continuity): 2 cm^2 LVOT VTI: 16 cm       PV Mean Velocity: 0.55 m/s                                              PV Mean Gradient: 1.4 mmHg                        Estimated PASP: 70.63   MR Velocity: 4.67    mmHg                  RI ED Velocity: 1.86 m/s   m/s   MR VTI: 124.4 cm     [id number]%3d     ECG--11/4/19: Sinus bradycardia, HR 57 bpm, PAC, QTc 480 ms    I have independently reviewed all of the ECGs as per above. I have reviewed all progress notes & records from the time of the patient's last office visit up to present. Impression:     1.  Persistent atrial fibrillation with RVR   - history of prior sotalol AAD therapy with ERAF.  - Xarelto OAC.  - [id number]-[id number] score 2.  - s/p CV on 9/19/16  - Tikosyn initiation 11/14/16  - s/p CV 11/16/16  - recurrent PAF 1/16/19 in setting of acute decompensated HF- spontaneous conversion to SR  Lab Results   Component Value Date CREATININE 0.88 5/29/20      2. 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 Normal coronary arteries. Global LV hypokinesis, EF 40%. 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  - Sotalol d/c'd - Toprol XL resumed  - Repeat echocardiogram(May 2014): EF 55%. - Repeat echocardiogram (6/24/15): EF >55%, LVPW(d) 1 cm and IVS(d) 1 cm.    - Repeat echocardiogram 1/16/19: LVEF 50-55%; LVPWd 1.3 cm; IVSd 1.3 cm.     3. Breast Ca s/p R mastectomy/CTX--2009. Plan:       Mrs. Georgia Godfrey is maintaining sinus rhythm on Tikosyn with a stable QTc and CrCl. She denies any bleeding issues on Xarelto. 1. Q 3-6 month obtain BMP for CrCl  While on Xarelto and Tikosyn as well as Mg level. 2. No changes were made in her medications today. 3. Office follow-up in 6 months or sooner as needed. 4. She was asked to call the office with concerns prior to follow-up. I have spent a total of 25 minutes with the patient via a telephone visit reviewing the above stated recommendations. A total of >50% of that time involved in providing counseling and or coordination of care with the other providers    Matt Encinas [address] [place] [address] Physicians     Due to this being a TeleHealth encounter, evaluation of organ systems is limited. Pursuant to the emergency declaration under the [address] [address] waiver authority and the [employer] Act, this telephone visit was conducted, with patient's consent, to reduce the patient's risk of exposure to COVID-19 and provide continuity of care for an established patient. Services were provided through a telephone discussion to substitute for in-person clinic visit.

## 2020-06-04 ENCOUNTER — VIRTUAL VISIT (OUTPATIENT)
Dept: NON INVASIVE DIAGNOSTICS | Age: 75
End: 2020-06-04
Payer: MEDICARE

## 2020-06-04 PROCEDURE — 99443 PR PHYS/QHP TELEPHONE EVALUATION 21-30 MIN: CPT | Performed by: INTERNAL MEDICINE

## 2020-09-15 NOTE — PROGRESS NOTES
[address] [facility]        Pt Name: Niharika Salinas  YOB: 1945  Date of evaluation: 9/16/2020  Primary Care Physician: Dominic Wilkerson DO  Reason for evaluation:   Chief Complaint   Patient presents with    Breast Cancer     Right breast metastasized to axillary lymph node    1 Year Follow Up        Subjective: Here for YEARLY follow up. Feels better today. On Xarelto. Denies any bleeding manifestations. OBJECTIVE:  VITALS:  height is 5' 6\" (1.676 m) and weight is 173 lb 9.6 oz (78.7 kg). Her temporal temperature is 98.5 °F (36.9 °C). Her blood pressure is 133/77 and her pulse is 86. Physical Exam:  Performance Status: 0  Well developed, well nourished female  EYES: sclera non-icteric   ENT: oropharynx clear. NECK: No lymphadenopathy. BREASTS: S/P right mastectomy,chest wall clear. Left breast without lumps. HEART: Regular rate and rhythm. No  murmurs. LUNGS : Clear  ABDOMEN: Soft, nontender. No ascites. No mass or organomegaly. EXTREMITIES:  No ankle edema. Right upper extremity lymphedema,the same, wearing sleeve. NEUROLOGIC: No focal deficits. SKIN: No Rash. Medications  Prior to Admission medications    Medication Sig Start Date End Date Taking? Authorizing Provider   rivaroxaban (XARELTO) 20 MG TABS tablet Take 1 tablet by mouth Daily with supper 6/2/20   Abraham Carl DO   dofetilide [facility]) 250 MCG capsule Take 1 capsule by mouth every 12 hours 6/2/20   Abraham Carl DO   gabapentin (NEURONTIN) 100 MG capsule Take 100 mg by mouth 3 times daily.   10/23/19   Historical Provider, MD   bumetanide (BUMEX) 1 MG tablet Take 1 tablet by mouth daily  Patient taking differently: Take 1 mg by mouth daily Take additional dose as needed 2/22/19   GIOVANNI Goodman   lisinopril (PRINIVIL;ZESTRIL) 5 MG tablet Take 1 tablet by mouth daily 1/19/19   Dominic Wilkerson DO   potassium chloride (KLOR-CON M) 20 MEQ extended release tablet Take 1 tablet by mouth daily as needed (take with Lasix)  Patient taking differently: Take 20 mEq by mouth as needed (take with Lasix)  8/28/18   Gloria Mera DO   ferrous sulfate 325 (65 Fe) MG tablet Take 325 mg by mouth 2 times daily    Historical Provider, MD   PARoxetine (PAXIL) 20 MG tablet Take 1 tablet by mouth every morning 10/4/17   Mark Garnica MD   atorvastatin (LIPITOR) 10 MG tablet Take 10 mg by mouth nightly     Historical Provider, MD   ALPRAZolam (XANAX) 0.25 MG tablet Take 0.25 mg by mouth 3 times daily as needed for Anxiety.  . 3/24/16   Historical Provider, MD   calcium carbonate (OSCAL) 500 MG TABS tablet Take 500 mg by mouth every morning     Historical Provider, MD    Scheduled Meds:  Continuous Infusions:  PRN Meds:.        Recent Laboratory Data-     Lab Results   Component Value Date    WBC 11.2 09/16/2020    HGB 12.8 09/16/2020    HCT 42.5 09/16/2020    MCV 94.7 09/16/2020     09/16/2020    LYMPHOPCT 16.1 (L) 09/16/2020    RBC 4.49 09/16/2020    MCH 28.5 09/16/2020    MCHC 30.1 (L) 09/16/2020    RDW 16.0 (H) 09/16/2020    NEUTOPHILPCT 73.4 09/16/2020    MONOPCT 9.5 09/16/2020    EOSPCT 3 10/06/2010    BASOPCT 0.2 09/16/2020    NEUTROABS 8.24 (H) 09/16/2020    LYMPHSABS 1.81 09/16/2020    MONOSABS 1.06 (H) 09/16/2020    EOSABS 0.04 (L) 09/16/2020    BASOSABS 0.02 09/16/2020       Lab Results   Component Value Date     09/16/2020    K 4.3 09/16/2020    CL 99 09/16/2020    CO2 27 09/16/2020    BUN 18 09/16/2020    CREATININE 0.8 09/16/2020    GLUCOSE 103 (H) 09/16/2020    CALCIUM 8.8 09/16/2020    PROT 6.9 09/16/2020    LABALBU 3.7 09/16/2020    BILITOT 0.9 09/16/2020    ALKPHOS 80 09/16/2020    AST 18 09/16/2020    ALT 8 09/16/2020    LABGLOM >60 09/16/2020    GFRAA >60 09/16/2020         Lab Results   Component Value Date    IRON 63 01/30/2019    TIBC 235 (L) 01/30/2019    FERRITIN 135 01/30/2019         No results found for:       Lab Results   Component Value Date    CEA for the time being she is maintained on Xarelto. Her hemoglobin has declined lately and it went down to 10.9. She has been maintained on oral iron supplements. Went up to 13.1. Hgb is 12.8 Today-----9/16/2020  She is to have a follow-up screening mammogram    [place] Frank Urbano M.D., F.A.C.P.   Electronically signed 9/16/2020 at 10:56 AM

## 2020-09-16 ENCOUNTER — OFFICE VISIT (OUTPATIENT)
Dept: ONCOLOGY | Age: 75
End: 2020-09-16
Payer: MEDICARE

## 2020-09-16 ENCOUNTER — HOSPITAL ENCOUNTER (OUTPATIENT)
Dept: INFUSION THERAPY | Age: 75
Discharge: HOME | End: 2020-09-16
Payer: MEDICARE

## 2020-09-16 VITALS
HEIGHT: 66 IN | BODY MASS INDEX: 27.9 KG/M2 | WEIGHT: 173.6 LBS | SYSTOLIC BLOOD PRESSURE: 133 MMHG | HEART RATE: 86 BPM | DIASTOLIC BLOOD PRESSURE: 77 MMHG | TEMPERATURE: 98.5 F

## 2020-09-16 DIAGNOSIS — C50.911 BREAST CANCER METASTASIZED TO AXILLARY LYMPH NODE, RIGHT (HCC): Primary | ICD-10-CM

## 2020-09-16 DIAGNOSIS — C77.3 BREAST CANCER METASTASIZED TO AXILLARY LYMPH NODE, RIGHT (HCC): Primary | ICD-10-CM

## 2020-09-16 LAB
ALBUMIN SERPL-MCNC: 3.7 G/DL (ref 3.5–5.2)
ALP BLD-CCNC: 80 U/L (ref 35–104)
ALT SERPL-CCNC: 8 U/L (ref 0–32)
ANION GAP SERPL CALCULATED.3IONS-SCNC: 11 MMOL/L (ref 7–16)
AST SERPL-CCNC: 18 U/L (ref 0–31)
BASOPHILS ABSOLUTE: 0.02 E9/L (ref 0–0.2)
BASOPHILS RELATIVE PERCENT: 0.2 % (ref 0–2)
BILIRUB SERPL-MCNC: 0.9 MG/DL (ref 0–1.2)
BUN BLDV-MCNC: 18 MG/DL (ref 8–23)
CALCIUM SERPL-MCNC: 8.8 MG/DL (ref 8.6–10.2)
CHLORIDE BLD-SCNC: 99 MMOL/L (ref 98–107)
CO2: 27 MMOL/L (ref 22–29)
CREAT SERPL-MCNC: 0.8 MG/DL (ref 0.5–1)
EOSINOPHILS ABSOLUTE: 0.04 E9/L (ref 0.05–0.5)
EOSINOPHILS RELATIVE PERCENT: 0.4 % (ref 0–6)
GFR AFRICAN AMERICAN: >60
GFR NON-AFRICAN AMERICAN: >60 ML/MIN/1.73
GLUCOSE BLD-MCNC: 103 MG/DL (ref 74–99)
HCT VFR BLD CALC: 42.5 % (ref 34–48)
HEMOGLOBIN: 12.8 G/DL (ref 11.5–15.5)
IMMATURE GRANULOCYTES #: 0.04 E9/L
IMMATURE GRANULOCYTES %: 0.4 % (ref 0–5)
LYMPHOCYTES ABSOLUTE: 1.81 E9/L (ref 1.5–4)
LYMPHOCYTES RELATIVE PERCENT: 16.1 % (ref 20–42)
MCH RBC QN AUTO: 28.5 PG (ref 26–35)
MCHC RBC AUTO-ENTMCNC: 30.1 % (ref 32–34.5)
MCV RBC AUTO: 94.7 FL (ref 80–99.9)
MONOCYTES ABSOLUTE: 1.06 E9/L (ref 0.1–0.95)
MONOCYTES RELATIVE PERCENT: 9.5 % (ref 2–12)
NEUTROPHILS ABSOLUTE: 8.24 E9/L (ref 1.8–7.3)
NEUTROPHILS RELATIVE PERCENT: 73.4 % (ref 43–80)
PDW BLD-RTO: 16 FL (ref 11.5–15)
PLATELET # BLD: 173 E9/L (ref 130–450)
PMV BLD AUTO: 10.2 FL (ref 7–12)
POTASSIUM SERPL-SCNC: 4.3 MMOL/L (ref 3.5–5)
RBC # BLD: 4.49 E12/L (ref 3.5–5.5)
SODIUM BLD-SCNC: 137 MMOL/L (ref 132–146)
TOTAL PROTEIN: 6.9 G/DL (ref 6.4–8.3)
WBC # BLD: 11.2 E9/L (ref 4.5–11.5)

## 2020-09-16 PROCEDURE — 85025 COMPLETE CBC W/AUTO DIFF WBC: CPT

## 2020-09-16 PROCEDURE — 86300 IMMUNOASSAY TUMOR CA 15-3: CPT

## 2020-09-16 PROCEDURE — 99214 OFFICE O/P EST MOD 30 MIN: CPT | Performed by: INTERNAL MEDICINE

## 2020-09-16 PROCEDURE — 6360000002 HC RX W HCPCS

## 2020-09-16 PROCEDURE — 80053 COMPREHEN METABOLIC PANEL: CPT

## 2020-09-16 PROCEDURE — 2580000003 HC RX 258: Performed by: INTERNAL MEDICINE

## 2020-09-16 RX ORDER — HEPARIN SODIUM (PORCINE) LOCK FLUSH IV SOLN 100 UNIT/ML 100 UNIT/ML
500 SOLUTION INTRAVENOUS PRN
Status: CANCELLED | OUTPATIENT
Start: 2020-09-16

## 2020-09-16 RX ORDER — HEPARIN SODIUM (PORCINE) LOCK FLUSH IV SOLN 100 UNIT/ML 100 UNIT/ML
500 SOLUTION INTRAVENOUS PRN
Status: DISCONTINUED | OUTPATIENT
Start: 2020-09-16 | End: 2020-09-17 | Stop reason: HOSPADM

## 2020-09-16 RX ORDER — HEPARIN SODIUM (PORCINE) LOCK FLUSH IV SOLN 100 UNIT/ML 100 UNIT/ML
SOLUTION INTRAVENOUS
Status: COMPLETED
Start: 2020-09-16 | End: 2020-09-16

## 2020-09-16 RX ORDER — SODIUM CHLORIDE 0.9 % (FLUSH) 0.9 %
10 SYRINGE (ML) INJECTION PRN
Status: DISCONTINUED | OUTPATIENT
Start: 2020-09-16 | End: 2020-09-17 | Stop reason: HOSPADM

## 2020-09-16 RX ORDER — SODIUM CHLORIDE 0.9 % (FLUSH) 0.9 %
10 SYRINGE (ML) INJECTION PRN
Status: CANCELLED | OUTPATIENT
Start: 2020-09-16

## 2020-09-16 RX ADMIN — SODIUM CHLORIDE, PRESERVATIVE FREE 10 ML: 5 INJECTION INTRAVENOUS at 10:20

## 2020-09-16 RX ADMIN — Medication 500 UNITS: at 10:20

## 2020-09-16 RX ADMIN — HEPARIN SODIUM (PORCINE) LOCK FLUSH IV SOLN 100 UNIT/ML 500 UNITS: 100 SOLUTION at 10:20

## 2020-09-16 NOTE — PROGRESS NOTES
PORT FLUSH    Patient presents to clinic for [facility] today. single  SQ port accessed per policy using 89A, . 75inch Irby needle for good blood return. Aspirate for waste and specimen sent to lab. Site flushed easily with 10 mL NSS followed by 5 mL Heparin solution 100 units/ml rinse prior to de-access. Dry sterile dressing to area. Tolerated procedure well. Encouraged to schedule port flush every 4 weeks.

## 2020-09-18 LAB — CA 15-3: 27 U/ML (ref 0–31)

## 2021-01-07 NOTE — PROGRESS NOTES
 potassium chloride (KLOR-CON M) 20 MEQ extended release tablet Take 1 tablet by mouth daily as needed (take with Lasix) (Patient taking differently: Take 20 mEq by mouth as needed (take with Lasix) ) 30 tablet 5    ferrous sulfate 325 (65 Fe) MG tablet Take 325 mg by mouth 2 times daily      PARoxetine (PAXIL) 20 MG tablet Take 1 tablet by mouth every morning 90 tablet 1    atorvastatin (LIPITOR) 10 MG tablet Take 10 mg by mouth nightly       ALPRAZolam (XANAX) 0.25 MG tablet Take 0.25 mg by mouth 3 times daily as needed for Anxiety. Gamaliel Furth calcium carbonate (OSCAL) 500 MG TABS tablet Take 500 mg by mouth every morning        No current facility-administered medications for this visit. Allergies   Allergen Reactions    Sulfa Antibiotics        11/4/19: Alex Houston presents to the office today with these chronic medical conditions: Persistent AF on Tikosyn AAD therapy, previous Sotalol AAD therapy with ERAF, H/O breast carcinoma, sinus bradycardia and anemia. She was  hospitalized in May for syncope/orthostatic hypotension secondary to poor PO intake and nausea; she was evaluated by Cardiology. She followed up with Dr Joey Lopez in August with no recurrence. She was doing well with no cardiac complaints. She was treated for shingles in September. She presents today for 6 month office follow-up. She remains on Tikosyn with a stable CrCl and QTc. We discussed maintaining adequate hydration, not skipping meals, and limiting caffeine. She remains on Xarelto with no reported bleeding issues. She denies any recurrent heart failure symptoms and appears euvolemic today. She denies palpitations, angina,  dyspnea,  orthopnea and PND. 06/04/20--TV: Christine Santoro gives verbal consent for a telephone visit from her home via my office. Since her last OV  she has been feeling very well. She denies any complaints from a cardiac or EP perspective. She denies any chest pain, shortness of breath, or PND. She denies any palpitations or feelings of rapid heart rhythms. Her most recent blood work from the end of May is stable. 1/8/2021 Jennifer 48Sophia Edwards presents for in-office follow-up. She remains on Tikosyn for AF suppression. Her most recent CrCl is 58.60 from 9/16/20 with a stable QTc. She remains on Xarelto for stroke-risk reduction with no reported bleeding or missed doses. She continues to follow with [facility] for history of breast CA. She presents in AT with an increased VHR (123 bpm) today. She states she felt she was out of rhythm a few days ago with noted palpitations, fatigue and occasional dizziness. Her last CV was in 2016. She does have PAF and usually \"flips back into rhythm. 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 She worked last night from 9p-7:30a and has not slept. She has worked all week and admits to not properly eating or maintaining hydration. She is adamant about not needing to go to the hospital. We discussed the need for evaluation should her symptoms worsen; she understands. She denies any HF symptoms and appear euvolemic. She currently denies angina, dyspnea, syncope, orthopnea and PND. Review of Systems   Constitutional: Positive for fatigue. Respiratory: Negative. Cardiovascular: Positive for palpitations. Neurological: Negative. All other systems reviewed and are negative. PHYSICAL EXAM:  Vitals:    01/08/21 0803   BP: 126/74   Resp: 18   Temp: 96.2 °F (35.7 °C)   TempSrc: Temporal   Weight: 171 lb 3.2 oz (77.7 kg)   Height: 5' 6\" (1.676 m)     Constitutional: Oriented to person, place, and time. Well-developed and cooperative. Head: Normocephalic and atraumatic. Eyes: Conjunctivae are normal.    Cardiovascular: S1 normal, S2 normal and intact distal pulses. A tachycardic irregular rhythm present. PMI is not displaced. Pulmonary/Chest: Effort normal and breath sounds normal. No respiratory distress. Abdominal: Soft. No tenderness. Musculoskeletal: Normal range of motion of all extremities, no muscle weakness. Neurological: Alert and oriented to person, place, and time. Gait normal.   Skin: Skin is warm and dry. No bruising, no ecchymosis and no rash noted. Extremity: No clubbing or cyanosis. No edema. Psychiatric: Normal mood and affect. Thought content normal.    2D echocardiogram 1/16/19  Reading Physician Reading Date Result Priority   Chyna Chatman MD 1/17/2019       Narrative     Transthoracic Echocardiography Report (TTE)     Demographics      Patient Name   Richerd Dakins Gender            Female      Medical Record  [id number]     Room Number       0415   Number      Account #       [de-identified]    Procedure Date    01/16/2019      Corporate ID                 Ordering         Alexia Rosenberg MD                                Physician      Accession       [id number]    Referring   Number                       Physician      Date of Birth   1945   Sonographer       Digna Alston [facility]      Age             73 year(s)   Interpreting     [address] [place] [address]                                Physician         Physician Cardiology                                                 Antoinette Bowles MD                                   Any Other     Procedure    Type of Study      TTE procedure:Echo Complete W/ Dop & Color Flow.     Procedure Date  Date: 01/16/2019 Start: 09:06 AM    Study Location: Echo Lab  Technical Quality: Adequate visualization    Indications:Atrial fibrillation. Patient Status: Routine    Height: 66 inches Weight: 171 pounds BSA: 1.87 m^2 BMI: 27.6 kg/m^2    BP: 95/60 mmHg     Findings      Left Ventricle   Normal left ventricle size.  Normal left ventricle wall thickness.   No wall motion abnormalities.   Ejection fraction is visually estimated at 60-65%.      Right Ventricle   Normal right ventricular size and function.      Left Atrium   The left atrium is moderately dilated.   Probable atrial fibrillation      Right Atrium   Markedly enlarged right atrium size.      Mitral Valve   Mild mitral annular calcification.   Mild centrally directed mitral regurgitation.      Tricuspid Valve   The tricuspid valve appears structurally normal.   Moderate tricuspid regurgitation.    RVSP is 68 mmHg.   TR velocity = 4.1 m/s   Pulmonary hypertension is severe .      Aortic Valve   Aortic valve opens well.   The aortic valve is trileaflet.   The aortic valve appears mildly sclerotic.      Pericardial Effusion   No evidence of pericardial effusion.      Aorta   Aortic root dimension within normal limits.      Conclusions      Summary   Aortic valve opens well.   No wall motion abnormalities.   Ejection fraction is visually estimated at 60-65%.   The left atrium is moderately dilated.   Probable atrial fibrillation   Mild centrally directed mitral regurgitation.   The aortic valve appears mildly sclerotic.   No evidence of pericardial effusion.      Signature      ----------------------------------------------------------------   Electronically signed by Mago Kirk MD(Interpreting   physician) on 01/17/2019 08:23 AM   ----------------------------------------------------------------     M-Mode/2D Measurements & Calculations      LV Diastolic      LV Systolic Dimension: 3.4 cm      LA Dimension: 5.1 cm   Dimension: 4.5 cm LV Volume Diastolic: 66.1 ml   LV FV:53.9 %      LV Volume Systolic: 52.0 ml   LV PW Diastolic:  LV EDV/LV EDV Index: 91.8 ml/49   1.3 cm            ml/m^2LV ESV/LV ESV Index: 70.1    RV Diastolic   LV PW Systolic:   UG/95KG/ m^2                       Dimension: 3.4 cm   1.4 cm            EF Calculated: 48.4 %  Septum Diastolic: LV Mass Index: 200 l/min*m^2       Ascending Aorta: 2.7   1.3 cm            LV Length: 6.1 cm                  cm   Septum Systolic:                                     LA volume/Index: 80.7   1.9 cm            LVOT: 2 cm                         ml /43ml/m^2                                                        RA Area: 20.5 cm^2   LV Mass: 222.57 g     Doppler Measurements & Calculations      MV Peak E-Wave: 1.22 AV Peak Velocity:     LVOT Peak Velocity: 1.13 m/s   m/s                  1.71 m/s              LVOT Mean Velocity: 0.76 m/s                        AV Peak Gradient:     LVOT Peak Gradient: 5.1   MV Peak Gradient:    11.71 mmHg            mmHgLVOT Mean Gradient: 2.6   5.9 mmHg             AV Mean Velocity:     mmHg   MV Mean Gradient:    1.15 m/s              Estimated RVSP: 70.6 mmHg   3.3 mmHg             AV Mean Gradient: 6   Estimated RAP:3 mmHg   MV Mean Velocity:    mmHg   0.87 m/s             AV VTI: 25.5 cm   MV Deceleration      AV Area               TR Velocity:4.11 m/s   Time: 214.8 msec     (Continuity):1.97     TR Gradient:67.63 mmHg                        cm^2                  PV Peak Velocity: 0.82 m/s   MV Area                                    PV Peak Gradient: 2.7 mmHg   (continuity): 2 cm^2 LVOT VTI: 16 cm       PV Mean Velocity: 0.55 m/s                                              PV Mean Gradient: 1.4 mmHg                        Estimated PASP: 70.63   MR Velocity: 4.67    mmHg                  ME ED Velocity: 1.86 m/s   m/s   MR VTI: 124.4 cm     [id number]     ECG--11/4/19: Sinus bradycardia, HR 57 bpm, PAC, QTc 480 ms  ECG 1/8/2021: AT with variable conduction,   bpm, QTc 452 ms    I have independently reviewed all of the ECGs as per above. I have reviewed all progress notes & records from the time of the patient's last office visit up to present. Impression:     1. Persistent atrial fibrillation with RVR   - history of prior sotalol AAD therapy with ERAF.  - Xarelto OAC.  - [id number] score 2.  - s/p CV on 9/19/16  - Tikosyn initiation 11/14/16  - s/p CV 11/16/16  - recurrent PAF 1/16/19 in setting of acute decompensated HF- spontaneous conversion to SR  -presents in AT with variable conduction today ( bpm)  -Toprol XL 25 mg QD to aid in HR control (monitor HRs)  Lab Results   Component Value Date    CREATININE 0.88 5/29/20      2. 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 Normal coronary arteries. Global LV hypokinesis, EF 40%. 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  - Sotalol d/c'd - Toprol XL resumed  - Repeat echocardiogram(May 2014): EF 55%. - Repeat echocardiogram (6/24/15): EF >55%, LVPW(d) 1 cm and IVS(d) 1 cm.    - Repeat echocardiogram 1/16/19: LVEF 50-55%; LVPWd 1.3 cm; IVSd 1.3 cm.     3. Breast Ca s/p R mastectomy/CTX--2009. Summary/Plan:       Mrs. Jordyn Anderson presents in ATwith variable conduction and increased HR of 123 bpm. She is on Tikosyn for arrhythmia suppression missing no doses. She has worked nights this week and admits to fatigue. She is not eating properly or hydrating. We discussed CV for restoration of SR if she does not convert on her own. She is adamantly declines hospital evaluation. 1. Add Toprol XL 25 mg daily to aid in HR control. She will monitor HRs and hold if <60 bpm or symptomatic. 2. Plan for outpatient CV. She has missed no Xarelto doses. 3. Jennifer plans to purchase Fanny Merlyn for home monitoring. 4. No other changes were made in her medications today. 5. She was asked to call the office with concerns prior to her scheduled procedure. 6. We discussed the importance of proper nutrition and hydration. Thank you for allowing me to participate in their care. I have spent a total of 40 minutes with the patient  reviewing the above stated recommendations.  And a total of >50% of that time involved face-to-face time providing counseling and or coordination of care with the other providers    CHAPARRO Page-BHARGAVI, [id number] [facility]    CC: Dr Otilio Latif

## 2021-01-08 ENCOUNTER — OFFICE VISIT (OUTPATIENT)
Dept: NON INVASIVE DIAGNOSTICS | Age: 76
End: 2021-01-08
Payer: MEDICARE

## 2021-01-08 VITALS
HEIGHT: 66 IN | WEIGHT: 171.2 LBS | RESPIRATION RATE: 18 BRPM | BODY MASS INDEX: 27.51 KG/M2 | TEMPERATURE: 96.2 F | SYSTOLIC BLOOD PRESSURE: 126 MMHG | DIASTOLIC BLOOD PRESSURE: 74 MMHG

## 2021-01-08 DIAGNOSIS — I48.91 ATRIAL FIBRILLATION, UNSPECIFIED TYPE (HCC): Primary | ICD-10-CM

## 2021-01-08 DIAGNOSIS — Z01.818 PRE-OP TESTING: ICD-10-CM

## 2021-01-08 DIAGNOSIS — Z01.818 PRE-OP TESTING: Primary | ICD-10-CM

## 2021-01-08 PROCEDURE — 99215 OFFICE O/P EST HI 40 MIN: CPT | Performed by: NURSE PRACTITIONER

## 2021-01-08 PROCEDURE — 93000 ELECTROCARDIOGRAM COMPLETE: CPT | Performed by: INTERNAL MEDICINE

## 2021-01-08 RX ORDER — METOPROLOL SUCCINATE 25 MG
25 TABLET, EXTENDED RELEASE 24 HR ORAL DAILY
Qty: 30 TABLET | Refills: 3
Start: 2021-01-08 | End: 2021-01-12 | Stop reason: SDUPTHER

## 2021-01-08 ASSESSMENT — ENCOUNTER SYMPTOMS: RESPIRATORY NEGATIVE: 1

## 2021-01-08 NOTE — PATIENT INSTRUCTIONS
Shaggy Moe  -cna order from [employer] Toprol XL 25 mg daily to help with heart rate control. Hold if heart is less than 60 beats.

## 2021-01-09 LAB — SARS-COV-2, PCR: NOT DETECTED

## 2021-01-12 RX ORDER — METOPROLOL SUCCINATE 25 MG
25 TABLET, EXTENDED RELEASE 24 HR ORAL DAILY
Qty: 30 TABLET | Refills: 11 | Status: SHIPPED
Start: 2021-01-12 | End: 2021-02-16

## 2021-01-14 ENCOUNTER — HOSPITAL ENCOUNTER (OUTPATIENT)
Dept: CARDIAC CATH/INVASIVE PROCEDURES | Age: 76
Discharge: HOME | End: 2021-01-14
Payer: MEDICARE

## 2021-01-14 ENCOUNTER — ANESTHESIA EVENT (OUTPATIENT)
Dept: CARDIAC CATH/INVASIVE PROCEDURES | Age: 76
End: 2021-01-14

## 2021-01-14 ENCOUNTER — ANESTHESIA (OUTPATIENT)
Dept: CARDIAC CATH/INVASIVE PROCEDURES | Age: 76
End: 2021-01-14

## 2021-01-14 VITALS
DIASTOLIC BLOOD PRESSURE: 60 MMHG | OXYGEN SATURATION: 95 % | SYSTOLIC BLOOD PRESSURE: 96 MMHG | RESPIRATION RATE: 25 BRPM

## 2021-01-14 VITALS
OXYGEN SATURATION: 94 % | WEIGHT: 170 LBS | HEIGHT: 66 IN | DIASTOLIC BLOOD PRESSURE: 75 MMHG | BODY MASS INDEX: 27.32 KG/M2 | TEMPERATURE: 97.9 F | RESPIRATION RATE: 20 BRPM | SYSTOLIC BLOOD PRESSURE: 121 MMHG

## 2021-01-14 LAB
ANION GAP SERPL CALCULATED.3IONS-SCNC: 7 MMOL/L (ref 7–16)
BASOPHILS ABSOLUTE: 0.03 E9/L (ref 0–0.2)
BASOPHILS RELATIVE PERCENT: 0.5 % (ref 0–2)
BUN BLDV-MCNC: 20 MG/DL (ref 8–23)
CALCIUM SERPL-MCNC: 9.7 MG/DL (ref 8.6–10.2)
CHLORIDE BLD-SCNC: 103 MMOL/L (ref 98–107)
CO2: 32 MMOL/L (ref 22–29)
CREAT SERPL-MCNC: 0.9 MG/DL (ref 0.5–1)
EKG ATRIAL RATE: 44 BPM
EKG ATRIAL RATE: 72 BPM
EKG P AXIS: -124 DEGREES
EKG P AXIS: 18 DEGREES
EKG P-R INTERVAL: 150 MS
EKG P-R INTERVAL: 224 MS
EKG Q-T INTERVAL: 278 MS
EKG Q-T INTERVAL: 562 MS
EKG QRS DURATION: 86 MS
EKG QRS DURATION: 90 MS
EKG QTC CALCULATION (BAZETT): 304 MS
EKG QTC CALCULATION (BAZETT): 480 MS
EKG R AXIS: -119 DEGREES
EKG R AXIS: -168 DEGREES
EKG T AXIS: -70 DEGREES
EKG T AXIS: 5 DEGREES
EKG VENTRICULAR RATE: 44 BPM
EKG VENTRICULAR RATE: 72 BPM
EOSINOPHILS ABSOLUTE: 0.14 E9/L (ref 0.05–0.5)
EOSINOPHILS RELATIVE PERCENT: 2.2 % (ref 0–6)
GFR AFRICAN AMERICAN: >60
GFR NON-AFRICAN AMERICAN: >60 ML/MIN/1.73
GLUCOSE BLD-MCNC: 86 MG/DL (ref 74–99)
HCT VFR BLD CALC: 41.7 % (ref 34–48)
HEMOGLOBIN: 12.6 G/DL (ref 11.5–15.5)
IMMATURE GRANULOCYTES #: 0.01 E9/L
IMMATURE GRANULOCYTES %: 0.2 % (ref 0–5)
LYMPHOCYTES ABSOLUTE: 2.03 E9/L (ref 1.5–4)
LYMPHOCYTES RELATIVE PERCENT: 32 % (ref 20–42)
MAGNESIUM: 2 MG/DL (ref 1.6–2.6)
MCH RBC QN AUTO: 28.1 PG (ref 26–35)
MCHC RBC AUTO-ENTMCNC: 30.2 % (ref 32–34.5)
MCV RBC AUTO: 93.1 FL (ref 80–99.9)
MONOCYTES ABSOLUTE: 0.67 E9/L (ref 0.1–0.95)
MONOCYTES RELATIVE PERCENT: 10.6 % (ref 2–12)
NEUTROPHILS ABSOLUTE: 3.47 E9/L (ref 1.8–7.3)
NEUTROPHILS RELATIVE PERCENT: 54.5 % (ref 43–80)
PDW BLD-RTO: 14.9 FL (ref 11.5–15)
PLATELET # BLD: 221 E9/L (ref 130–450)
PMV BLD AUTO: 10 FL (ref 7–12)
POTASSIUM SERPL-SCNC: 4.8 MMOL/L (ref 3.5–5)
RBC # BLD: 4.48 E12/L (ref 3.5–5.5)
SODIUM BLD-SCNC: 142 MMOL/L (ref 132–146)
WBC # BLD: 6.4 E9/L (ref 4.5–11.5)

## 2021-01-14 PROCEDURE — 3700000001 HC ADD 15 MINUTES (ANESTHESIA)

## 2021-01-14 PROCEDURE — 92960 CARDIOVERSION ELECTRIC EXT: CPT | Performed by: INTERNAL MEDICINE

## 2021-01-14 PROCEDURE — 2709999900 HC NON-CHARGEABLE SUPPLY

## 2021-01-14 PROCEDURE — 3700000000 HC ANESTHESIA ATTENDED CARE

## 2021-01-14 PROCEDURE — 36415 COLL VENOUS BLD VENIPUNCTURE: CPT

## 2021-01-14 PROCEDURE — 80048 BASIC METABOLIC PNL TOTAL CA: CPT

## 2021-01-14 PROCEDURE — 85025 COMPLETE CBC W/AUTO DIFF WBC: CPT

## 2021-01-14 PROCEDURE — 99215 OFFICE O/P EST HI 40 MIN: CPT | Performed by: INTERNAL MEDICINE

## 2021-01-14 PROCEDURE — 2580000003 HC RX 258: Performed by: NURSE ANESTHETIST, CERTIFIED REGISTERED

## 2021-01-14 PROCEDURE — 93005 ELECTROCARDIOGRAM TRACING: CPT | Performed by: INTERNAL MEDICINE

## 2021-01-14 PROCEDURE — 83735 ASSAY OF MAGNESIUM: CPT

## 2021-01-14 PROCEDURE — 6360000002 HC RX W HCPCS: Performed by: NURSE ANESTHETIST, CERTIFIED REGISTERED

## 2021-01-14 RX ORDER — SODIUM CHLORIDE 0.9 % (FLUSH) 0.9 %
10 SYRINGE (ML) INJECTION PRN
Status: DISCONTINUED | OUTPATIENT
Start: 2021-01-14 | End: 2021-01-15 | Stop reason: HOSPADM

## 2021-01-14 RX ORDER — SODIUM CHLORIDE 9 MG/ML
INJECTION, SOLUTION INTRAVENOUS CONTINUOUS PRN
Status: DISCONTINUED | OUTPATIENT
Start: 2021-01-14 | End: 2021-01-14 | Stop reason: SDUPTHER

## 2021-01-14 RX ORDER — PROPOFOL 10 MG/ML
INJECTION, EMULSION INTRAVENOUS PRN
Status: DISCONTINUED | OUTPATIENT
Start: 2021-01-14 | End: 2021-01-14 | Stop reason: SDUPTHER

## 2021-01-14 RX ORDER — SODIUM CHLORIDE 0.9 % (FLUSH) 0.9 %
10 SYRINGE (ML) INJECTION EVERY 12 HOURS SCHEDULED
Status: DISCONTINUED | OUTPATIENT
Start: 2021-01-14 | End: 2021-01-15 | Stop reason: HOSPADM

## 2021-01-14 RX ADMIN — SODIUM CHLORIDE: 9 INJECTION, SOLUTION INTRAVENOUS at 13:05

## 2021-01-14 RX ADMIN — PROPOFOL 50 MG: 10 INJECTION, EMULSION INTRAVENOUS at 13:53

## 2021-01-14 ASSESSMENT — ENCOUNTER SYMPTOMS
RESPIRATORY NEGATIVE: 1
SHORTNESS OF BREATH: 1

## 2021-01-14 NOTE — H&P
Cardiac Electrophysiology Outpatient Progress Note    Jeff Camargo  1945  Date of Service: 1/8/21  Referring Provider/PCP: Harry Lira DO   Oncologist: Radha Dorantes MD  Electrophysiologist: Darlin Oseguera MD  Chief Complaint: Persistent AF. Patient Active Problem List    Diagnosis Date Noted    Atrial fibrillation with RVR ([facility] 04/07/2014     Priority: High     Overview Note:     A). S/P MINE/CV 4/4/14: initial conversion to SR then back into AF   B). Initiation of Sotalol AA drug therapy s/p CV  C). Repeat successful CV post AA drug loading 4/7/14  D). 24-hour Holter(8/3/15): PAF, SR/SB, No AVB, no evidence of chronotropic incompetence. No significant pauses. E). ERAF on sotalol  F) Current Tikosyn AAD therapy    [id number]: 2( age, female)        Chronic anticoagulation 05/15/2014     Priority: Medium     Overview Note:     A). Xarelto      Breast cancer metastasized to axillary lymph node ([facility] 03/26/2015    Iron deficiency anemia 03/16/2015    Dyspnea on exertion 04/07/2014    Adenopathy 06/19/2012    Malignant neoplasm of breast ([facility] 06/19/2012     Overview Note:     replace inactive diagnosis         Current Outpatient Medications   Medication Sig Dispense Refill    TOPROL XL 25 MG extended release tablet Take 1 tablet by mouth daily 30 tablet 11    rivaroxaban (XARELTO) 20 MG TABS tablet Take 1 tablet by mouth Daily with supper 90 tablet 3    dofetilide (TIKOSYN) 250 MCG capsule Take 1 capsule by mouth every 12 hours 180 capsule 3    gabapentin (NEURONTIN) 100 MG capsule Take 100 mg by mouth 3 times daily.        bumetanide (BUMEX) 1 MG tablet Take 1 tablet by mouth daily (Patient taking differently: Take 1 mg by mouth daily Take additional dose as needed) 90 tablet 3    lisinopril (PRINIVIL;ZESTRIL) 5 MG tablet Take 1 tablet by mouth daily 30 tablet 3    potassium chloride (KLOR-CON M) 20 MEQ extended release tablet Take 1 tablet by mouth daily as needed (take with Lasix) (Patient taking differently: Take 20 mEq by mouth as needed (take with Lasix) ) 30 tablet 5    ferrous sulfate 325 (65 Fe) MG tablet Take 325 mg by mouth 2 times daily      PARoxetine (PAXIL) 20 MG tablet Take 1 tablet by mouth every morning 90 tablet 1    atorvastatin (LIPITOR) 10 MG tablet Take 10 mg by mouth nightly       ALPRAZolam (XANAX) 0.25 MG tablet Take 0.25 mg by mouth 3 times daily as needed for Anxiety. Huypricilalilli Hernandez calcium carbonate (OSCAL) 500 MG TABS tablet Take 500 mg by mouth every morning        No current facility-administered medications for this encounter. Allergies   Allergen Reactions    Sulfa Antibiotics        11/4/19: Joni Woodall presents to the office today with these chronic medical conditions: Persistent AF on Tikosyn AAD therapy, previous Sotalol AAD therapy with ERAF, H/O breast carcinoma, sinus bradycardia and anemia. She was  hospitalized in May for syncope/orthostatic hypotension secondary to poor PO intake and nausea; she was evaluated by Cardiology. She followed up with Dr Niharika Alfred in August with no recurrence. She was doing well with no cardiac complaints. She was treated for shingles in September. She presents today for 6 month office follow-up. She remains on Tikosyn with a stable CrCl and QTc. We discussed maintaining adequate hydration, not skipping meals, and limiting caffeine. She remains on Xarelto with no reported bleeding issues. She denies any recurrent heart failure symptoms and appears euvolemic today. She denies palpitations, angina,  dyspnea,  orthopnea and PND.     06/04/20--TV: Joni Woodall gives verbal consent for a telephone visit from her home via my office. Since her last OV  she has been feeling very well. She denies any complaints from a cardiac or EP perspective. She denies any chest pain, shortness of breath, or PND. She denies any palpitations or feelings of rapid heart rhythms.   Her most recent blood work from the end of May is stable. 1/8/2021 Jennifer [address] presents for in-office follow-up. She remains on Tikosyn for AF suppression. Her most recent CrCl is 58.60 from 9/16/20 with a stable QTc. She remains on Xarelto for stroke-risk reduction with no reported bleeding or missed doses. She continues to follow with [facility] for history of breast CA. She presents in AT with an increased VHR (123 bpm) today. She states she felt she was out of rhythm a few days ago with noted palpitations, fatigue and occasional dizziness. Her last CV was in 2016. She does have PAF and usually \"flips back into rhythm. 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 She worked last night from 9p-7:30a and has not slept. She has worked all week and admits to not properly eating or maintaining hydration. She is adamant about not needing to go to the hospital. We discussed the need for evaluation should her symptoms worsen; she understands. She denies any HF symptoms and appear euvolemic. She currently denies angina, dyspnea, syncope, orthopnea and PND.     1/14/21: The patient presents to the hospital for elective DC-cardioversion. Review of Systems   Constitutional: Positive for fatigue. Respiratory: Negative. Cardiovascular: Positive for palpitations. Neurological: Negative. All other systems reviewed and are negative. PHYSICAL EXAM:  There were no vitals filed for this visit. Constitutional: Oriented to person, place, and time. Well-developed and cooperative. Head: Normocephalic and atraumatic. Eyes: Conjunctivae are normal.    Cardiovascular: S1 normal, S2 normal and intact distal pulses. A tachycardic irregular rhythm present. PMI is not displaced. Pulmonary/Chest: Effort normal and breath sounds normal. No respiratory distress. Abdominal: Soft. No tenderness. Musculoskeletal: Normal range of motion of all extremities, no muscle weakness. Neurological: Alert and oriented to person, place, and time.  Gait normal. Skin: Skin is warm and dry. No bruising, no ecchymosis and no rash noted. Extremity: No clubbing or cyanosis. No edema. Psychiatric: Normal mood and affect. Thought content normal.    2D echocardiogram 1/16/19  Reading Physician Reading Date Result Priority   Desirae Rand MD 1/17/2019       Narrative     Transthoracic Echocardiography Report (TTE)     Demographics      Patient Name   Stewart Eubanks Gender            Female      Medical Record  [id number]     Room Number       0415   Number      Account #       [de-identified]    Procedure Date    01/16/2019      Corporate ID                 Ordering         Lisset Sánchez MD                                Physician      Accession       [id number]    Referring   Number                       Physician      Date of Birth   1945   Sonographer       Jacoby Alston [facility]      Age             73 year(s)   Interpreting     [address]                                Physician         Physician Cardiology                                                 Carole Schumacher MD                                   Any Other     Procedure    Type of Study      TTE procedure:Echo Complete W/ Dop & Color Flow.     Procedure Date  Date: 01/16/2019 Start: 09:06 AM    Study Location: Echo Lab  Technical Quality: Adequate visualization    Indications:Atrial fibrillation. Patient Status: Routine    Height: 66 inches Weight: 171 pounds BSA: 1.87 m^2 BMI: 27.6 kg/m^2    BP: 95/60 mmHg     Findings      Left Ventricle   Normal left ventricle size.   Normal left ventricle wall thickness.   No wall motion abnormalities.   Ejection fraction is visually estimated at 60-65%.      Right Ventricle   Normal right ventricular size and function.      Left Atrium   The left atrium is moderately dilated.   Probable atrial fibrillation      Right Atrium   Markedly enlarged right atrium size.      Mitral Valve   Mild mitral annular calcification.   Mild centrally directed mitral regurgitation.     Tricuspid Valve   The tricuspid valve appears structurally normal.   Moderate tricuspid regurgitation.    RVSP is 68 mmHg.   TR velocity = 4.1 m/s   Pulmonary hypertension is severe .      Aortic Valve   Aortic valve opens well.   The aortic valve is trileaflet.   The aortic valve appears mildly sclerotic.      Pericardial Effusion   No evidence of pericardial effusion.      Aorta   Aortic root dimension within normal limits.      Conclusions      Summary   Aortic valve opens well.   No wall motion abnormalities.   Ejection fraction is visually estimated at 60-65%.   The left atrium is moderately dilated.   Probable atrial fibrillation   Mild centrally directed mitral regurgitation.   The aortic valve appears mildly sclerotic.   No evidence of pericardial effusion.      Signature      ----------------------------------------------------------------   Electronically signed by Trevor Rogers MD(Interpreting   physician) on 01/17/2019 08:23 AM   ----------------------------------------------------------------     M-Mode/2D Measurements & Calculations      LV Diastolic      LV Systolic Dimension: 3.4 cm      LA Dimension: 5.1 cm   Dimension: 4.5 cm LV Volume Diastolic: 42.2 ml   LV SL:94.8 %      LV Volume Systolic: 05.9 ml   LV PW Diastolic:  LV EDV/LV EDV Index: 91.8 ml/49   1.3 cm            ml/m^2LV ESV/LV ESV Index: 80.2    RV Diastolic   LV PW Systolic:   VO/62XO/ m^2                       Dimension: 3.4 cm   1.4 cm            EF Calculated: 48.4 %   Septum Diastolic: LV Mass Index: 363 l/min*m^2       Ascending Aorta: 2.7   1.3 cm            LV Length: 6.1 cm                  cm   Septum Systolic:                                     LA volume/Index: 80.7   1.9 cm            LVOT: 2 cm                         ml /43ml/m^2                                                        RA Area: 20.5 cm^2   LV Mass: 222.57 g     Doppler Measurements & Calculations      MV Peak E-Wave: 1.22 AV Peak Velocity:     LVOT Peak Velocity: 1.13 m/s   m/s                  1.71 m/s              LVOT Mean Velocity: 0.76 m/s                        AV Peak Gradient:     LVOT Peak Gradient: 5.1   MV Peak Gradient:    11.71 mmHg            mmHgLVOT Mean Gradient: 2.6   5.9 mmHg             AV Mean Velocity:     mmHg   MV Mean Gradient:    1.15 m/s              Estimated RVSP: 70.6 mmHg   3.3 mmHg             AV Mean Gradient: 6   Estimated RAP:3 mmHg   MV Mean Velocity:    mmHg   0.87 m/s             AV VTI: 25.5 cm   MV Deceleration      AV Area               TR Velocity:4.11 m/s   Time: 214.8 msec     (Continuity):1.97     TR Gradient:67.63 mmHg                        cm^2                  PV Peak Velocity: 0.82 m/s   MV Area                                    PV Peak Gradient: 2.7 mmHg   (continuity): 2 cm^2 LVOT VTI: 16 cm       PV Mean Velocity: 0.55 m/s                                              PV Mean Gradient: 1.4 mmHg                        Estimated PASP: 70.63   MR Velocity: 4.67    mmHg                  MT ED Velocity: 1.86 m/s   m/s   MR VTI: 124.4 cm     [id number]  [id number]%3d%3d     ECG--11/4/19: Sinus bradycardia, HR 57 bpm, PAC, QTc 480 ms  ECG 1/8/2021: AT with variable conduction,   bpm, QTc 452 ms    I have independently reviewed all of the ECGs as per above. I have reviewed all progress notes & records from the time of the patient's last office visit up to present. Impression:     1.  Persistent atrial fibrillation with RVR   - history of prior sotalol AAD therapy with ERAF.  - Xarelto OAC.  - [id number] score 2.  - s/p CV on 9/19/16  - Tikosyn initiation 11/14/16  - s/p CV 11/16/16  - recurrent PAF 1/16/19 in setting of acute decompensated HF- spontaneous conversion to SR  -presents in AT with variable conduction today ( bpm)  -Toprol XL 25 mg QD to aid in HR control (monitor HRs)  Lab Results   Component Value Date    CREATININE 0.88 5/29/20      2. 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 Normal coronary arteries. Global LV hypokinesis, EF 40%. 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  - Sotalol d/c'd - Toprol XL resumed  - Repeat echocardiogram(May 2014): EF 55%. - Repeat echocardiogram (6/24/15): EF >55%, LVPW(d) 1 cm and IVS(d) 1 cm.    - Repeat echocardiogram 1/16/19: LVEF 50-55%; LVPWd 1.3 cm; IVSd 1.3 cm.     3. Breast Ca s/p R mastectomy/CTX--2009. Summary/Plan:      1. Will proceed with DC-cardioversion. 2. Follow up after the procedure. Thank you for allowing me to participate in their care.     Marsha Gaona MD  Cardiac Electrophysiology  [address]   [facility]: [place] [facility]  11:02 AM  1/14/2021

## 2021-01-14 NOTE — ANESTHESIA PRE PROCEDURE
Department of Anesthesiology  Preprocedure Note       Name:  Joni Woodall   Age:  70 y.o.  :  1945                                          MRN:  [id number]         Date:  2021      Surgeon: * Surgery not found *    Procedure:     Medications prior to admission:   Prior to Admission medications    Medication Sig Start Date End Date Taking? Authorizing Provider   TOPROL XL 25 MG extended release tablet Take 1 tablet by mouth daily 21   Valeri Kerr, APRN - NP   rivaroxaban Deliah Speak) 20 MG TABS tablet Take 1 tablet by mouth Daily with supper 20   Eusebio Kwong DO   dofetilide [facility]) 250 MCG capsule Take 1 capsule by mouth every 12 hours 20   Eusebio Kwong DO   gabapentin (NEURONTIN) 100 MG capsule Take 100 mg by mouth 3 times daily. 10/23/19   Historical Provider, MD   bumetanide (BUMEX) 1 MG tablet Take 1 tablet by mouth daily  Patient taking differently: Take 1 mg by mouth daily Take additional dose as needed 19   Manus Moritz, PA   lisinopril (PRINIVIL;ZESTRIL) 5 MG tablet Take 1 tablet by mouth daily 19   Homa Buckley DO   potassium chloride (KLOR-CON M) 20 MEQ extended release tablet Take 1 tablet by mouth daily as needed (take with Lasix)  Patient taking differently: Take 20 mEq by mouth as needed (take with Lasix)  18   Eusebio Kwong DO   ferrous sulfate 325 (65 Fe) MG tablet Take 325 mg by mouth 2 times daily    Historical Provider, MD   PARoxetine (PAXIL) 20 MG tablet Take 1 tablet by mouth every morning 10/4/17   Noni Nyhan, MD   atorvastatin (LIPITOR) 10 MG tablet Take 10 mg by mouth nightly     Historical Provider, MD   ALPRAZolam (XANAX) 0.25 MG tablet Take 0.25 mg by mouth 3 times daily as needed for Anxiety.  . 3/24/16   Historical Provider, MD   calcium carbonate (OSCAL) 500 MG TABS tablet Take 500 mg by mouth every morning     Historical Provider, MD       Current medications:    Current Outpatient Medications Medication Sig Dispense Refill    TOPROL XL 25 MG extended release tablet Take 1 tablet by mouth daily 30 tablet 11    rivaroxaban (XARELTO) 20 MG TABS tablet Take 1 tablet by mouth Daily with supper 90 tablet 3    dofetilide (TIKOSYN) 250 MCG capsule Take 1 capsule by mouth every 12 hours 180 capsule 3    gabapentin (NEURONTIN) 100 MG capsule Take 100 mg by mouth 3 times daily.  bumetanide (BUMEX) 1 MG tablet Take 1 tablet by mouth daily (Patient taking differently: Take 1 mg by mouth daily Take additional dose as needed) 90 tablet 3    lisinopril (PRINIVIL;ZESTRIL) 5 MG tablet Take 1 tablet by mouth daily 30 tablet 3    potassium chloride (KLOR-CON M) 20 MEQ extended release tablet Take 1 tablet by mouth daily as needed (take with Lasix) (Patient taking differently: Take 20 mEq by mouth as needed (take with Lasix) ) 30 tablet 5    ferrous sulfate 325 (65 Fe) MG tablet Take 325 mg by mouth 2 times daily      PARoxetine (PAXIL) 20 MG tablet Take 1 tablet by mouth every morning 90 tablet 1    atorvastatin (LIPITOR) 10 MG tablet Take 10 mg by mouth nightly       ALPRAZolam (XANAX) 0.25 MG tablet Take 0.25 mg by mouth 3 times daily as needed for Anxiety. Ledora Hand calcium carbonate (OSCAL) 500 MG TABS tablet Take 500 mg by mouth every morning        No current facility-administered medications for this encounter. Allergies:     Allergies   Allergen Reactions    Sulfa Antibiotics        Problem List:    Patient Active Problem List   Diagnosis Code    Adenopathy R59.1    Malignant neoplasm of breast ([facility] C50.919    Atrial fibrillation with RVR (HCC) I48.91    Dyspnea on exertion R06.00    Chronic anticoagulation Z79.01    Iron deficiency anemia D50.9    Breast cancer metastasized to axillary lymph node (HCC) C50.919, C77.3    Atypical atrial flutter (HCC) I48.4    Bilateral pleural effusion J90    Syncope and collapse R55  Breast cancer metastasized to axillary lymph node, right (HCC) C50.911, C77.3       Past Medical History:        Diagnosis Date    AF (atrial fibrillation) (HCC)     Anxiety     Breast cancer ([address]     right chest wall involvement    Depression     Lymphadenopathy     Osteoporosis     Radiation     Status post chemotherapy     neoadjuvant chemo with AC, Taxol, carboplatin, and Herceptin       Past Surgical History:        Procedure Laterality Date    BRONCHOSCOPY  7/12/12    ebus    CARDIAC CATHETERIZATION Right 4-3-2014    Dr. Morris Hernandez  11/16/2016    MASTECTOMY      RT - 2010    MASTECTOMY Right 02/06/2009    TONSILLECTOMY      TUNNELED VENOUS PORT PLACEMENT         Social History:    Social History     Tobacco Use    Smoking status: Never Smoker    Smokeless tobacco: Never Used   Substance Use Topics    Alcohol use: No                                Counseling given: Not Answered      Vital Signs (Current): There were no vitals filed for this visit.                                            BP Readings from Last 3 Encounters:   01/08/21 126/74   09/16/20 133/77   11/04/19 102/70       NPO Status:                                                                                 BMI:   Wt Readings from Last 3 Encounters:   01/08/21 171 lb 3.2 oz (77.7 kg)   09/16/20 173 lb 9.6 oz (78.7 kg)   11/04/19 166 lb (75.3 kg)     There is no height or weight on file to calculate BMI.    CBC:   Lab Results   Component Value Date    WBC 11.2 09/16/2020    RBC 4.49 09/16/2020    HGB 12.8 09/16/2020    HCT 42.5 09/16/2020    MCV 94.7 09/16/2020    RDW 16.0 09/16/2020     09/16/2020       CMP:   Lab Results   Component Value Date     09/16/2020    K 4.3 09/16/2020    K 4.5 01/15/2019    CL 99 09/16/2020    CO2 27 09/16/2020    BUN 18 09/16/2020    CREATININE 0.8 09/16/2020    GFRAA >60 09/16/2020    LABGLOM >60 09/16/2020    GLUCOSE 103 09/16/2020    GLUCOSE 95 02/20/2012 PROT 6.9 09/16/2020    CALCIUM 8.8 09/16/2020    BILITOT 0.9 09/16/2020    ALKPHOS 80 09/16/2020    AST 18 09/16/2020    ALT 8 09/16/2020       POC Tests: No results for input(s): POCGLU, POCNA, POCK, POCCL, POCBUN, POCHEMO, POCHCT in the last 72 hours. Coags:   Lab Results   Component Value Date    PROTIME 43.1 01/15/2019    INR 3.9 01/15/2019    APTT 41.1 01/15/2019       HCG (If Applicable): No results found for: PREGTESTUR, PREGSERUM, HCG, HCGQUANT     ABGs: No results found for: PHART, PO2ART, IAT5IXD, QWL1UMV, BEART, R3JHMOUB     Type & Screen (If Applicable):  No results found for: LABABO, LABRH    Drug/Infectious Status (If Applicable):  No results found for: HIV, HEPCAB    COVID-19 Screening (If Applicable):   Lab Results   Component Value Date    COVID19 Not Detected 01/08/2021         Anesthesia Evaluation    Airway: Mallampati: III  TM distance: >3 FB   Neck ROM: full  Mouth opening: > = 3 FB Dental:    (+) upper dentures and lower dentures      Pulmonary:   (+) shortness of breath:                             Cardiovascular:    (+) LEVIN:,         Rhythm: regular                      Neuro/Psych:   (+) psychiatric history:            GI/Hepatic/Renal:            ROS comment: Breast CA. Endo/Other:    (+) malignancy/cancer. Abdominal:           Vascular:                                        Anesthesia Plan      MAC     ASA 3       Induction: intravenous.                           Teja Thomas MD   1/14/2021

## 2021-01-14 NOTE — PROCEDURES
[facility]) Physicians- The Heart and Vascular [address] [place] [address] Electrophysiology  Procedure Report  PATIENT: Sundeep [address] RECORD NUMBER: [id number]  DATE OF PROCEDURE:  1/14/2021  ATTENDING ELECTROPHYSIOLOGIST:  Aidan Brody MD  REFERRING PHYSICIAN: Dr. Fabian Luna:    1. Direct Current Electrical Cardioversion    INDICATION: Atrial tachycardia    PROCEDURE PERFORMED By: Aidan Brody MD    PROCEDURE TIME: Thirty minutes    COMPICATIONS: None immediately apparent    ANESTHESIA: LMAC    DESCRIPTION OF PROCEDURE: The risks, benefits, and alternatives to the procedure were discussed with the patient, and informed consent was obtained. The patient was brought to the cardiovascular lab and sedated under the guidance of anesthesia. Once anesthesia was deemed adequate, a 200 J biphasic synchronous shock was applied and successfully restored normal sinus rhythm. The patient was then allowed to wake in the usual fashion. Comment: The patient was therapeutically anticoagulated for a minimum of 3 consecutive weeks prior to cardioversion. SUMMARY:  1. Successful cardioversion of atrial tachycardia to sinus rhythm. RECOMMENDATIONS:  1. Discharge to home when fully awake and alert, if clinically stable. 2. Resume all pre-procedure medications, including anticoagulation. 3. Follow-up in 1 week for EKG and 1 month with provider.     Aidan Brody MD  Cardiac Electrophysiology  [address]  [facility] [place]: [facility]  2:02 PM  1/14/2021

## 2021-01-18 ENCOUNTER — TELEPHONE (OUTPATIENT)
Dept: VASCULAR SURGERY | Age: 76
End: 2021-01-18

## 2021-01-18 ENCOUNTER — TELEPHONE (OUTPATIENT)
Dept: NON INVASIVE DIAGNOSTICS | Age: 76
End: 2021-01-18

## 2021-01-18 NOTE — TELEPHONE ENCOUNTER
Patient called in asking if she can come in today and not Friday for EKG, she had CV last week and feels she might be out of rhythm. Please advise, thank you.

## 2021-01-22 ENCOUNTER — NURSE ONLY (OUTPATIENT)
Dept: NON INVASIVE DIAGNOSTICS | Age: 76
End: 2021-01-22
Payer: MEDICARE

## 2021-01-22 VITALS — TEMPERATURE: 96.4 F

## 2021-01-22 DIAGNOSIS — I48.91 ATRIAL FIBRILLATION WITH RVR (HCC): Primary | ICD-10-CM

## 2021-01-22 PROCEDURE — 93000 ELECTROCARDIOGRAM COMPLETE: CPT | Performed by: INTERNAL MEDICINE

## 2021-01-22 NOTE — PROGRESS NOTES
1 week EKG, s/p CV. She feels better today. She was SOB on Monday and thought she was out of rhythm. Unfortunlately, she did not return our call on Monday so we could check an EKG sooner. She has not missed any doses of Tikosyn, Xarelto, or metoprolol.

## 2021-02-08 ENCOUNTER — APPOINTMENT (OUTPATIENT)
Dept: CT IMAGING | Age: 76
DRG: 378 | End: 2021-02-08
Attending: FAMILY MEDICINE
Payer: MEDICARE

## 2021-02-08 ENCOUNTER — ANESTHESIA (OUTPATIENT)
Dept: ENDOSCOPY | Age: 76
DRG: 378 | End: 2021-02-08
Payer: MEDICARE

## 2021-02-08 ENCOUNTER — ANESTHESIA EVENT (OUTPATIENT)
Dept: ENDOSCOPY | Age: 76
DRG: 378 | End: 2021-02-08
Payer: MEDICARE

## 2021-02-08 ENCOUNTER — HOSPITAL ENCOUNTER (INPATIENT)
Age: 76
LOS: 3 days | Discharge: HOME HEALTH | DRG: 378 | End: 2021-02-11
Attending: FAMILY MEDICINE | Admitting: FAMILY MEDICINE
Payer: MEDICARE

## 2021-02-08 DIAGNOSIS — I48.91 ATRIAL FIBRILLATION, UNSPECIFIED TYPE (HCC): ICD-10-CM

## 2021-02-08 PROBLEM — K62.5 RECTAL BLEEDING: Status: ACTIVE | Noted: 2021-02-08

## 2021-02-08 LAB
ALBUMIN SERPL-MCNC: 3.5 G/DL (ref 3.5–5.2)
ALP BLD-CCNC: 62 U/L (ref 35–104)
ALT SERPL-CCNC: 7 U/L (ref 0–32)
ANION GAP SERPL CALCULATED.3IONS-SCNC: 9 MMOL/L (ref 7–16)
APTT: 35.6 SEC (ref 24.5–35.1)
AST SERPL-CCNC: 14 U/L (ref 0–31)
BILIRUB SERPL-MCNC: 0.7 MG/DL (ref 0–1.2)
BUN BLDV-MCNC: 21 MG/DL (ref 8–23)
C-REACTIVE PROTEIN: 1.3 MG/DL (ref 0–0.4)
CALCIUM SERPL-MCNC: 8.4 MG/DL (ref 8.6–10.2)
CHLORIDE BLD-SCNC: 105 MMOL/L (ref 98–107)
CO2: 25 MMOL/L (ref 22–29)
CREAT SERPL-MCNC: 0.8 MG/DL (ref 0.5–1)
GFR AFRICAN AMERICAN: >60
GFR NON-AFRICAN AMERICAN: >60 ML/MIN/1.73
GLUCOSE BLD-MCNC: 101 MG/DL (ref 74–99)
HCT VFR BLD CALC: 28.8 % (ref 34–48)
HCT VFR BLD CALC: 28.9 % (ref 34–48)
HEMOGLOBIN: 8.5 G/DL (ref 11.5–15.5)
HEMOGLOBIN: 8.5 G/DL (ref 11.5–15.5)
INR BLD: 3.1
LACTIC ACID: 1.5 MMOL/L (ref 0.5–2.2)
MCH RBC QN AUTO: 28.3 PG (ref 26–35)
MCHC RBC AUTO-ENTMCNC: 29.4 % (ref 32–34.5)
MCV RBC AUTO: 96.3 FL (ref 80–99.9)
PDW BLD-RTO: 17.4 FL (ref 11.5–15)
PLATELET # BLD: 238 E9/L (ref 130–450)
PMV BLD AUTO: 9.6 FL (ref 7–12)
POTASSIUM REFLEX MAGNESIUM: 4.3 MMOL/L (ref 3.5–5)
PROTHROMBIN TIME: 36.6 SEC (ref 9.3–12.4)
RBC # BLD: 3 E12/L (ref 3.5–5.5)
SEDIMENTATION RATE, ERYTHROCYTE: 26 MM/HR (ref 0–20)
SODIUM BLD-SCNC: 139 MMOL/L (ref 132–146)
TOTAL PROTEIN: 6.4 G/DL (ref 6.4–8.3)
WBC # BLD: 6.9 E9/L (ref 4.5–11.5)

## 2021-02-08 PROCEDURE — 1200000000 HC SEMI PRIVATE

## 2021-02-08 PROCEDURE — 85610 PROTHROMBIN TIME: CPT

## 2021-02-08 PROCEDURE — 80053 COMPREHEN METABOLIC PANEL: CPT

## 2021-02-08 PROCEDURE — 86140 C-REACTIVE PROTEIN: CPT

## 2021-02-08 PROCEDURE — 6370000000 HC RX 637 (ALT 250 FOR IP): Performed by: FAMILY MEDICINE

## 2021-02-08 PROCEDURE — 74178 CT ABD&PLV WO CNTR FLWD CNTR: CPT

## 2021-02-08 PROCEDURE — 85651 RBC SED RATE NONAUTOMATED: CPT

## 2021-02-08 PROCEDURE — 6360000004 HC RX CONTRAST MEDICATION: Performed by: RADIOLOGY

## 2021-02-08 PROCEDURE — 2580000003 HC RX 258: Performed by: STUDENT IN AN ORGANIZED HEALTH CARE EDUCATION/TRAINING PROGRAM

## 2021-02-08 PROCEDURE — 85730 THROMBOPLASTIN TIME PARTIAL: CPT

## 2021-02-08 PROCEDURE — 85014 HEMATOCRIT: CPT

## 2021-02-08 PROCEDURE — 85018 HEMOGLOBIN: CPT

## 2021-02-08 PROCEDURE — 2580000003 HC RX 258: Performed by: FAMILY MEDICINE

## 2021-02-08 PROCEDURE — 36415 COLL VENOUS BLD VENIPUNCTURE: CPT

## 2021-02-08 PROCEDURE — 6360000002 HC RX W HCPCS: Performed by: FAMILY MEDICINE

## 2021-02-08 PROCEDURE — 85027 COMPLETE CBC AUTOMATED: CPT

## 2021-02-08 PROCEDURE — 83605 ASSAY OF LACTIC ACID: CPT

## 2021-02-08 PROCEDURE — C9113 INJ PANTOPRAZOLE SODIUM, VIA: HCPCS | Performed by: FAMILY MEDICINE

## 2021-02-08 RX ORDER — ACETAMINOPHEN 650 MG/1
650 SUPPOSITORY RECTAL EVERY 6 HOURS PRN
Status: DISCONTINUED | OUTPATIENT
Start: 2021-02-08 | End: 2021-02-11 | Stop reason: HOSPADM

## 2021-02-08 RX ORDER — SODIUM CHLORIDE 0.9 % (FLUSH) 0.9 %
10 SYRINGE (ML) INJECTION EVERY 12 HOURS SCHEDULED
Status: DISCONTINUED | OUTPATIENT
Start: 2021-02-08 | End: 2021-02-11 | Stop reason: HOSPADM

## 2021-02-08 RX ORDER — PANTOPRAZOLE SODIUM 40 MG/10ML
40 INJECTION, POWDER, LYOPHILIZED, FOR SOLUTION INTRAVENOUS 2 TIMES DAILY
Status: DISCONTINUED | OUTPATIENT
Start: 2021-02-08 | End: 2021-02-11

## 2021-02-08 RX ORDER — ATORVASTATIN CALCIUM 10 MG/1
10 TABLET, FILM COATED ORAL NIGHTLY
Status: DISCONTINUED | OUTPATIENT
Start: 2021-02-08 | End: 2021-02-11 | Stop reason: HOSPADM

## 2021-02-08 RX ORDER — SODIUM CHLORIDE 9 MG/ML
10 INJECTION INTRAVENOUS 2 TIMES DAILY
Status: DISCONTINUED | OUTPATIENT
Start: 2021-02-08 | End: 2021-02-11 | Stop reason: ALTCHOICE

## 2021-02-08 RX ORDER — SODIUM CHLORIDE 9 MG/ML
INJECTION, SOLUTION INTRAVENOUS CONTINUOUS
Status: DISCONTINUED | OUTPATIENT
Start: 2021-02-08 | End: 2021-02-09

## 2021-02-08 RX ORDER — SODIUM CHLORIDE 9 MG/ML
10 INJECTION INTRAVENOUS EVERY 12 HOURS
Status: DISCONTINUED | OUTPATIENT
Start: 2021-02-08 | End: 2021-02-08 | Stop reason: SDUPTHER

## 2021-02-08 RX ORDER — ACETAMINOPHEN 325 MG/1
650 TABLET ORAL EVERY 6 HOURS PRN
Status: DISCONTINUED | OUTPATIENT
Start: 2021-02-08 | End: 2021-02-11 | Stop reason: HOSPADM

## 2021-02-08 RX ORDER — FERROUS SULFATE 325(65) MG
325 TABLET ORAL 2 TIMES DAILY WITH MEALS
Status: DISCONTINUED | OUTPATIENT
Start: 2021-02-08 | End: 2021-02-11

## 2021-02-08 RX ORDER — POTASSIUM CHLORIDE 20 MEQ/1
20 TABLET, EXTENDED RELEASE ORAL PRN
Status: DISCONTINUED | OUTPATIENT
Start: 2021-02-08 | End: 2021-02-11 | Stop reason: HOSPADM

## 2021-02-08 RX ORDER — GABAPENTIN 100 MG/1
100 CAPSULE ORAL 3 TIMES DAILY
Status: DISCONTINUED | OUTPATIENT
Start: 2021-02-08 | End: 2021-02-11 | Stop reason: HOSPADM

## 2021-02-08 RX ORDER — DOFETILIDE 0.25 MG/1
250 CAPSULE ORAL EVERY 12 HOURS SCHEDULED
Status: DISCONTINUED | OUTPATIENT
Start: 2021-02-08 | End: 2021-02-11 | Stop reason: HOSPADM

## 2021-02-08 RX ORDER — SODIUM CHLORIDE 0.9 % (FLUSH) 0.9 %
10 SYRINGE (ML) INJECTION PRN
Status: DISCONTINUED | OUTPATIENT
Start: 2021-02-08 | End: 2021-02-11 | Stop reason: HOSPADM

## 2021-02-08 RX ORDER — PAROXETINE HYDROCHLORIDE 20 MG/1
20 TABLET, FILM COATED ORAL EVERY MORNING
Status: DISCONTINUED | OUTPATIENT
Start: 2021-02-08 | End: 2021-02-11 | Stop reason: HOSPADM

## 2021-02-08 RX ORDER — ALPRAZOLAM 0.25 MG/1
0.25 TABLET ORAL 3 TIMES DAILY PRN
Status: DISCONTINUED | OUTPATIENT
Start: 2021-02-08 | End: 2021-02-11 | Stop reason: HOSPADM

## 2021-02-08 RX ADMIN — SODIUM CHLORIDE: 9 INJECTION, SOLUTION INTRAVENOUS at 14:18

## 2021-02-08 RX ADMIN — PANTOPRAZOLE SODIUM 40 MG: 40 INJECTION, POWDER, FOR SOLUTION INTRAVENOUS at 15:37

## 2021-02-08 RX ADMIN — DOFETILIDE 250 MCG: 0.25 CAPSULE ORAL at 21:08

## 2021-02-08 RX ADMIN — SODIUM CHLORIDE: 9 INJECTION, SOLUTION INTRAVENOUS at 21:09

## 2021-02-08 RX ADMIN — IOPAMIDOL 75 ML: 755 INJECTION, SOLUTION INTRAVENOUS at 19:12

## 2021-02-08 RX ADMIN — Medication 10 ML: at 21:09

## 2021-02-08 RX ADMIN — GABAPENTIN 100 MG: 100 CAPSULE ORAL at 15:37

## 2021-02-08 RX ADMIN — ATORVASTATIN CALCIUM 10 MG: 10 TABLET, FILM COATED ORAL at 21:08

## 2021-02-08 RX ADMIN — GABAPENTIN 100 MG: 100 CAPSULE ORAL at 21:08

## 2021-02-08 RX ADMIN — SODIUM CHLORIDE, PRESERVATIVE FREE 10 ML: 5 INJECTION INTRAVENOUS at 15:38

## 2021-02-08 ASSESSMENT — ENCOUNTER SYMPTOMS: SHORTNESS OF BREATH: 1

## 2021-02-08 NOTE — CONSULTS
GENERAL SURGERY  CONSULT NOTE  2/8/2021    Physician Consulted: Dr. Cori Glass  Reason for Consult: GIB  Referring Physician: Dr. Chris DODGE  Joni Woodall is a 70 y.o. female who presents for evaluation of GIB. Patient states she had sudden onset maroon stools Saturday evening. This continued on to Sunday and she saw her primary care physician this morning for further evaluation. States she was advised to come to the ED for admission. Patient states she had at least 6 maroon stools since this began. She did note some indigestion, abdominal discomfort during this time in the epigastric region. She denies nausea, vomiting, fevers, chills. She denies any prior episodes. She does admit to feeling dizzy and short of breath. She is on Xarelto for A. fib. She denies taking any NSAIDs, chronic steroid use. States last colonoscopy was 5 to 6 years ago with a gastroenterologist, states that there was a polyp removed however no note of diverticular disease. She denies any prior history of EGD. Hemoglobin 8.5 baseline about 12. INR 3.1. Past Medical History:   Diagnosis Date    AF (atrial fibrillation) (HCC)     Anxiety     Atrial tachycardia ([facility] [address] 01/14/2021    Breast cancer (HCC)     right chest wall involvement    Depression     Hyperlipidemia     Lymphadenopathy     Osteoporosis     Radiation     Status post chemotherapy     neoadjuvant chemo with AC, Taxol, carboplatin, and Herceptin       Past Surgical History:   Procedure Laterality Date    BRONCHOSCOPY  07/12/2012    ebus    CARDIAC CATHETERIZATION Right 04/03/2014    Dr. Jimmy Prado  11/16/2016    CARDIOVERSION  01/14/2021    Successful    (Dr. Pierce Stage)   [address]      RT - 2010    MASTECTOMY Right 02/06/2009    TONSILLECTOMY      TUNNELED VENOUS PORT PLACEMENT         Medications Prior to Admission:    Prior to Admission medications    Medication Sig Start Date End Date Taking?  Authorizing Provider TOPROL XL 25 MG extended release tablet Take 1 tablet by mouth daily 1/12/21   Radha Ingram APRN - NP   rivaroxaban Kam Pica) 20 MG TABS tablet Take 1 tablet by mouth Daily with supper 6/2/20   Glorine Gums, DO   dofetilide [facility]) 250 MCG capsule Take 1 capsule by mouth every 12 hours 6/2/20   Glorine Gums, DO   gabapentin (NEURONTIN) 100 MG capsule Take 100 mg by mouth 3 times daily. 10/23/19   Historical Provider, MD   bumetanide (BUMEX) 1 MG tablet Take 1 tablet by mouth daily  Patient taking differently: Take 1 mg by mouth daily Take additional dose as needed 2/22/19   GIOVANNI Horowitz   lisinopril (PRINIVIL;ZESTRIL) 5 MG tablet Take 1 tablet by mouth daily 1/19/19   Harry Lira DO   potassium chloride (KLOR-CON M) 20 MEQ extended release tablet Take 1 tablet by mouth daily as needed (take with Lasix)  Patient taking differently: Take 20 mEq by mouth as needed (take with Lasix)  8/28/18   Glorine Gums, DO   ferrous sulfate 325 (65 Fe) MG tablet Take 325 mg by mouth 2 times daily    Historical Provider, MD   PARoxetine (PAXIL) 20 MG tablet Take 1 tablet by mouth every morning 10/4/17   Lisa Gutierrez MD   atorvastatin (LIPITOR) 10 MG tablet Take 10 mg by mouth nightly     Historical Provider, MD   ALPRAZolam (XANAX) 0.25 MG tablet Take 0.25 mg by mouth 3 times daily as needed for Anxiety. . 3/24/16   Historical Provider, MD   calcium carbonate (OSCAL) 500 MG TABS tablet Take 500 mg by mouth every morning     Historical Provider, MD       Allergies   Allergen Reactions    Sulfa Antibiotics        Family History   Problem Relation Age of Onset    Cancer Mother         female organs    Cancer Father         bowel?     Breast Cancer Maternal Aunt        Social History     Tobacco Use    Smoking status: Never Smoker    Smokeless tobacco: Never Used   Substance Use Topics    Alcohol use: No    Drug use: No         Review of Systems   General ROS: dizzy Hematological and Lymphatic ROS: negative  Respiratory ROS: SOB  Cardiovascular ROS: negative  Gastrointestinal ROS: see HPI  Genito-Urinary ROS: negative  Musculoskeletal ROS: negative      PHYSICAL EXAM:    Vitals:    02/08/21 1515   BP: (!) 95/54   Pulse: 63   Resp: 16   Temp: 97.8 °F (36.6 °C)       General Appearance:  awake, alert, oriented, in no acute distress  Lungs:  Slightly tachypnic on room air  Heart:  RR  Abdomen:  Soft, minimally tender epigastrium, ND  Extremities: pulses present in all extremities  Female Rectal: Rectal exam deferred, maroon stool in toilet    LABS:    CBC  Recent Labs     02/08/21  1319   WBC 6.9   HGB 8.5*   HCT 28.9*        BMP  Recent Labs     02/08/21  1319      K 4.3      CO2 25   BUN 21   CREATININE 0.8   CALCIUM 8.4*     Liver Function  Recent Labs     02/08/21  1319   BILITOT 0.7   AST 14   ALT 7   ALKPHOS 62   PROT 6.4   LABALBU 3.5     No results for input(s): LACTATE in the last 72 hours. Recent Labs     02/08/21  1319   INR 3.1       RADIOLOGY    No results found.       ASSESSMENT:  70 y.o. female with maroon stool    PLAN:  - EGD 2/9  - CLD, NPOMN  - H/H q6 hr, transfuse prn hgb <7  - PPI BID  - Monitor for bloody BM  - Consider FFP given INR 3.1 if she continues to bleed, HD stable for now  - Hold blood thinning medications  - Discussed with Dr. Shelby Raymundo      Electronically signed by Dorothy Phillips MD on 2/8/21 at 4:50 PM EST

## 2021-02-09 VITALS
SYSTOLIC BLOOD PRESSURE: 90 MMHG | OXYGEN SATURATION: 98 % | RESPIRATION RATE: 27 BRPM | DIASTOLIC BLOOD PRESSURE: 60 MMHG

## 2021-02-09 LAB
HCT VFR BLD CALC: 26 % (ref 34–48)
HCT VFR BLD CALC: 26.9 % (ref 34–48)
HCT VFR BLD CALC: 27.3 % (ref 34–48)
HCT VFR BLD CALC: 27.4 % (ref 34–48)
HEMOGLOBIN: 7.7 G/DL (ref 11.5–15.5)
HEMOGLOBIN: 7.8 G/DL (ref 11.5–15.5)
HEMOGLOBIN: 8 G/DL (ref 11.5–15.5)
HEMOGLOBIN: 8 G/DL (ref 11.5–15.5)
INR BLD: 2
IRON SATURATION: 12 % (ref 15–50)
IRON: 31 MCG/DL (ref 37–145)
PROTHROMBIN TIME: 22.7 SEC (ref 9.3–12.4)
TOTAL IRON BINDING CAPACITY: 269 MCG/DL (ref 250–450)

## 2021-02-09 PROCEDURE — 6360000002 HC RX W HCPCS: Performed by: FAMILY MEDICINE

## 2021-02-09 PROCEDURE — 2580000003 HC RX 258: Performed by: STUDENT IN AN ORGANIZED HEALTH CARE EDUCATION/TRAINING PROGRAM

## 2021-02-09 PROCEDURE — C9113 INJ PANTOPRAZOLE SODIUM, VIA: HCPCS | Performed by: FAMILY MEDICINE

## 2021-02-09 PROCEDURE — 7100000011 HC PHASE II RECOVERY - ADDTL 15 MIN: Performed by: SURGERY

## 2021-02-09 PROCEDURE — 6370000000 HC RX 637 (ALT 250 FOR IP): Performed by: SURGERY

## 2021-02-09 PROCEDURE — 3609017100 HC EGD: Performed by: SURGERY

## 2021-02-09 PROCEDURE — 3700000000 HC ANESTHESIA ATTENDED CARE: Performed by: SURGERY

## 2021-02-09 PROCEDURE — 3700000001 HC ADD 15 MINUTES (ANESTHESIA): Performed by: SURGERY

## 2021-02-09 PROCEDURE — 85014 HEMATOCRIT: CPT

## 2021-02-09 PROCEDURE — 2580000003 HC RX 258: Performed by: NURSE ANESTHETIST, CERTIFIED REGISTERED

## 2021-02-09 PROCEDURE — 85018 HEMOGLOBIN: CPT

## 2021-02-09 PROCEDURE — 7100000010 HC PHASE II RECOVERY - FIRST 15 MIN: Performed by: SURGERY

## 2021-02-09 PROCEDURE — 2709999900 HC NON-CHARGEABLE SUPPLY: Performed by: SURGERY

## 2021-02-09 PROCEDURE — 36415 COLL VENOUS BLD VENIPUNCTURE: CPT

## 2021-02-09 PROCEDURE — 2700000000 HC OXYGEN THERAPY PER DAY

## 2021-02-09 PROCEDURE — 2580000003 HC RX 258: Performed by: FAMILY MEDICINE

## 2021-02-09 PROCEDURE — 83540 ASSAY OF IRON: CPT

## 2021-02-09 PROCEDURE — 6360000002 HC RX W HCPCS: Performed by: SURGERY

## 2021-02-09 PROCEDURE — 85610 PROTHROMBIN TIME: CPT

## 2021-02-09 PROCEDURE — 83550 IRON BINDING TEST: CPT

## 2021-02-09 PROCEDURE — 1200000000 HC SEMI PRIVATE

## 2021-02-09 PROCEDURE — 2580000003 HC RX 258: Performed by: SURGERY

## 2021-02-09 PROCEDURE — C9113 INJ PANTOPRAZOLE SODIUM, VIA: HCPCS | Performed by: SURGERY

## 2021-02-09 PROCEDURE — 6360000002 HC RX W HCPCS: Performed by: NURSE ANESTHETIST, CERTIFIED REGISTERED

## 2021-02-09 PROCEDURE — 36591 DRAW BLOOD OFF VENOUS DEVICE: CPT

## 2021-02-09 PROCEDURE — 6370000000 HC RX 637 (ALT 250 FOR IP): Performed by: FAMILY MEDICINE

## 2021-02-09 PROCEDURE — 0DJ08ZZ INSPECTION OF UPPER INTESTINAL TRACT, VIA NATURAL OR ARTIFICIAL OPENING ENDOSCOPIC: ICD-10-PCS | Performed by: SURGERY

## 2021-02-09 RX ORDER — PROPOFOL 10 MG/ML
INJECTION, EMULSION INTRAVENOUS PRN
Status: DISCONTINUED | OUTPATIENT
Start: 2021-02-09 | End: 2021-02-09 | Stop reason: SDUPTHER

## 2021-02-09 RX ORDER — HEPARIN SODIUM (PORCINE) LOCK FLUSH IV SOLN 100 UNIT/ML 100 UNIT/ML
300 SOLUTION INTRAVENOUS PRN
Status: DISCONTINUED | OUTPATIENT
Start: 2021-02-09 | End: 2021-02-11 | Stop reason: HOSPADM

## 2021-02-09 RX ORDER — SODIUM CHLORIDE 9 MG/ML
INJECTION, SOLUTION INTRAVENOUS CONTINUOUS PRN
Status: DISCONTINUED | OUTPATIENT
Start: 2021-02-09 | End: 2021-02-09 | Stop reason: SDUPTHER

## 2021-02-09 RX ADMIN — GABAPENTIN 100 MG: 100 CAPSULE ORAL at 20:04

## 2021-02-09 RX ADMIN — PROPOFOL 100 MG: 10 INJECTION, EMULSION INTRAVENOUS at 11:39

## 2021-02-09 RX ADMIN — PANTOPRAZOLE SODIUM 40 MG: 40 INJECTION, POWDER, FOR SOLUTION INTRAVENOUS at 16:09

## 2021-02-09 RX ADMIN — PAROXETINE HYDROCHLORIDE 20 MG: 20 TABLET, FILM COATED ORAL at 08:10

## 2021-02-09 RX ADMIN — SODIUM CHLORIDE, PRESERVATIVE FREE 10 ML: 5 INJECTION INTRAVENOUS at 16:17

## 2021-02-09 RX ADMIN — SODIUM CHLORIDE, PRESERVATIVE FREE 10 ML: 5 INJECTION INTRAVENOUS at 05:41

## 2021-02-09 RX ADMIN — FERROUS SULFATE TAB 325 MG (65 MG ELEMENTAL FE) 325 MG: 325 (65 FE) TAB at 16:21

## 2021-02-09 RX ADMIN — PANTOPRAZOLE SODIUM 40 MG: 40 INJECTION, POWDER, FOR SOLUTION INTRAVENOUS at 05:39

## 2021-02-09 RX ADMIN — Medication 10 ML: at 20:05

## 2021-02-09 RX ADMIN — Medication 10 ML: at 09:22

## 2021-02-09 RX ADMIN — DOFETILIDE 250 MCG: 0.25 CAPSULE ORAL at 20:04

## 2021-02-09 RX ADMIN — ATORVASTATIN CALCIUM 10 MG: 10 TABLET, FILM COATED ORAL at 20:04

## 2021-02-09 RX ADMIN — SODIUM CHLORIDE: 9 INJECTION, SOLUTION INTRAVENOUS at 10:07

## 2021-02-09 RX ADMIN — SODIUM CHLORIDE: 9 INJECTION, SOLUTION INTRAVENOUS at 11:35

## 2021-02-09 RX ADMIN — FERROUS SULFATE TAB 325 MG (65 MG ELEMENTAL FE) 325 MG: 325 (65 FE) TAB at 08:09

## 2021-02-09 RX ADMIN — GABAPENTIN 100 MG: 100 CAPSULE ORAL at 14:24

## 2021-02-09 RX ADMIN — DOFETILIDE 250 MCG: 0.25 CAPSULE ORAL at 08:09

## 2021-02-09 NOTE — OP NOTE
Doug Seen  YOB: 1945  [id number]    Pre-operative Diagnosis:  UGI bleed    Post-operative Diagnosis: Hemorrhagic gastritis no active bleeding    Procedure: EGD     Anesthesia: [facility]    Surgeon: Jeromy Miller MD    Assistant: None    Estimated Blood Loss: none    Complications: none    Specimens: None    Procedure:  Pt was taken to the endoscopy suite and placed on the endoscopy table in a left lateral decubitus position. LMAC anesthesia was administered and a bite block was inserted. A lubricated gastroscope was inserted into the oropharynx and advanced into the esophagus. The esophagus was inspected throughout its length. There were no varices. No evidence of esophagitis. The GE junction was sharp and located at 40 cm. The stomach was entered and insufflated. There was some blood staining along the greater curve. It was irrigated clear. No evidence of active bleeding. There were diffuse inflammatory changes. No ulcerations were noted. More significant inflammatory changes were noted in the antrum. Biopsies were not taken due to the patient's anticoagulation. The pylorus was intubated. The first and second portions of the duodenum were normal.  The scope was pulled back into the antrum and retroflexed. The angle of the stomach was normal.  The proximal greater and lesser curves were normal.  The fundus was normal.  At the GE junction, there was no evidence of hiatal hernia. The stomach was deflated and the scope was withdrawn and removed. The patient tolerated the procedure well. Impression: Hemorrhagic gastritis. No active bleeding at this time    Plan: Continue PPI. Monitor hemoglobin.   Should be able to resume anticoagulation in 24 hours      Nyla Ness MD
7

## 2021-02-09 NOTE — H&P
69 yo female presents to office with mucousy, bloody bm since Friday, bright red to dark, not formed, had 6 on Sunday, minimally tender lower abd and epigastrium on blodd thinners for a fib, no n/v, fever or chills  Wbc nl, hgb 8.5 initially, bp 90s, no sob or cp. Admitted for further work up and tx    Past Medical History:   Diagnosis Date    AF (atrial fibrillation) ([facility] [address]     Anxiety     Atrial tachycardia ([facility] [address] 01/14/2021    Breast cancer ([facility] [address]     right chest wall involvement    Depression     Hyperlipidemia     Lymphadenopathy     Osteoporosis     Radiation     Status post chemotherapy     neoadjuvant chemo with AC, Taxol, carboplatin, and Herceptin       Past Surgical History:   Procedure Laterality Date    BRONCHOSCOPY  07/12/2012    ebus    CARDIAC CATHETERIZATION Right 04/03/2014    Dr. Walt Mandujano  11/16/2016    CARDIOVERSION  01/14/2021    Successful    (Dr. Susan Clark)    MASTECTOMY      RT - 2010    MASTECTOMY Right 02/06/2009    TONSILLECTOMY      TUNNELED VENOUS PORT PLACEMENT         Scheduled Meds:   atorvastatin  10 mg Oral Nightly    dofetilide  250 mcg Oral 2 times per day    ferrous sulfate  325 mg Oral BID WC    gabapentin  100 mg Oral TID    PARoxetine  20 mg Oral QAM    sodium chloride flush  10 mL Intravenous 2 times per day    pantoprazole  40 mg Intravenous BID    sodium chloride (PF)  10 mL Intravenous BID     Continuous Infusions:   sodium chloride 125 mL/hr at 02/08/21 2109     No current facility-administered medications on file prior to encounter.       Current Outpatient Medications on File Prior to Encounter   Medication Sig Dispense Refill    TOPROL XL 25 MG extended release tablet Take 1 tablet by mouth daily 30 tablet 11    rivaroxaban (XARELTO) 20 MG TABS tablet Take 1 tablet by mouth Daily with supper 90 tablet 3    dofetilide (TIKOSYN) 250 MCG capsule Take 1 capsule by mouth every 12 hours 180 capsule 3  gabapentin (NEURONTIN) 100 MG capsule Take 100 mg by mouth 3 times daily.  bumetanide (BUMEX) 1 MG tablet Take 1 tablet by mouth daily (Patient taking differently: Take 1 mg by mouth daily Take additional dose as needed) 90 tablet 3    lisinopril (PRINIVIL;ZESTRIL) 5 MG tablet Take 1 tablet by mouth daily 30 tablet 3    potassium chloride (KLOR-CON M) 20 MEQ extended release tablet Take 1 tablet by mouth daily as needed (take with Lasix) (Patient taking differently: Take 20 mEq by mouth as needed (take with Lasix) ) 30 tablet 5    ferrous sulfate 325 (65 Fe) MG tablet Take 325 mg by mouth 2 times daily      PARoxetine (PAXIL) 20 MG tablet Take 1 tablet by mouth every morning 90 tablet 1    atorvastatin (LIPITOR) 10 MG tablet Take 10 mg by mouth nightly       ALPRAZolam (XANAX) 0.25 MG tablet Take 0.25 mg by mouth 3 times daily as needed for Anxiety. 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   Social Needs    Financial resource strain: Not on file    Food insecurity     Worry: Not on file     Inability: Not on file    Transportation needs     Medical: Not on file     Non-medical: Not on file   Tobacco Use    Smoking status: Never Smoker    Smokeless tobacco: Never Used   Substance and Sexual Activity    Alcohol use: No    Drug use: No    Sexual activity: Never   Lifestyle    Physical activity     Days per week: Not on file     Minutes per session: Not on file    Stress: Not on file   Relationships    Social connections     Talks on phone: Not on file     Gets together: Not on file     Attends Rastafari service: Not on file Active member of club or organization: Not on file     Attends meetings of clubs or organizations: Not on file     Relationship status: Not on file    Intimate partner violence     Fear of current or ex partner: Not on file     Emotionally abused: Not on file     Physically abused: Not on file     Forced sexual activity: Not on file   Other Topics Concern    Not on file   Social History Narrative    Not on file       Allergies   Allergen Reactions    Sulfa Antibiotics        Family History   Problem Relation Age of Onset    Cancer Mother         female organs    Cancer Father         bowel?  Breast Cancer Maternal Aunt        Blood pressure (!) 120/57, pulse 76, temperature 97.8 °F (36.6 °C), temperature source Oral, resp. rate 30, height 5' 6\" (1.676 m), weight 181 lb (82.1 kg), SpO2 96 %, not currently breastfeeding.   awake, alert  Pale  Hydrated  No jvd or adenopathy  h irr/irr  Lungs ctab  abd good bs soft, sensitive lower quads, no guarding  No edema    A; acute gi bleed  Acute on chronic blood loss anemia  Chronic afib  Anxiety  For egd, holding anti coagulation  Check labs  ppi

## 2021-02-09 NOTE — PLAN OF CARE
Problem: Bleeding:  Goal: Will show no signs and symptoms of excessive bleeding  Description: Will show no signs and symptoms of excessive bleeding  Outcome: Met This Shift

## 2021-02-09 NOTE — CARE COORDINATION
Social Work / Discharge Planning : Patient was admitted with rectal bleed / dizziness. Patient to have an EGD today. SW met with patient and explained role as discharge planner/ transition of care. Patient verified plan at discharge is home. Patient denies any current needs. Patient resides independently with her son and daughter in law. She has NO assistive device and still drives. Patient wears 2 liters ofr 02 at home and Desiree Sprout is [employer]. Patient denies hx of HHC/SNF. Patient PCP is DR Anay Boswell and she uses [facility] on [address]. Await plan. SW to follow.  Electronically signed by JIMENEZ Gaspar on 2/9/21 at 9:25 AM EST

## 2021-02-09 NOTE — ANESTHESIA PRE PROCEDURE
Department of Anesthesiology  Preprocedure Note       Name:  Melissa Oleary   Age:  70 y.o.  :  1945                                          MRN:  [id number]         Date:  2021      Surgeon: Mahi Pedro    Procedure: EGD    Medications prior to admission:   Prior to Admission medications    Medication Sig Start Date End Date Taking? Authorizing Provider   TOPROL XL 25 MG extended release tablet Take 1 tablet by mouth daily 21   CHAPARRO Julian - NP   rivaroxaban Suzy Ringer) 20 MG TABS tablet Take 1 tablet by mouth Daily with supper 20   Barbara Marin DO   dofetilide [facility]) 250 MCG capsule Take 1 capsule by mouth every 12 hours 20   Barbara Marin DO   gabapentin (NEURONTIN) 100 MG capsule Take 100 mg by mouth 3 times daily. 10/23/19   Historical Provider, MD   bumetanide (BUMEX) 1 MG tablet Take 1 tablet by mouth daily  Patient taking differently: Take 1 mg by mouth daily Take additional dose as needed 19   GIOVANNI Lan Aas   lisinopril (PRINIVIL;ZESTRIL) 5 MG tablet Take 1 tablet by mouth daily 19   Melissa Bullard DO   potassium chloride (KLOR-CON M) 20 MEQ extended release tablet Take 1 tablet by mouth daily as needed (take with Lasix)  Patient taking differently: Take 20 mEq by mouth as needed (take with Lasix)  18   Barbara Marin DO   ferrous sulfate 325 (65 Fe) MG tablet Take 325 mg by mouth 2 times daily    Historical Provider, MD   PARoxetine (PAXIL) 20 MG tablet Take 1 tablet by mouth every morning 10/4/17   Rosy Morel MD   atorvastatin (LIPITOR) 10 MG tablet Take 10 mg by mouth nightly     Historical Provider, MD   ALPRAZolam (XANAX) 0.25 MG tablet Take 0.25 mg by mouth 3 times daily as needed for Anxiety. . 3/24/16   Historical Provider, MD   calcium carbonate (OSCAL) 500 MG TABS tablet Take 500 mg by mouth every morning     Historical Provider, MD       Current medications:    No current facility-administered medications for this visit. No current outpatient medications on file. Facility-Administered Medications Ordered in Other Visits   Medication Dose Route Frequency Provider Last Rate Last Admin    ALPRAZolam Nola ) tablet 0.25 mg  0.25 mg Oral TID PRN Marylu Arias, DO        atorvastatin (LIPITOR) tablet 10 mg  10 mg Oral Nightly Marylu Arias, DO        dofetilide (TIKOSYN) capsule 250 mcg  250 mcg Oral 2 times per day Marylu Arias, DO        ferrous sulfate (IRON 325) tablet 325 mg  325 mg Oral BID WC Marylu Arias, DO   Stopped at 02/08/21 1705    gabapentin (NEURONTIN) capsule 100 mg  100 mg Oral TID Marylu Arias, DO   100 mg at 02/08/21 1537    PARoxetine (PAXIL) tablet 20 mg  20 mg Oral QAM Marylu Arias, DO        potassium chloride (KLOR-CON M) extended release tablet 20 mEq  20 mEq Oral PRN Marylu Arias, DO        sodium chloride flush 0.9 % injection 10 mL  10 mL Intravenous 2 times per day Marylu Arias, DO        sodium chloride flush 0.9 % injection 10 mL  10 mL Intravenous PRN Marylu Arias, DO        acetaminophen (TYLENOL) tablet 650 mg  650 mg Oral Q6H PRN Marylu Arias, DO        Or    acetaminophen (TYLENOL) suppository 650 mg  650 mg Rectal Q6H PRN Marylu Arias, DO        0.9 % sodium chloride infusion   Intravenous Continuous Mickey Oscar  mL/hr at 02/08/21 1705 Rate Change at 02/08/21 1705    pantoprazole (PROTONIX) injection 40 mg  40 mg Intravenous BID Marylu Arias, DO   40 mg at 02/08/21 1537    sodium chloride (PF) 0.9 % injection 10 mL  10 mL Intravenous BID Marylu Arias, DO   10 mL at 02/08/21 1538       Allergies:     Allergies   Allergen Reactions    Sulfa Antibiotics        Problem List:    Patient Active Problem List   Diagnosis Code    Adenopathy R59.1    Malignant neoplasm of breast ([facility] [address] C50.919    Atrial fibrillation with RVR (HCC) I48.91    Dyspnea on exertion R06.00    Chronic anticoagulation Z79.01    Iron deficiency anemia D50.9 RDW 17.4 02/08/2021     02/08/2021       CMP:   Lab Results   Component Value Date     02/08/2021    K 4.3 02/08/2021     02/08/2021    CO2 25 02/08/2021    BUN 21 02/08/2021    CREATININE 0.8 02/08/2021    GFRAA >60 02/08/2021    LABGLOM >60 02/08/2021    GLUCOSE 101 02/08/2021    GLUCOSE 95 02/20/2012    PROT 6.4 02/08/2021    CALCIUM 8.4 02/08/2021    BILITOT 0.7 02/08/2021    ALKPHOS 62 02/08/2021    AST 14 02/08/2021    ALT 7 02/08/2021       POC Tests: No results for input(s): POCGLU, POCNA, POCK, POCCL, POCBUN, POCHEMO, POCHCT in the last 72 hours. Coags:   Lab Results   Component Value Date    PROTIME 36.6 02/08/2021    INR 3.1 02/08/2021    APTT 35.6 02/08/2021       HCG (If Applicable): No results found for: PREGTESTUR, PREGSERUM, HCG, HCGQUANT     ABGs: No results found for: PHART, PO2ART, HIG1YYO, TEN4VWK, BEART, K2GFNBMY     Type & Screen (If Applicable):  No results found for: LABABO, LABRH    Drug/Infectious Status (If Applicable):  No results found for: HIV, HEPCAB    COVID-19 Screening (If Applicable):   Lab Results   Component Value Date    COVID19 Not Detected 01/08/2021         Anesthesia Evaluation  Patient summary reviewed no history of anesthetic complications:   Airway: Mallampati: III  TM distance: >3 FB   Neck ROM: full  Mouth opening: > = 3 FB Dental:    (+) upper dentures and lower dentures      Pulmonary: breath sounds clear to auscultation  (+) shortness of breath:                             Cardiovascular:    (+) hypertension: moderate, dysrhythmias (recent cardioversion): atrial fibrillation, LEVIN:, hyperlipidemia      ECG reviewed  Rhythm: regular    Echocardiogram reviewed         Beta Blocker:  Dose within 24 Hrs         Neuro/Psych:   (+) psychiatric history:            GI/Hepatic/Renal:        (-) liver disease and no renal disease      ROS comment: Rectal bleeding . Endo/Other:    (+) malignancy/cancer.                  Abdominal:           Vascular: Anesthesia Plan      MAC     ASA 4       Induction: intravenous. MIPS: Prophylactic antiemetics administered. Anesthetic plan and risks discussed with patient. Plan discussed with CRNA. Patient to be re-evaluated DOS by anesthesia team      304 Justin Edwards DO   2/8/2021    DOS STAFF ADDENDUM:    Pt seen and examined, chart reviewed (including anesthesia, drug and allergy history). Anesthetic plan, risks, benefits, alternatives, and personnel involved discussed with patient. Patient verbalized an understanding and agrees to proceed. Plan discussed with care team members and agreed upon.     Abby Fisher MD  Staff Anesthesiologist  11:35 AM

## 2021-02-10 LAB
ABO/RH: NORMAL
ANTIBODY SCREEN: NORMAL
HCT VFR BLD CALC: 28.1 % (ref 34–48)
HCT VFR BLD CALC: 29.8 % (ref 34–48)
HEMOGLOBIN: 7.7 G/DL (ref 11.5–15.5)
HEMOGLOBIN: 8.3 G/DL (ref 11.5–15.5)
INR BLD: 1.3
PROTHROMBIN TIME: 15.8 SEC (ref 9.3–12.4)

## 2021-02-10 PROCEDURE — 85018 HEMOGLOBIN: CPT

## 2021-02-10 PROCEDURE — 86850 RBC ANTIBODY SCREEN: CPT

## 2021-02-10 PROCEDURE — 6360000002 HC RX W HCPCS: Performed by: SURGERY

## 2021-02-10 PROCEDURE — 6370000000 HC RX 637 (ALT 250 FOR IP): Performed by: FAMILY MEDICINE

## 2021-02-10 PROCEDURE — 6370000000 HC RX 637 (ALT 250 FOR IP): Performed by: SURGERY

## 2021-02-10 PROCEDURE — 86920 COMPATIBILITY TEST SPIN: CPT

## 2021-02-10 PROCEDURE — 85610 PROTHROMBIN TIME: CPT

## 2021-02-10 PROCEDURE — 6360000002 HC RX W HCPCS: Performed by: FAMILY MEDICINE

## 2021-02-10 PROCEDURE — 85014 HEMATOCRIT: CPT

## 2021-02-10 PROCEDURE — 86900 BLOOD TYPING SEROLOGIC ABO: CPT

## 2021-02-10 PROCEDURE — 2580000003 HC RX 258: Performed by: SURGERY

## 2021-02-10 PROCEDURE — 36415 COLL VENOUS BLD VENIPUNCTURE: CPT

## 2021-02-10 PROCEDURE — 1200000000 HC SEMI PRIVATE

## 2021-02-10 PROCEDURE — C9113 INJ PANTOPRAZOLE SODIUM, VIA: HCPCS | Performed by: SURGERY

## 2021-02-10 PROCEDURE — 86901 BLOOD TYPING SEROLOGIC RH(D): CPT

## 2021-02-10 PROCEDURE — 2700000000 HC OXYGEN THERAPY PER DAY

## 2021-02-10 RX ORDER — CALCIUM CARBONATE 500(1250)
500 TABLET ORAL EVERY MORNING
Status: DISCONTINUED | OUTPATIENT
Start: 2021-02-10 | End: 2021-02-11 | Stop reason: HOSPADM

## 2021-02-10 RX ORDER — SODIUM CHLORIDE 9 MG/ML
INJECTION, SOLUTION INTRAVENOUS PRN
Status: DISCONTINUED | OUTPATIENT
Start: 2021-02-10 | End: 2021-02-11 | Stop reason: HOSPADM

## 2021-02-10 RX ORDER — METOPROLOL SUCCINATE 25 MG/1
25 TABLET, EXTENDED RELEASE ORAL DAILY
Status: DISCONTINUED | OUTPATIENT
Start: 2021-02-10 | End: 2021-02-11 | Stop reason: HOSPADM

## 2021-02-10 RX ORDER — FUROSEMIDE 10 MG/ML
20 INJECTION INTRAMUSCULAR; INTRAVENOUS ONCE
Status: COMPLETED | OUTPATIENT
Start: 2021-02-10 | End: 2021-02-10

## 2021-02-10 RX ORDER — PANTOPRAZOLE SODIUM 40 MG/1
40 TABLET, DELAYED RELEASE ORAL
Qty: 30 TABLET | Refills: 5 | Status: ON HOLD | OUTPATIENT
Start: 2021-02-10 | End: 2021-03-03 | Stop reason: HOSPADM

## 2021-02-10 RX ORDER — LISINOPRIL 5 MG/1
5 TABLET ORAL DAILY
Status: DISCONTINUED | OUTPATIENT
Start: 2021-02-10 | End: 2021-02-11 | Stop reason: HOSPADM

## 2021-02-10 RX ORDER — BUMETANIDE 1 MG/1
1 TABLET ORAL DAILY
Status: DISCONTINUED | OUTPATIENT
Start: 2021-02-10 | End: 2021-02-11 | Stop reason: HOSPADM

## 2021-02-10 RX ADMIN — ACETAMINOPHEN 650 MG: 325 TABLET ORAL at 20:38

## 2021-02-10 RX ADMIN — PAROXETINE HYDROCHLORIDE 20 MG: 20 TABLET, FILM COATED ORAL at 09:28

## 2021-02-10 RX ADMIN — SODIUM CHLORIDE, PRESERVATIVE FREE 10 ML: 5 INJECTION INTRAVENOUS at 05:06

## 2021-02-10 RX ADMIN — DOFETILIDE 250 MCG: 0.25 CAPSULE ORAL at 09:27

## 2021-02-10 RX ADMIN — FERROUS SULFATE TAB 325 MG (65 MG ELEMENTAL FE) 325 MG: 325 (65 FE) TAB at 16:22

## 2021-02-10 RX ADMIN — LISINOPRIL 5 MG: 5 TABLET ORAL at 09:28

## 2021-02-10 RX ADMIN — FUROSEMIDE 20 MG: 10 INJECTION, SOLUTION INTRAVENOUS at 16:22

## 2021-02-10 RX ADMIN — GABAPENTIN 100 MG: 100 CAPSULE ORAL at 09:28

## 2021-02-10 RX ADMIN — BUMETANIDE 1 MG: 1 TABLET ORAL at 09:27

## 2021-02-10 RX ADMIN — ATORVASTATIN CALCIUM 10 MG: 10 TABLET, FILM COATED ORAL at 20:38

## 2021-02-10 RX ADMIN — GABAPENTIN 100 MG: 100 CAPSULE ORAL at 20:38

## 2021-02-10 RX ADMIN — DOFETILIDE 250 MCG: 0.25 CAPSULE ORAL at 20:38

## 2021-02-10 RX ADMIN — PANTOPRAZOLE SODIUM 40 MG: 40 INJECTION, POWDER, FOR SOLUTION INTRAVENOUS at 05:05

## 2021-02-10 RX ADMIN — Medication 10 ML: at 09:28

## 2021-02-10 RX ADMIN — Medication 10 ML: at 20:39

## 2021-02-10 RX ADMIN — FUROSEMIDE 20 MG: 10 INJECTION, SOLUTION INTRAMUSCULAR; INTRAVENOUS at 09:27

## 2021-02-10 RX ADMIN — SODIUM CHLORIDE, PRESERVATIVE FREE 10 ML: 5 INJECTION INTRAVENOUS at 16:22

## 2021-02-10 RX ADMIN — Medication 500 MG: at 09:28

## 2021-02-10 RX ADMIN — FERROUS SULFATE TAB 325 MG (65 MG ELEMENTAL FE) 325 MG: 325 (65 FE) TAB at 09:28

## 2021-02-10 RX ADMIN — ACETAMINOPHEN 650 MG: 325 TABLET ORAL at 09:28

## 2021-02-10 RX ADMIN — PANTOPRAZOLE SODIUM 40 MG: 40 INJECTION, POWDER, FOR SOLUTION INTRAVENOUS at 16:22

## 2021-02-10 RX ADMIN — GABAPENTIN 100 MG: 100 CAPSULE ORAL at 13:38

## 2021-02-10 RX ADMIN — METOPROLOL SUCCINATE 25 MG: 25 TABLET, EXTENDED RELEASE ORAL at 09:28

## 2021-02-10 ASSESSMENT — PAIN SCALES - GENERAL
PAINLEVEL_OUTOF10: 0
PAINLEVEL_OUTOF10: 0

## 2021-02-10 ASSESSMENT — PAIN DESCRIPTION - ORIENTATION: ORIENTATION: RIGHT;LEFT

## 2021-02-10 ASSESSMENT — PAIN DESCRIPTION - DESCRIPTORS: DESCRIPTORS: ACHING;SORE;DISCOMFORT

## 2021-02-10 ASSESSMENT — PAIN DESCRIPTION - PAIN TYPE
TYPE: CHRONIC PAIN
TYPE: ACUTE PAIN

## 2021-02-10 ASSESSMENT — PAIN DESCRIPTION - LOCATION: LOCATION: BACK;HEAD

## 2021-02-10 ASSESSMENT — PAIN DESCRIPTION - PROGRESSION: CLINICAL_PROGRESSION: NOT CHANGED

## 2021-02-10 ASSESSMENT — PAIN DESCRIPTION - ONSET: ONSET: ON-GOING

## 2021-02-10 NOTE — PLAN OF CARE
Problem: Bleeding:  Goal: Will show no signs and symptoms of excessive bleeding  Description: Will show no signs and symptoms of excessive bleeding  Outcome: Met This Shift     Problem: Pain:  Goal: Pain level will decrease  Description: Pain level will decrease  Outcome: Met This Shift  Goal: Control of acute pain  Description: Control of acute pain  Outcome: Met This Shift  Goal: Control of chronic pain  Description: Control of chronic pain  Outcome: Met This Shift Yes

## 2021-02-10 NOTE — CARE COORDINATION
EGD + hemm gastritis. hgb 7.7/28. 1. plan is home , no needs. Await clearance for discharge by medical team.Lyndsey Parada RN,CM.

## 2021-02-11 VITALS
HEIGHT: 66 IN | HEART RATE: 68 BPM | DIASTOLIC BLOOD PRESSURE: 67 MMHG | WEIGHT: 180 LBS | SYSTOLIC BLOOD PRESSURE: 97 MMHG | BODY MASS INDEX: 28.93 KG/M2 | OXYGEN SATURATION: 93 % | TEMPERATURE: 97.7 F | RESPIRATION RATE: 18 BRPM

## 2021-02-11 LAB
ANION GAP SERPL CALCULATED.3IONS-SCNC: 3 MMOL/L (ref 7–16)
BUN BLDV-MCNC: 12 MG/DL (ref 8–23)
CALCIUM SERPL-MCNC: 8.1 MG/DL (ref 8.6–10.2)
CHLORIDE BLD-SCNC: 103 MMOL/L (ref 98–107)
CO2: 34 MMOL/L (ref 22–29)
CREAT SERPL-MCNC: 0.9 MG/DL (ref 0.5–1)
GFR AFRICAN AMERICAN: >60
GFR NON-AFRICAN AMERICAN: >60 ML/MIN/1.73
GLUCOSE BLD-MCNC: 138 MG/DL (ref 74–99)
HCT VFR BLD CALC: 29.4 % (ref 34–48)
HEMOGLOBIN: 8.5 G/DL (ref 11.5–15.5)
INR BLD: 1.2
POTASSIUM SERPL-SCNC: 3.5 MMOL/L (ref 3.5–5)
PROTHROMBIN TIME: 13.6 SEC (ref 9.3–12.4)
SODIUM BLD-SCNC: 140 MMOL/L (ref 132–146)

## 2021-02-11 PROCEDURE — 6370000000 HC RX 637 (ALT 250 FOR IP): Performed by: SURGERY

## 2021-02-11 PROCEDURE — 85014 HEMATOCRIT: CPT

## 2021-02-11 PROCEDURE — 85018 HEMOGLOBIN: CPT

## 2021-02-11 PROCEDURE — 2580000003 HC RX 258: Performed by: SURGERY

## 2021-02-11 PROCEDURE — 6360000002 HC RX W HCPCS: Performed by: SURGERY

## 2021-02-11 PROCEDURE — 2700000000 HC OXYGEN THERAPY PER DAY

## 2021-02-11 PROCEDURE — 6370000000 HC RX 637 (ALT 250 FOR IP): Performed by: INTERNAL MEDICINE

## 2021-02-11 PROCEDURE — 6370000000 HC RX 637 (ALT 250 FOR IP): Performed by: FAMILY MEDICINE

## 2021-02-11 PROCEDURE — 80048 BASIC METABOLIC PNL TOTAL CA: CPT

## 2021-02-11 PROCEDURE — C9113 INJ PANTOPRAZOLE SODIUM, VIA: HCPCS | Performed by: SURGERY

## 2021-02-11 PROCEDURE — 36415 COLL VENOUS BLD VENIPUNCTURE: CPT

## 2021-02-11 PROCEDURE — 85610 PROTHROMBIN TIME: CPT

## 2021-02-11 RX ORDER — POTASSIUM CHLORIDE 20 MEQ/1
20 TABLET, EXTENDED RELEASE ORAL DAILY
Qty: 60 TABLET | Refills: 3 | Status: SHIPPED | OUTPATIENT
Start: 2021-02-11

## 2021-02-11 RX ORDER — PANTOPRAZOLE SODIUM 40 MG/1
40 TABLET, DELAYED RELEASE ORAL
Qty: 30 TABLET | Refills: 3 | Status: SHIPPED | OUTPATIENT
Start: 2021-02-12 | End: 2021-02-16 | Stop reason: SDUPTHER

## 2021-02-11 RX ORDER — PANTOPRAZOLE SODIUM 40 MG/1
40 TABLET, DELAYED RELEASE ORAL
Status: DISCONTINUED | OUTPATIENT
Start: 2021-02-11 | End: 2021-02-11 | Stop reason: HOSPADM

## 2021-02-11 RX ADMIN — SODIUM CHLORIDE, PRESERVATIVE FREE 10 ML: 5 INJECTION INTRAVENOUS at 05:07

## 2021-02-11 RX ADMIN — GABAPENTIN 100 MG: 100 CAPSULE ORAL at 13:32

## 2021-02-11 RX ADMIN — GABAPENTIN 100 MG: 100 CAPSULE ORAL at 09:16

## 2021-02-11 RX ADMIN — DOFETILIDE 250 MCG: 0.25 CAPSULE ORAL at 09:26

## 2021-02-11 RX ADMIN — PAROXETINE HYDROCHLORIDE 20 MG: 20 TABLET, FILM COATED ORAL at 09:26

## 2021-02-11 RX ADMIN — ACETAMINOPHEN 650 MG: 325 TABLET ORAL at 09:25

## 2021-02-11 RX ADMIN — BUMETANIDE 1 MG: 1 TABLET ORAL at 09:16

## 2021-02-11 RX ADMIN — Medication 10 ML: at 09:19

## 2021-02-11 RX ADMIN — PANTOPRAZOLE SODIUM 40 MG: 40 TABLET, DELAYED RELEASE ORAL at 09:17

## 2021-02-11 RX ADMIN — Medication 500 MG: at 09:17

## 2021-02-11 RX ADMIN — METOPROLOL SUCCINATE 25 MG: 25 TABLET, EXTENDED RELEASE ORAL at 09:17

## 2021-02-11 RX ADMIN — PANTOPRAZOLE SODIUM 40 MG: 40 INJECTION, POWDER, FOR SOLUTION INTRAVENOUS at 05:07

## 2021-02-11 ASSESSMENT — PAIN SCALES - GENERAL: PAINLEVEL_OUTOF10: 4

## 2021-02-11 NOTE — DISCHARGE SUMMARY
Discharge Summary     Patient ID:  Haley Zhou  [id number]  70 y.o. 1945 female  Gerson Richmond DO        Admit date: 2/8/2021    Discharge date and time:  2/11/2021    5:00 PM      Activity level: Up in room  Disposition: To home  Condition on Discharge: Fair    Admit Diagnoses: Gastrointestinal bleed    Discharge Diagnoses: Hemorrhagic gastritis unknown cause. Anticoagulation with Xarelto chronic atrial fibrillation    Consults:  IP CONSULT TO GENERAL SURGERY    Procedures: Endoscopy showing hemorrhagic gastritis    Hospital Course: 29-year-old noticed pussy mucousy rectal bleeding for for 5 days. She is under Xarelto therapy was brought in and eventually had endoscopy showing hemorrhagic gastritis. She seems to stabilized off Xarelto and taking pantoprazole. Risk factors include lack of sleep but cannot come up with other causes she is able to ambulate today we will send her home      LABS:  Recent Labs     02/08/21  1319 02/11/21  0400    140   K 4.3 3.5    103   CO2 25 34*   BUN 21 12   CREATININE 0.8 0.9   GLUCOSE 101* 138*   CALCIUM 8.4* 8.1*       Recent Labs     02/08/21  1319 02/08/21  1319 02/10/21  0350 02/10/21  0957 02/11/21  0400   WBC 6.9  --   --   --   --    RBC 3.00*  --   --   --   --    HGB 8.5*   < > 7.7* 8.3* 8.5*   HCT 28.9*   < > 28.1* 29.8* 29.4*   MCV 96.3  --   --   --   --    MCH 28.3  --   --   --   --    MCHC 29.4*  --   --   --   --    RDW 17.4*  --   --   --   --      --   --   --   --    MPV 9.6  --   --   --   --     < > = values in this interval not displayed. No results for input(s): GLUMET in the last 72 hours.       Patient Instructions:   Current Discharge Medication List      START taking these medications    Details   pantoprazole (PROTONIX) 40 MG tablet Take 1 tablet by mouth every morning (before breakfast)  Qty: 30 tablet, Refills: 5         CONTINUE these medications which have NOT CHANGED    Details   TOPROL XL 25 MG extended release tablet Take 1 tablet by mouth daily  Qty: 30 tablet, Refills: 11      rivaroxaban (XARELTO) 20 MG TABS tablet Take 1 tablet by mouth Daily with supper  Qty: 90 tablet, Refills: 3    Associated Diagnoses: Atrial fibrillation, unspecified type (HCC)      dofetilide (TIKOSYN) 250 MCG capsule Take 1 capsule by mouth every 12 hours  Qty: 180 capsule, Refills: 3    Associated Diagnoses: Atrial fibrillation, unspecified type (HCC)      gabapentin (NEURONTIN) 100 MG capsule Take 100 mg by mouth 3 times daily. 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); (HFpEF) heart failure with preserved ejection fraction ([facility] [address] Persistent atrial fibrillation (HCC)      lisinopril (PRINIVIL;ZESTRIL) 5 MG tablet Take 1 tablet by mouth daily  Qty: 30 tablet, Refills: 3      potassium chloride (KLOR-CON M) 20 MEQ extended release tablet Take 1 tablet by mouth daily as needed (take with Lasix)  Qty: 30 tablet, Refills: 5      ferrous sulfate 325 (65 Fe) MG tablet Take 325 mg by mouth 2 times daily      PARoxetine (PAXIL) 20 MG tablet Take 1 tablet by mouth every morning  Qty: 90 tablet, Refills: 1      atorvastatin (LIPITOR) 10 MG tablet Take 10 mg by mouth nightly       ALPRAZolam (XANAX) 0.25 MG tablet Take 0.25 mg by mouth 3 times daily as needed for Anxiety. .      calcium carbonate (OSCAL) 500 MG TABS tablet Take 500 mg by mouth every morning     Associated Diagnoses: CA - breast cancer;  Adenopathy; Abnormal radiograph                 Signed:  Electronically signed by Neeta Farrell MD on 2/11/2021 at 8:09 AM

## 2021-02-11 NOTE — PROGRESS NOTES
Called police to give them patient's name and phone number to take a verbal report over the phone. Officer unable to meet patient in the lobby as she was leaving.
Hgb stable. OK to resume [facility] tomorrow per surgery.   OK for DC per surgery, defer care to primary    Electronically signed by Sheridan Nuñez MD on 2/10/21 at 10:27 AM EST    Seen/examined  Denies bleeding  Denies abdominal pain  Hb increasing  Can follow up with her regular GI doctor  Carlos Olivier for discharge on PPI  [email or web address]
Notified Dr Ginette Wilks of consult via answering service
Notified General Surgery resident Joellen Mcfarland per Dr. John Rosas request.
P Quality Flow/Interdisciplinary Rounds Progress Note        Quality Flow Rounds held on February 11, 2021    Disciplines Attending:  Bedside Nurse, ,  and Nursing Unit Leadership    Sarah Trujillo was admitted on 2/8/2021 12:17 PM    Anticipated Discharge Date:  Expected Discharge Date: 02/10/21    Disposition:    Chidi Score:  Chidi Scale Score: 22    Readmission Risk              Risk of Unplanned Readmission:        17           Discussed patient goal for the day, patient clinical progression, and barriers to discharge.   The following Goal(s) of the Day/Commitment(s) have been identified:  monitor Hgb      Renzo Johnson  February 11, 2021
Plus 1 edema  Assessment//Plan           Hospital Problems           Last Modified POA    Rectal bleeding 2/8/2021 Yes        Assessment & Plan  Acute gi bleed, hemorrhagic gastritis  Acute blood loss anemia  afib  Anxiety  Blood, diurese, monitor hgb  Electronically signed by Autumn Pederson DO on 2/10/21 at 7:14 AM EST

## 2021-02-13 LAB
BLOOD BANK DISPENSE STATUS: NORMAL
BLOOD BANK PRODUCT CODE: NORMAL
BPU ID: NORMAL
DESCRIPTION BLOOD BANK: NORMAL

## 2021-02-15 ASSESSMENT — ENCOUNTER SYMPTOMS: RESPIRATORY NEGATIVE: 1

## 2021-02-15 NOTE — PROGRESS NOTES
Cardiac Electrophysiology Outpatient Progress Note    Melissa Oleary  1945  Date of Service: 2/16/21  Referring Provider/PCP: Melissa Bullard DO   Oncologist: Candido Flower MD  Electrophysiologist: Chavez Aparicio DO  Chief Complaint: Persistent AF. Patient Active Problem List    Diagnosis Date Noted    Atrial fibrillation with RVR ([facility] 04/07/2014     Priority: High     Overview Note:     A). S/P MINE/CV 4/4/14: initial conversion to SR then back into AF   B). Initiation of Sotalol AA drug therapy s/p CV  C). Repeat successful CV post AA drug loading 4/7/14  D). 24-hour Holter(8/3/15): PAF, SR/SB, No AVB, no evidence of chronotropic incompetence. No significant pauses. E). ERAF on sotalol  F) Current Tikosyn AAD therapy    [id number]: 2( age, female)        Chronic anticoagulation 05/15/2014     Priority: Medium     Overview Note:     A).  Xarelto      Breast cancer metastasized to axillary lymph node ([facility] 03/26/2015    Iron deficiency anemia 03/16/2015    Dyspnea on exertion 04/07/2014    Adenopathy 06/19/2012    Malignant neoplasm of breast ([facility] 06/19/2012     Overview Note:     replace inactive diagnosis         Current Outpatient Medications   Medication Sig Dispense Refill    TOPROL XL 25 MG extended release tablet Take 0.5 tablets by mouth daily 30 tablet 11    potassium chloride (KLOR-CON M) 20 MEQ extended release tablet Take 1 tablet by mouth daily 60 tablet 3    pantoprazole (PROTONIX) 40 MG tablet Take 1 tablet by mouth every morning (before breakfast) 30 tablet 5    rivaroxaban (XARELTO) 20 MG TABS tablet Take 1 tablet by mouth Daily with supper 90 tablet 3    dofetilide (TIKOSYN) 250 MCG capsule Take 1 capsule by mouth every 12 hours 180 capsule 3    bumetanide (BUMEX) 1 MG tablet Take 1 tablet by mouth daily (Patient taking differently: Take 1 mg by mouth daily Take additional dose as needed) 90 tablet 3    lisinopril (PRINIVIL;ZESTRIL) 5 MG tablet Take 1 tablet by mouth daily 30 tablet 3    PARoxetine (PAXIL) 20 MG tablet Take 1 tablet by mouth every morning 90 tablet 1    atorvastatin (LIPITOR) 10 MG tablet Take 10 mg by mouth nightly       ALPRAZolam (XANAX) 0.25 MG tablet Take 0.25 mg by mouth 3 times daily as needed for Anxiety. Jeffrey Ni calcium carbonate (OSCAL) 500 MG TABS tablet Take 500 mg by mouth every morning        No current facility-administered medications for this visit. Allergies   Allergen Reactions    Sulfa Antibiotics        11/4/19: Nate Ely presents to the office today with these chronic medical conditions: Persistent AF on Tikosyn AAD therapy, previous Sotalol AAD therapy with ERAF, H/O breast carcinoma, sinus bradycardia and anemia. She was  hospitalized in May for syncope/orthostatic hypotension secondary to poor PO intake and nausea; she was evaluated by Cardiology. She followed up with Dr Allyson Spicer in August with no recurrence. She was doing well with no cardiac complaints. She was treated for shingles in September. She presents today for 6 month office follow-up. She remains on Tikosyn with a stable CrCl and QTc. We discussed maintaining adequate hydration, not skipping meals, and limiting caffeine. She remains on Xarelto with no reported bleeding issues. She denies any recurrent heart failure symptoms and appears euvolemic today. She denies palpitations, angina,  dyspnea,  orthopnea and PND.     06/04/20--TV: Nate Ely gives verbal consent for a telephone visit from her home via my office. Since her last OV  she has been feeling very well. She denies any complaints from a cardiac or EP perspective. She denies any chest pain, shortness of breath, or PND. She denies any palpitations or feelings of rapid heart rhythms. Her most recent blood work from the end of May is stable. 1/8/2021 Jennifer [address]do presents for in-office follow-up. She remains on Tikosyn for AF suppression.  Her most recent CrCl is 58.60 from 9/16/20 with a stable QTc. She remains on Xarelto for stroke-risk reduction with no reported bleeding or missed doses. She continues to follow with [facility] for history of breast CA. She presents in AT with an increased VHR (123 bpm) today. She states she felt she was out of rhythm a few days ago with noted palpitations, fatigue and occasional dizziness. Her last CV was in 2016. She does have PAF and usually \"flips back into rhythm. 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 She worked last night from 9p-7:30a and has not slept. She has worked all week and admits to not properly eating or maintaining hydration. She is adamant about not needing to go to the hospital. We discussed the need for evaluation should her symptoms worsen; she understands. She denies any HF symptoms and appear euvolemic. She currently denies angina, dyspnea, syncope, orthopnea and PND.     02/16/2021 Gabriela Damian gives verbal consent for a virtual telephone visit from her  home via my work office. She underwent successful CV of atrial tachycardia to sinus rhythm on 1/14/2021. She thought she may have been out-of-rhythm post procedure but did not make it in for an EKG. She is maintained on Tikosyn for arrhythmia suppression. An EKG on 1/22/2021 showed sinus bradycardia with a HR 53 bpm. On 2/8/2021 she presented to Dr Mary Jo Waldron office with complaint of sudden onset maroon stools the previous Saturday into Sunday. Her Xarelto was held. On 2/9/2021 she underwent an EGD showing hemorrhagic gastritis with no active bleeding with plan for PPI and resumption of [address] in 24 hours. Today she is feeling well with no recurrent AF or significant palpitations. She has resumed her [address] with no issues. She denies angina, dyspnea, syncope, orthopnea or PND. Review of Systems   Respiratory: Negative. Cardiovascular: Negative. Gastrointestinal:        H/O UGI bleed 2/9/2021   Neurological: Negative. All other systems reviewed and are negative. PHYSICAL EXAM:  Limited due to telephone/virtual visit  There were no vitals filed for this visit. Patient reported: Height 5'6\"; weight 168#  Constitutional: Oriented to person, place, and time. Well-developed and cooperative. Psychiatric: Normal mood and affect. Thought content normal.    2D echocardiogram 1/16/19  Reading Physician Reading Date Result Priority   Padmini Montana MD 1/17/2019       Narrative     Transthoracic Echocardiography Report (TTE)     Demographics      Patient Name   Ardia Siemens Gender            Female      Medical Record  [id number]     Room Number       0415   Number      Account #       [de-identified]    Procedure Date    01/16/2019      Corporate ID                 Ordering         Silvia Lou MD                                Physician      Accession       [id number]    Referring   Number                       Physician      Date of Birth   1945   Sonographer       Vick Alston [facility]      Age             73 year(s)   Interpreting     [address]                                Physician         Physician Cardiology                                                 Reinier Morales MD                                   Any Other     Procedure    Type of Study      TTE procedure:Echo Complete W/ Dop & Color Flow.     Procedure Date  Date: 01/16/2019 Start: 09:06 AM    Study Location: Echo Lab  Technical Quality: Adequate visualization    Indications:Atrial fibrillation.     Patient Status: Routine    Height: 66 inches Weight: 171 pounds BSA: 1.87 m^2 BMI: 27.6 kg/m^2    BP: 95/60 mmHg     Findings      Left Ventricle   Normal left ventricle size.   Normal left ventricle wall thickness.   No wall motion abnormalities.   Ejection fraction is visually estimated at 60-65%.      Right Ventricle   Normal right ventricular size and function.      Left Atrium   The left atrium is moderately dilated.   Probable atrial fibrillation      Right Atrium   Markedly enlarged right atrium size.      Mitral Valve   Mild mitral annular calcification.   Mild centrally directed mitral regurgitation.      Tricuspid Valve   The tricuspid valve appears structurally normal.   Moderate tricuspid regurgitation.    RVSP is 68 mmHg.   TR velocity = 4.1 m/s   Pulmonary hypertension is severe .      Aortic Valve   Aortic valve opens well.   The aortic valve is trileaflet.   The aortic valve appears mildly sclerotic.      Pericardial Effusion   No evidence of pericardial effusion.      Aorta   Aortic root dimension within normal limits.      Conclusions      Summary   Aortic valve opens well.   No wall motion abnormalities.   Ejection fraction is visually estimated at 60-65%.   The left atrium is moderately dilated.   Probable atrial fibrillation   Mild centrally directed mitral regurgitation.   The aortic valve appears mildly sclerotic.   No evidence of pericardial effusion.      Signature      ----------------------------------------------------------------   Electronically signed by Bob Villarreal MD(Interpreting   physician) on 01/17/2019 08:23 AM   ----------------------------------------------------------------     M-Mode/2D Measurements & Calculations      LV Diastolic      LV Systolic Dimension: 3.4 cm      LA Dimension: 5.1 cm   Dimension: 4.5 cm LV Volume Diastolic: 85.1 ml   LV ZQ:24.0 %      LV Volume Systolic: 31.8 ml   LV PW Diastolic:  LV EDV/LV EDV Index: 91.8 ml/49   1.3 cm            ml/m^2LV ESV/LV ESV Index: 69.1    RV Diastolic   LV PW Systolic:   RH/70KU/ m^2                       Dimension: 3.4 cm   1.4 cm            EF Calculated: 48.4 %   Septum Diastolic: LV Mass Index: 008 l/min*m^2       Ascending Aorta: 2.7   1.3 cm            LV Length: 6.1 cm                  cm   Septum Systolic:                                     LA volume/Index: 80.7   1.9 cm            LVOT: 2 cm                         ml /43ml/m^2                                                        RA Area: 20.5 cm^2   LV Mass: 222.57 g     Doppler Measurements & Calculations      MV Peak E-Wave: 1.22 AV Peak Velocity:     LVOT Peak Velocity: 1.13 m/s   m/s                  1.71 m/s              LVOT Mean Velocity: 0.76 m/s                        AV Peak Gradient:     LVOT Peak Gradient: 5.1   MV Peak Gradient:    11.71 mmHg            mmHgLVOT Mean Gradient: 2.6   5.9 mmHg             AV Mean Velocity:     mmHg   MV Mean Gradient:    1.15 m/s              Estimated RVSP: 70.6 mmHg   3.3 mmHg             AV Mean Gradient: 6   Estimated RAP:3 mmHg   MV Mean Velocity:    mmHg   0.87 m/s             AV VTI: 25.5 cm   MV Deceleration      AV Area               TR Velocity:4.11 m/s   Time: 214.8 msec     (Continuity):1.97     TR Gradient:67.63 mmHg                        cm^2                  PV Peak Velocity: 0.82 m/s   MV Area                                    PV Peak Gradient: 2.7 mmHg   (continuity): 2 cm^2 LVOT VTI: 16 cm       PV Mean Velocity: 0.55 m/s                                              PV Mean Gradient: 1.4 mmHg                        Estimated PASP: 70.63   MR Velocity: 4.67    mmHg                  CO ED Velocity: 1.86 m/s   m/s   MR VTI: 124.4 cm     [email or web address] [id number]     ECG--11/4/19: Sinus bradycardia, HR 57 bpm, PAC, QTc 480 ms  ECG 1/8/2021: AT with variable conduction,   bpm, QTc 452 ms  EKG 1/22/2021: Sinus bradycardia, HR 53 bpm, QTc 454 ms     I have independently reviewed all of the ECGs as per above. I have reviewed all progress notes & records from the time of the patient's last office visit up to present. Impression:     1.  Persistent atrial fibrillation with RVR   - history of prior sotalol AAD therapy with ERAF.  - Xarelto OAC.  - [id number]-BWZp score 2.  - s/p CV on 9/19/16  - Tikosyn initiation 11/14/16  - s/p CV 11/16/16  - recurrent PAF 1/16/19 in setting of acute decompensated HF- spontaneous conversion to SR  -1/7/2021: presents in AT with variable conduction ( bpm)  -Toprol XL 25 mg QD to aid in HR control (monitor HRs)  - successful CV of atrial tachycardia to sinus rhythm on 1/14/2021  - 2/16/2021: Decrease Toprol XL to 12.5 mg QD    2. 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 Normal coronary arteries. Global LV hypokinesis, EF 40%. 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  - Sotalol d/c'd - Toprol XL resumed  - Repeat echocardiogram(May 2014): EF 55%. - Repeat echocardiogram (6/24/15): EF >55%, LVPW(d) 1 cm and IVS(d) 1 cm.    - Repeat echocardiogram 1/16/19: LVEF 50-55%; LVPWd 1.3 cm; IVSd 1.3 cm.     3. Breast Ca s/p R mastectomy/CTX--2009. 4. UGI bleed  - 2/9/2021 EGD: hemorrhagic gastritis with no active bleeding with plan for PP  - resumption of OAC 2/14/2021  - no recurrent bleeding      Summary/Plan:        Ms Richelle Irvin is feeling well from an EP perspective. She remains in sinus rhythm on Tikosyn post-CV 1/14/2021; her QTc was stable 1/22/2021. She had an UGI bleed on 2/9/2021 holding Xarelto and underwent an EGD. Xarelto was resumed over the weekend with no recurrent bleeding issues. She is due to follow-up with Dr Umair Tucker. 1. Decrease Toprol XL from 25 mg QD to 12.5 mg QD due to baseline bradycardia. If she remains in sinus rhythm we will plan to discontinue Toprol XL at her next office follow-up. 2. No other changes were made in her medications today. 3. Office follow-up in one month. 4. She was asked to call the office with concerns prior to follow-up. Thank you for allowing me to participate in their care. I have spent a total of 20 minutes with the patient via telephone/virtual follow-up reviewing the above stated recommendations.  And a total of >50% of that time involved face-to-face time providing counseling and or coordination of care with the other providers    JOSE Nieves, [address] [facility]    CC: Dr Rubén Sanders      Due to this being a TeleHealth encounter, evaluation of organ systems is limited. Pursuant to the emergency declaration under the [address] [place] [address], [facility] waiver authority and the [employer] Act, this Virtual  Visit was conducted, with patient's consent, to reduce the patient's risk of exposure to COVID-19 and provide continuity of care for an established patient. Services were provided through a video synchronous discussion virtually to substitute for in-person clinic visit.

## 2021-02-16 ENCOUNTER — VIRTUAL VISIT (OUTPATIENT)
Dept: NON INVASIVE DIAGNOSTICS | Age: 76
End: 2021-02-16
Payer: MEDICARE

## 2021-02-16 DIAGNOSIS — I48.91 ATRIAL FIBRILLATION WITH RVR (HCC): ICD-10-CM

## 2021-02-16 DIAGNOSIS — Z79.01 CHRONIC ANTICOAGULATION: ICD-10-CM

## 2021-02-16 DIAGNOSIS — I47.1 ATRIAL TACHYCARDIA (HCC): ICD-10-CM

## 2021-02-16 PROCEDURE — 1111F DSCHRG MED/CURRENT MED MERGE: CPT | Performed by: NURSE PRACTITIONER

## 2021-02-16 PROCEDURE — 3017F COLORECTAL CA SCREEN DOC REV: CPT | Performed by: NURSE PRACTITIONER

## 2021-02-16 PROCEDURE — 4040F PNEUMOC VAC/ADMIN/RCVD: CPT | Performed by: NURSE PRACTITIONER

## 2021-02-16 PROCEDURE — 1036F TOBACCO NON-USER: CPT | Performed by: NURSE PRACTITIONER

## 2021-02-16 PROCEDURE — G8417 CALC BMI ABV UP PARAM F/U: HCPCS | Performed by: NURSE PRACTITIONER

## 2021-02-16 PROCEDURE — 1123F ACP DISCUSS/DSCN MKR DOCD: CPT | Performed by: NURSE PRACTITIONER

## 2021-02-16 PROCEDURE — 1090F PRES/ABSN URINE INCON ASSESS: CPT | Performed by: NURSE PRACTITIONER

## 2021-02-16 PROCEDURE — G8399 PT W/DXA RESULTS DOCUMENT: HCPCS | Performed by: NURSE PRACTITIONER

## 2021-02-16 PROCEDURE — 99213 OFFICE O/P EST LOW 20 MIN: CPT | Performed by: NURSE PRACTITIONER

## 2021-02-16 PROCEDURE — G8484 FLU IMMUNIZE NO ADMIN: HCPCS | Performed by: NURSE PRACTITIONER

## 2021-02-16 PROCEDURE — G8427 DOCREV CUR MEDS BY ELIG CLIN: HCPCS | Performed by: NURSE PRACTITIONER

## 2021-02-16 RX ORDER — METOPROLOL SUCCINATE 25 MG
12.5 TABLET, EXTENDED RELEASE 24 HR ORAL DAILY
Qty: 30 TABLET | Refills: 11
Start: 2021-02-16 | End: 2021-02-26 | Stop reason: ALTCHOICE

## 2021-02-26 ENCOUNTER — HOSPITAL ENCOUNTER (INPATIENT)
Age: 76
LOS: 5 days | Discharge: HOME | DRG: 377 | End: 2021-03-03
Attending: EMERGENCY MEDICINE | Admitting: FAMILY MEDICINE
Payer: MEDICARE

## 2021-02-26 ENCOUNTER — APPOINTMENT (OUTPATIENT)
Dept: GENERAL RADIOLOGY | Age: 76
DRG: 377 | End: 2021-02-26
Payer: MEDICARE

## 2021-02-26 DIAGNOSIS — K92.2 GASTROINTESTINAL HEMORRHAGE, UNSPECIFIED GASTROINTESTINAL HEMORRHAGE TYPE: Primary | ICD-10-CM

## 2021-02-26 DIAGNOSIS — D64.9 ANEMIA, UNSPECIFIED TYPE: ICD-10-CM

## 2021-02-26 LAB
ABO/RH: NORMAL
ALBUMIN SERPL-MCNC: 3 G/DL (ref 3.5–5.2)
ALP BLD-CCNC: 46 U/L (ref 35–104)
ALT SERPL-CCNC: 7 U/L (ref 0–32)
ANION GAP SERPL CALCULATED.3IONS-SCNC: 8 MMOL/L (ref 7–16)
ANISOCYTOSIS: ABNORMAL
ANTIBODY SCREEN: NORMAL
AST SERPL-CCNC: 13 U/L (ref 0–31)
BACTERIA: ABNORMAL /HPF
BASOPHILS ABSOLUTE: 0.01 E9/L (ref 0–0.2)
BASOPHILS RELATIVE PERCENT: 0.1 % (ref 0–2)
BILIRUB SERPL-MCNC: 0.7 MG/DL (ref 0–1.2)
BILIRUBIN DIRECT: 0.4 MG/DL (ref 0–0.3)
BILIRUBIN URINE: NEGATIVE
BILIRUBIN, INDIRECT: 0.3 MG/DL (ref 0–1)
BLOOD, URINE: ABNORMAL
BUN BLDV-MCNC: 27 MG/DL (ref 8–23)
CALCIUM SERPL-MCNC: 8.1 MG/DL (ref 8.6–10.2)
CHLORIDE BLD-SCNC: 101 MMOL/L (ref 98–107)
CLARITY: CLEAR
CO2: 30 MMOL/L (ref 22–29)
COLOR: YELLOW
CREAT SERPL-MCNC: 1.1 MG/DL (ref 0.5–1)
CRYSTALS, UA: ABNORMAL /HPF
EKG ATRIAL RATE: 182 BPM
EKG Q-T INTERVAL: 366 MS
EKG QRS DURATION: 82 MS
EKG QTC CALCULATION (BAZETT): 457 MS
EKG R AXIS: 56 DEGREES
EKG T AXIS: -134 DEGREES
EKG VENTRICULAR RATE: 94 BPM
EOSINOPHILS ABSOLUTE: 0 E9/L (ref 0.05–0.5)
EOSINOPHILS RELATIVE PERCENT: 0 % (ref 0–6)
GFR AFRICAN AMERICAN: 59
GFR NON-AFRICAN AMERICAN: 49 ML/MIN/1.73
GLUCOSE BLD-MCNC: 172 MG/DL (ref 74–99)
GLUCOSE URINE: NEGATIVE MG/DL
HCT VFR BLD CALC: 15.7 % (ref 34–48)
HEMOGLOBIN: 4.3 G/DL (ref 11.5–15.5)
HYPOCHROMIA: ABNORMAL
IMMATURE GRANULOCYTES #: 0.04 E9/L
IMMATURE GRANULOCYTES %: 0.6 % (ref 0–5)
INR BLD: 2.7
KETONES, URINE: NEGATIVE MG/DL
LACTIC ACID: 1.2 MMOL/L (ref 0.5–2.2)
LEUKOCYTE ESTERASE, URINE: NEGATIVE
LYMPHOCYTES ABSOLUTE: 0.6 E9/L (ref 1.5–4)
LYMPHOCYTES RELATIVE PERCENT: 8.5 % (ref 20–42)
MAGNESIUM: 1.8 MG/DL (ref 1.6–2.6)
MCH RBC QN AUTO: 24.9 PG (ref 26–35)
MCHC RBC AUTO-ENTMCNC: 27.4 % (ref 32–34.5)
MCV RBC AUTO: 90.8 FL (ref 80–99.9)
MONOCYTES ABSOLUTE: 0.38 E9/L (ref 0.1–0.95)
MONOCYTES RELATIVE PERCENT: 5.4 % (ref 2–12)
NEUTROPHILS ABSOLUTE: 6.05 E9/L (ref 1.8–7.3)
NEUTROPHILS RELATIVE PERCENT: 85.4 % (ref 43–80)
NITRITE, URINE: NEGATIVE
OVALOCYTES: ABNORMAL
PDW BLD-RTO: 18.4 FL (ref 11.5–15)
PH UA: 5 (ref 5–9)
PLATELET # BLD: 210 E9/L (ref 130–450)
PMV BLD AUTO: 9.1 FL (ref 7–12)
POIKILOCYTES: ABNORMAL
POLYCHROMASIA: ABNORMAL
POTASSIUM REFLEX MAGNESIUM: 3.4 MMOL/L (ref 3.5–5)
PRO-BNP: 2977 PG/ML (ref 0–125)
PROTEIN UA: NEGATIVE MG/DL
PROTHROMBIN TIME: 32.2 SEC (ref 9.3–12.4)
RBC # BLD: 1.73 E12/L (ref 3.5–5.5)
RBC UA: ABNORMAL /HPF (ref 0–2)
SODIUM BLD-SCNC: 139 MMOL/L (ref 132–146)
SPECIFIC GRAVITY UA: 1.02 (ref 1–1.03)
STOMATOCYTES: ABNORMAL
TOTAL PROTEIN: 5.1 G/DL (ref 6.4–8.3)
TROPONIN: <0.01 NG/ML (ref 0–0.03)
UROBILINOGEN, URINE: 0.2 E.U./DL
WBC # BLD: 7.1 E9/L (ref 4.5–11.5)
WBC UA: ABNORMAL /HPF (ref 0–5)

## 2021-02-26 PROCEDURE — 96374 THER/PROPH/DIAG INJ IV PUSH: CPT

## 2021-02-26 PROCEDURE — 71045 X-RAY EXAM CHEST 1 VIEW: CPT

## 2021-02-26 PROCEDURE — 6360000002 HC RX W HCPCS: Performed by: STUDENT IN AN ORGANIZED HEALTH CARE EDUCATION/TRAINING PROGRAM

## 2021-02-26 PROCEDURE — C9113 INJ PANTOPRAZOLE SODIUM, VIA: HCPCS | Performed by: INTERNAL MEDICINE

## 2021-02-26 PROCEDURE — 80076 HEPATIC FUNCTION PANEL: CPT

## 2021-02-26 PROCEDURE — C9113 INJ PANTOPRAZOLE SODIUM, VIA: HCPCS | Performed by: STUDENT IN AN ORGANIZED HEALTH CARE EDUCATION/TRAINING PROGRAM

## 2021-02-26 PROCEDURE — 81001 URINALYSIS AUTO W/SCOPE: CPT

## 2021-02-26 PROCEDURE — 2580000003 HC RX 258: Performed by: STUDENT IN AN ORGANIZED HEALTH CARE EDUCATION/TRAINING PROGRAM

## 2021-02-26 PROCEDURE — 86923 COMPATIBILITY TEST ELECTRIC: CPT

## 2021-02-26 PROCEDURE — 80048 BASIC METABOLIC PNL TOTAL CA: CPT

## 2021-02-26 PROCEDURE — 6360000002 HC RX W HCPCS: Performed by: INTERNAL MEDICINE

## 2021-02-26 PROCEDURE — 87081 CULTURE SCREEN ONLY: CPT

## 2021-02-26 PROCEDURE — P9016 RBC LEUKOCYTES REDUCED: HCPCS

## 2021-02-26 PROCEDURE — 2000000000 HC ICU R&B

## 2021-02-26 PROCEDURE — 36430 TRANSFUSION BLD/BLD COMPNT: CPT

## 2021-02-26 PROCEDURE — 2580000003 HC RX 258: Performed by: INTERNAL MEDICINE

## 2021-02-26 PROCEDURE — 93005 ELECTROCARDIOGRAM TRACING: CPT | Performed by: STUDENT IN AN ORGANIZED HEALTH CARE EDUCATION/TRAINING PROGRAM

## 2021-02-26 PROCEDURE — 85610 PROTHROMBIN TIME: CPT

## 2021-02-26 PROCEDURE — 83605 ASSAY OF LACTIC ACID: CPT

## 2021-02-26 PROCEDURE — 83735 ASSAY OF MAGNESIUM: CPT

## 2021-02-26 PROCEDURE — 99285 EMERGENCY DEPT VISIT HI MDM: CPT

## 2021-02-26 PROCEDURE — 86900 BLOOD TYPING SEROLOGIC ABO: CPT

## 2021-02-26 PROCEDURE — 85025 COMPLETE CBC W/AUTO DIFF WBC: CPT

## 2021-02-26 PROCEDURE — 84484 ASSAY OF TROPONIN QUANT: CPT

## 2021-02-26 PROCEDURE — 86850 RBC ANTIBODY SCREEN: CPT

## 2021-02-26 PROCEDURE — 86901 BLOOD TYPING SEROLOGIC RH(D): CPT

## 2021-02-26 PROCEDURE — 93010 ELECTROCARDIOGRAM REPORT: CPT | Performed by: INTERNAL MEDICINE

## 2021-02-26 PROCEDURE — 83880 ASSAY OF NATRIURETIC PEPTIDE: CPT

## 2021-02-26 RX ORDER — 0.9 % SODIUM CHLORIDE 0.9 %
500 INTRAVENOUS SOLUTION INTRAVENOUS ONCE
Status: COMPLETED | OUTPATIENT
Start: 2021-02-26 | End: 2021-02-26

## 2021-02-26 RX ORDER — SODIUM CHLORIDE 9 MG/ML
10 INJECTION INTRAVENOUS 2 TIMES DAILY
Status: DISCONTINUED | OUTPATIENT
Start: 2021-02-26 | End: 2021-03-01

## 2021-02-26 RX ORDER — ZOSTER VACCINE RECOMBINANT, ADJUVANTED 50 MCG/0.5
0.5 KIT INTRAMUSCULAR ONCE
Status: ON HOLD | COMMUNITY
End: 2021-03-03 | Stop reason: HOSPADM

## 2021-02-26 RX ORDER — PANTOPRAZOLE SODIUM 40 MG/10ML
80 INJECTION, POWDER, LYOPHILIZED, FOR SOLUTION INTRAVENOUS ONCE
Status: COMPLETED | OUTPATIENT
Start: 2021-02-26 | End: 2021-02-26

## 2021-02-26 RX ORDER — PANTOPRAZOLE SODIUM 40 MG/10ML
40 INJECTION, POWDER, LYOPHILIZED, FOR SOLUTION INTRAVENOUS 2 TIMES DAILY
Status: DISCONTINUED | OUTPATIENT
Start: 2021-02-26 | End: 2021-02-26 | Stop reason: SDUPTHER

## 2021-02-26 RX ORDER — PANTOPRAZOLE SODIUM 40 MG/10ML
40 INJECTION, POWDER, LYOPHILIZED, FOR SOLUTION INTRAVENOUS 2 TIMES DAILY
Status: DISCONTINUED | OUTPATIENT
Start: 2021-02-26 | End: 2021-03-01

## 2021-02-26 RX ORDER — BUMETANIDE 1 MG/1
1 TABLET ORAL PRN
Status: ON HOLD | COMMUNITY
End: 2021-10-28 | Stop reason: SDUPTHER

## 2021-02-26 RX ORDER — PANTOPRAZOLE SODIUM 40 MG/1
40 TABLET, DELAYED RELEASE ORAL
Status: DISCONTINUED | OUTPATIENT
Start: 2021-02-27 | End: 2021-02-26

## 2021-02-26 RX ORDER — SODIUM CHLORIDE 9 MG/ML
INJECTION, SOLUTION INTRAVENOUS PRN
Status: DISCONTINUED | OUTPATIENT
Start: 2021-02-26 | End: 2021-02-27

## 2021-02-26 RX ADMIN — PANTOPRAZOLE SODIUM 40 MG: 40 INJECTION, POWDER, FOR SOLUTION INTRAVENOUS at 20:51

## 2021-02-26 RX ADMIN — PANTOPRAZOLE SODIUM 80 MG: 40 INJECTION, POWDER, FOR SOLUTION INTRAVENOUS at 12:28

## 2021-02-26 RX ADMIN — SODIUM CHLORIDE 500 ML: 9 INJECTION, SOLUTION INTRAVENOUS at 12:23

## 2021-02-26 RX ADMIN — SODIUM CHLORIDE, PRESERVATIVE FREE 10 ML: 5 INJECTION INTRAVENOUS at 20:51

## 2021-02-26 ASSESSMENT — ENCOUNTER SYMPTOMS
RHINORRHEA: 0
SHORTNESS OF BREATH: 0
DIARRHEA: 0
BLOOD IN STOOL: 1
COUGH: 0
VOMITING: 0
BACK PAIN: 0
NAUSEA: 0
SORE THROAT: 0
ABDOMINAL PAIN: 0

## 2021-02-26 NOTE — CONSULTS
[facility]  Department of Critical Care   MICU H&P      Patient:  Diana Navarro 70 y.o. female  MRN: [id number]     Date of Service: 2/26/2021    Allergy: Sulfa antibiotics      History of Present Illness   The patient is a 70 y.o. female with history of atrial fibrillation on Xarelto (stopped yesterday), breast cancer x10 years ago, no longer on chemotherapy but with port present on the left; and recent GI bleed with admission from 2/8 to 2/11, s/p EGD on 2/9 which showed hemorrhagic gastritis without active bleeding, who presented to the ED on 2/26 due to bright red blood per rectum x1 week. According to the patient she has been having 3-4 bloody bowel movements daily for the past week; she admits to fatigue, dizziness, lightheadedness. Denies any abdominal pain, chest pain, nausea, vomiting, or diarrhea. She was noted to be hypotensive in the ED, systolic blood pressure in the low 80s, she was given 1 500 cc fluid bolus, 80 mg IV Protonix; hemoglobin was found to be 4.3, x2 unit PRBCs thus far being transfused. Platelets WNLs. She was in no acute distress. UA was unremarkable. INR 2.7. CXR showed cardiomegaly, no acute abnormalities. Blood pressure improving with resuscitation, systolic in the 15V; patient will be seen by general surgery during this encounter. She is accepted for admission to the ICU due to hemorrhagic shock.       ROS: Denies Fever/chills/CP/SOB/N/V/D/C/Dysuria/hematuria; endorses bloody stools, fatigue, lightheadedness      Past Medical History:      Diagnosis Date    AF (atrial fibrillation) (HCC)     Anxiety     Atrial tachycardia ([facility] 01/14/2021    Breast cancer ([facility]     right chest wall involvement    Depression     Hyperlipidemia     Lymphadenopathy     Osteoporosis     Radiation     Status post chemotherapy     neoadjuvant chemo with AC, Taxol, carboplatin, and Herceptin       Past Surgical History:        Procedure Laterality Date    BRONCHOSCOPY  07/12/2012    ebus    CARDIAC CATHETERIZATION Right 04/03/2014    Dr. Uche Walls  11/16/2016    CARDIOVERSION  01/14/2021    Successful    (Dr. Kait Vivar)   [address]      RT - 2010    MASTECTOMY Right 02/06/2009    TONSILLECTOMY      TUNNELED VENOUS PORT PLACEMENT      UPPER GASTROINTESTINAL ENDOSCOPY N/A 2/9/2021    EGD ESOPHAGOGASTRODUODENOSCOPY performed by Aleja Ordaz MD at [facility] ENDOSCOPY       Medications Prior to Admission:    Prior to Admission medications    Medication Sig Start Date End Date Taking?  Authorizing Provider   bumetanide (BUMEX) 1 MG tablet Take 1 mg by mouth as needed Indications: May Take Addional Dose if Needed   Yes Historical Provider, MD   Influenza Vac High-Dose Quad (FLUZONE) 0.7 ML ADELA injection Inject 0.7 mLs into the muscle once *GIVEN AT PCP*   Yes Historical Provider, MD   zoster recombinant adjuvanted vaccine (SHINGRIX) 50 MCG/0.5ML SUSR injection Inject 0.5 mLs into the muscle once *GIVEN AT [address] [place] [address]*   Yes Historical Provider, MD   OXYGEN Inhale 2 L into the lungs nightly   Yes Historical Provider, MD   potassium chloride (KLOR-CON M) 20 MEQ extended release tablet Take 1 tablet by mouth daily 2/11/21  Yes Rosa Elena Wong MD   pantoprazole (PROTONIX) 40 MG tablet Take 1 tablet by mouth every morning (before breakfast) 2/10/21  Yes Aleja Ordaz MD   rivaroxaban (XARELTO) 20 MG TABS tablet Take 1 tablet by mouth Daily with supper 6/2/20  Yes Igor Loss, DO   dofetilide (TIKOSYN) 250 MCG capsule Take 1 capsule by mouth every 12 hours 6/2/20  Yes Igor Loss, DO   bumetanide (BUMEX) 1 MG tablet Take 1 tablet by mouth daily 2/22/19  Yes GIOVANNI Koroma   lisinopril (PRINIVIL;ZESTRIL) 5 MG tablet Take 1 tablet by mouth daily 1/19/19  Yes Bere Beasley,    PARoxetine (PAXIL) 20 MG tablet Take 1 tablet by mouth every morning 10/4/17  Yes Lizz Ndiaye MD   atorvastatin (LIPITOR) 10 MG tablet Take 10 mg by mouth nightly Yes Historical Provider, MD   ALPRAZolam (XANAX) 0.25 MG tablet Take 0.25 mg by mouth daily as needed for Anxiety. 3/24/16  Yes Historical Provider, MD   calcium carbonate (OSCAL) 500 MG TABS tablet Take 500 mg by mouth every morning    Yes Historical Provider, MD       Allergies:  Sulfa antibiotics    Social History:   TOBACCO:   reports that she has never smoked. She has never used smokeless tobacco.  ETOH:   reports no history of alcohol use. Family History:       Problem Relation Age of Onset    Cancer Mother         female organs    Cancer Father         bowel?  Breast Cancer Maternal Aunt        REVIEW OF SYSTEMS:    Review of Systems   Constitutional: Positive for fatigue. Negative for fever. HENT: Negative for rhinorrhea and sore throat. Respiratory: Negative for cough and shortness of breath. Cardiovascular: Negative for chest pain and palpitations. Gastrointestinal: Positive for blood in stool. Negative for abdominal pain, nausea and vomiting. Genitourinary: Negative for dysuria and hematuria. Musculoskeletal: Negative for back pain and neck pain. Neurological: Positive for light-headedness. Objective     VS: BP (!) 96/48   Pulse 117   Temp 98.2 °F (36.8 °C)   Resp 16   Ht 5' 6\" (1.676 m)   Wt 165 lb (74.8 kg)   SpO2 100%   BMI 26.63 kg/m²           I & O - 24hr:     Intake/Output Summary (Last 24 hours) at 2/26/2021 1858  Last data filed at 2/26/2021 1717  Gross per 24 hour   Intake 685 ml   Output    Net 685 ml       Physical Exam:  · General Appearance: alert, appears stated age, cooperative and generalized pallor  · Neck: supple, symmetrical, trachea midline  · Lung: clear to auscultation bilaterally  · Heart: irregularly irregular rhythm  · Abdomen: soft, non-tender; bowel sounds normal; no masses,  no organomegaly  · Extremities:  extremities normal, atraumatic, no cyanosis or edema  · Musculoskeletal: No joint swelling, no muscle tenderness.  ROM normal in vascularity is normal.  Lungs are clear. Neither costophrenic angle is blunted. Cardiomegaly. Lines     site day    Art line   None    TLC None    PICC None    Hemoaccess Left venous port, pre-existing, accessed 2/26 Pre-existing; accessed 2/26   Oxygen:    · nasal canula at 2L/min         Medications     Infusions: (Fluid, Sedation, Vasopressors)  Vasopressors   As needed  Sedation   None    Nutrition:   CLD    ATB:   Antibiotics  Days   none              Skin issues: none    Patient currently has   DVT prophylaxis/ GI prophylaxis        EKG: Atrial fibrillation with PVCs, without RVR    Resident's Assessment and Plan     1.) GI Bleed, H/O Hemorrhagic Gastritis   - s/p EGD 2/9/2021 with Dr. Flor Estrada   2.) Acute on Chronic Anemia - presentation hemoglobin 4.3 g/dL  3.) Atrial Fibrillation   4.) Anxiety   5.0 Acute Kidney Injury     1. Neuro   AAOx3   Moving all extremities   No complaints or abnormalities    2. Cardiovascular   History atrial fibrillation with RVR, chronic anticoagulation with Xarelto which was stopped yesterday   Hold anticoagulants   On Tikosyn for several years now, she is in atrial fibrillation today   Troponin negative in ED, proBNP 2977 (elevated in past, unsure of any recent baselines)   Echocardiogram in 2019 showing EF 65%   Monitor for any fluid overload   Monitor HR    3. Respiratory   CXR shows cardiomegaly, otherwise unremarkable   Hx CHF, on bumex 1mg daily   Lung sounds CTA b/l today -- hypotensive at this time, PRBC resuscitation, cont to monitor BP and respiratory status, diurese as necessary   On 2L NC O2 -- hgb 4.3, for increased O2 delivery; no respiratory distress    4. GI   Status post EGD on 2/9 which showed hemorrhagic gastritis without active bleeding at the time;   GI bleed, hemoglobin 4.9, hypotensive   Resuscitate PRBCs, repeat hemoglobin   Initiate Protonix drip   General surgery consult -- possibly for EGD tomorrow   CLD per GS    5. Renal   Creatinine slightly above baseline, most recently 0.9, today 1.1; Daily BMPs   Potassium 3.4, replete as needed    6.   Heme-onc  · GI bleed leading to anemia requiring transfusion, hemoglobin 4.3   · X2 units PRBCs thus far  · Hold Xarelto  · Monitor H/H -- consider 1:1:1 (FFP, plts) if further transfusions are needed       PT/OT evaluation: defer for now  DVT prophylaxis/ GI prophylaxis: SCDs/protonix  Disposition: MICU     Pankaj Magana DO, PGY-1   2/26/21; 9803  Attending physician: Dr. Bryan Salas MD

## 2021-02-26 NOTE — ED PROVIDER NOTES
Conjunctiva/sclera: Conjunctivae normal.   Neck:      Musculoskeletal: Normal range of motion and neck supple. Cardiovascular:      Rate and Rhythm: Normal rate and regular rhythm. Heart sounds: Normal heart sounds. No murmur. Pulmonary:      Effort: Pulmonary effort is normal. No respiratory distress. Breath sounds: Normal breath sounds. No wheezing. Abdominal:      General: There is no distension. Palpations: Abdomen is soft. There is no mass. Tenderness: There is no abdominal tenderness. Hernia: No hernia is present. Genitourinary:     Comments: Rectum grossly positive for blood  Skin:     General: Skin is warm and dry. Coloration: Skin is not jaundiced. Neurological:      General: No focal deficit present. Mental Status: She is alert and oriented to person, place, and time. Procedures     [facility]     ED Course as of Feb 26 1618 Fri Feb 26, 2021   1212 EKG: This EKG is signed and interpreted by me. Rate: 94  Rhythm: Atrial fibrillation  Interpretation: A. Fib, PVCs, ST abnormalities in anterior, inferior, and lateral leads appear new, no ST elevation, normal QTc  Comparison: changes compared to previous EKG      [JA]   6353 Dr. Brian Andrade presented in the ER, discussed the case. He will admit the patient to the ICU.    [BB]   [phone] with Dr. Kylah Lantigua, discussed the patient. He will admit the patient. [BB]      ED Course User Index  [BB] Donovan Moralez DO  [JA] Phyllis Whittaker MD      Jeff Hendersonderek presents to the ED for evaluation of fatigue and weakness. Patient with active GI bleed. Workup in the ED revealed anemia with a hgb of 4.3. The patient was transfused pRBCs in the ER, but continued to be hypotensive. Case was discussed with critical care, who will accept the patient to ICU. Spoke with Dr. Kylah Lanitgua, who will admit the patient. Patient was given IV protonix, IVF, and 3 units pRBCs for their symptoms.  Patient requires continued workup and management of their symptoms and will be admitted to the hospital for further evaluation and treatment.      --------------------------------------------- PAST HISTORY ---------------------------------------------  Past Medical History:  has a past medical history of AF (atrial fibrillation) ([facility] Anxiety, Atrial tachycardia ([facility] Breast cancer ([facility] Depression, Hyperlipidemia, Lymphadenopathy, Osteoporosis, Radiation, and Status post chemotherapy. Past Surgical History:  has a past surgical history that includes Tunneled venous port placement; Tonsillectomy; bronchoscopy (07/12/2012); Mastectomy; Cardiac catheterization (Right, 04/03/2014); Cardioversion (11/16/2016); Mastectomy (Right, 02/06/2009); Cardioversion (01/14/2021); and Upper gastrointestinal endoscopy (N/A, 2/9/2021). Social History:  reports that she has never smoked. She has never used smokeless tobacco. She reports that she does not drink alcohol or use drugs. Family History: family history includes Breast Cancer in her maternal aunt; Cancer in her father and mother. The patients home medications have been reviewed.     Allergies: Sulfa antibiotics    -------------------------------------------------- RESULTS -------------------------------------------------    LABS:  Results for orders placed or performed during the hospital encounter of 02/26/21   CBC Auto Differential   Result Value Ref Range    WBC 7.1 4.5 - 11.5 E9/L    RBC 1.73 (L) 3.50 - 5.50 E12/L    Hemoglobin 4.3 (LL) 11.5 - 15.5 g/dL    Hematocrit 15.7 (L) 34.0 - 48.0 %    MCV 90.8 80.0 - 99.9 fL    MCH 24.9 (L) 26.0 - 35.0 pg    MCHC 27.4 (L) 32.0 - 34.5 %    RDW 18.4 (H) 11.5 - 15.0 fL    Platelets 236 137 - 599 E9/L    MPV 9.1 7.0 - 12.0 fL    Neutrophils % 85.4 (H) 43.0 - 80.0 %    Immature Granulocytes % 0.6 0.0 - 5.0 %    Lymphocytes % 8.5 (L) 20.0 - 42.0 %    Monocytes % 5.4 2.0 - 12.0 %    Eosinophils % 0.0 0.0 - 6.0 %    Basophils % 0.1 0.0 - 2.0 %    Neutrophils Absolute 6.05 1.80 - 7.30 E9/L    Immature Granulocytes # 0.04 E9/L    Lymphocytes Absolute 0.60 (L) 1.50 - 4.00 E9/L    Monocytes Absolute 0.38 0.10 - 0.95 E9/L    Eosinophils Absolute 0.00 (L) 0.05 - 0.50 E9/L    Basophils Absolute 0.01 0.00 - 0.20 E9/L    Anisocytosis 1+     Polychromasia 2+     Hypochromia 2+     Poikilocytes 1+     Ovalocytes 1+     Stomatocytes 1+    Basic Metabolic Panel w/ Reflex to MG   Result Value Ref Range    Sodium 139 132 - 146 mmol/L    Potassium reflex Magnesium 3.4 (L) 3.5 - 5.0 mmol/L    Chloride 101 98 - 107 mmol/L    CO2 30 (H) 22 - 29 mmol/L    Anion Gap 8 7 - 16 mmol/L    Glucose 172 (H) 74 - 99 mg/dL    BUN 27 (H) 8 - 23 mg/dL    CREATININE 1.1 (H) 0.5 - 1.0 mg/dL    GFR Non-African American 49 >=60 mL/min/1.73    GFR African American 59     Calcium 8.1 (L) 8.6 - 10.2 mg/dL   Hepatic Function Panel   Result Value Ref Range    Total Protein 5.1 (L) 6.4 - 8.3 g/dL    Albumin 3.0 (L) 3.5 - 5.2 g/dL    Alkaline Phosphatase 46 35 - 104 U/L    ALT 7 0 - 32 U/L    AST 13 0 - 31 U/L    Total Bilirubin 0.7 0.0 - 1.2 mg/dL    Bilirubin, Direct 0.4 (H) 0.0 - 0.3 mg/dL    Bilirubin, Indirect 0.3 0.0 - 1.0 mg/dL   Troponin   Result Value Ref Range    Troponin <0.01 0.00 - 0.03 ng/mL   Brain Natriuretic Peptide   Result Value Ref Range    Pro-BNP 2,977 (H) 0 - 125 pg/mL   Protime-INR   Result Value Ref Range    Protime 32.2 (H) 9.3 - 12.4 sec    INR 2.7    Magnesium   Result Value Ref Range    Magnesium 1.8 1.6 - 2.6 mg/dL   EKG 12 Lead   Result Value Ref Range    Ventricular Rate 94 BPM    Atrial Rate 182 BPM    QRS Duration 82 ms    Q-T Interval 366 ms    QTc Calculation (Bazett) 457 ms    R Axis 56 degrees    T Axis -134 degrees   TYPE AND SCREEN   Result Value Ref Range    ABO/Rh A POS     Antibody Screen NEG    PREPARE RBC (CROSSMATCH), 3 Units   Result Value Ref Range    Product Code Blood Bank A2542M10     Description Blood Bank Red Blood Cells, Leuko-reduced     Unit Number [id number]     Dispense Status Blood Bank issued     Product Code Blood Bank [id number]     Description Blood Bank Red Blood Cells, Leuko-reduced     Unit Number [id number]     Dispense Status Blood Bank selected     Product Code Blood Bank [id number]     Description Blood Bank Red Blood Cells, Leuko-reduced     Unit Number [id number]     Dispense Status Blood Bank selected        RADIOLOGY:  XR CHEST PORTABLE   Final Result   Cardiomegaly. ------------------------- NURSING NOTES AND VITALS REVIEWED ---------------------------  Date / Time Roomed:  2/26/2021 11:19 AM  ED Bed Assignment:  21/21    The nursing notes within the ED encounter and vital signs as below have been reviewed. Patient Vitals for the past 24 hrs:   BP Temp Temp src Pulse Resp SpO2 Height Weight   02/26/21 1502 (!) 91/53   98 16 100 %     02/26/21 1423 (!) 90/53 98.1 °F (36.7 °C) Oral 103 16 100 %     02/26/21 1421 (!) 90/53 98.1 °F (36.7 °C)  102 16 100 %     02/26/21 1405 (!) 90/40   110 16 100 %     02/26/21 1351 (!) 83/40 98.1 °F (36.7 °C) Oral 93 16 100 %     02/26/21 1300 (!) 91/46   69  96 %     02/26/21 1228 (!) 85/46   77 16 100 %     02/26/21 1144    113       02/26/21 1126 (!) 82/50 98.1 °F (36.7 °C) Oral 105 18  5' 6\" (1.676 m) 165 lb (74.8 kg)       Oxygen Saturation Interpretation: Normal    ------------------------------------------ PROGRESS NOTES ------------------------------------------  Counseling:  I have spoken with the patient and discussed todays results, in addition to providing specific details for the plan of care and counseling regarding the diagnosis and prognosis. Their questions are answered at this time and they are agreeable with the plan of admission.    --------------------------------- ADDITIONAL PROVIDER NOTES ---------------------------------  Consultations:  Time: 1707. Spoke with Dr. Leti Banks. Discussed case. They will admit the patient.   This patient's

## 2021-02-26 NOTE — CONSULTS
General Surgery Consult      Patient's Name/Date of Birth: Tsering Stanton / 1945    Date: February 26, 2021     PCP: Sukhjinder Lobo DO     Chief Complaint: GI Bleed    HPI:   Tsering Stanton is a 70 y.o. female who presents for evaluation of a GI bleed. Timing is 6 days. This has happened one time before. The patient is on the following anticoagulants: xarelto. They state they drink alcohol never, deny tobacco use, and use NSAIDS never. They deny use of steroids recently. She is noted to be hypotensive with bright red blood per rectum mixed with brown stool and has a hemoglobin of 4.3 from a baseline of 12. The patient's previous EGD shows: 2/9, Dr. Jason Yancey, Hemorrhagic gastritis no active bleeding. The patient's previous colonoscopy shows: 5 to 6 years ago with a gastroenterologist, states that there was a polyp removed however no note of diverticular disease.      Patient Active Problem List   Diagnosis    Adenopathy    Malignant neoplasm of breast ([address]    Atrial fibrillation with RVR (HCC)    Dyspnea on exertion    Chronic anticoagulation    Iron deficiency anemia    Breast cancer metastasized to axillary lymph node (HCC)    Atrial flutter (HCC)    Bilateral pleural effusion    Syncope and collapse    Breast cancer metastasized to axillary lymph node, right (HCC)    Atrial tachycardia (HCC)    Rectal bleeding    GI bleed       Past Medical History:   Diagnosis Date    AF (atrial fibrillation) (HCC)     Anxiety     Atrial tachycardia ([address] 01/14/2021    Breast cancer (HCC)     right chest wall involvement    Depression     Hyperlipidemia     Lymphadenopathy     Osteoporosis     Radiation     Status post chemotherapy     neoadjuvant chemo with AC, Taxol, carboplatin, and Herceptin       Past Surgical History:   Procedure Laterality Date    BRONCHOSCOPY  07/12/2012    ebus    CARDIAC CATHETERIZATION Right 04/03/2014    Dr. Marcelina Chou  11/16/2016    CARDIOVERSION on file   Social History Narrative    Not on file       I have reviewed relevant labs from this admission and interpretation is included in my assessment and plan    Review of Systems    A complete 10 system review was performed and are otherwise negative unless mentioned in the above HPI. Specific negatives are listed below but may not include all those reviewed.     General ROS: negative obtundation, AMS  ENT ROS: negative rhinorrhea, epistaxis  Allergy and Immunology ROS: negative itchy/watery eyes or nasal congestion  Hematological and Lymphatic ROS: negative spontaneous bleeding or bruising  Endocrine ROS: negative  lethargy, mood swings, palpitations or polydipsia/polyuria  Respiratory ROS: negative sputum changes, stridor, tachypnea or wheezing  Cardiovascular ROS: negative for - loss of consciousness, murmur or orthopnea  Gastrointestinal ROS: as above  Genito-Urinary ROS: negative for -  genital discharge or hematuria  Musculoskeletal ROS: negative for - focal weakness, gangrene  Psych/Neuro ROS: negative for - visual or auditory hallucinations, suicidal ideation    Physical exam:   BP (!) 91/53   Pulse 98   Temp 98.1 °F (36.7 °C) (Oral)   Resp 16   Ht 5' 6\" (1.676 m)   Wt 165 lb (74.8 kg)   SpO2 100%   BMI 26.63 kg/m²   General appearance:  NAD, appears stated age  Head: NCAT, PERRLA, EOMI, red conjunctiva  Neck: supple, no masses, trachea midline  Lungs: Equal chest rise bilateral, no retractions, no wheezing  Heart: Reg rate  Abdomen: soft, no tenderness, nondistended  Skin; warm and dry, no cyanosis  Gu: no cva tenderness  Extremities: atraumatic, no focal motor deficits, no open wounds  Psych: No tremor, visual hallucinations    Assessment:  Nate Ely is a 70 y.o. female with recurrent bright red blood per rectum  Patient Active Problem List   Diagnosis    Adenopathy    Malignant neoplasm of breast ([address]    Atrial fibrillation with RVR (HCC)    Dyspnea on exertion    Chronic anticoagulation    Iron deficiency anemia    Breast cancer metastasized to axillary lymph node (HCC)    Atrial flutter (HCC)    Bilateral pleural effusion    Syncope and collapse    Breast cancer metastasized to axillary lymph node, right (HCC)    Atrial tachycardia (HCC)    Rectal bleeding    GI bleed         Plan:  Diet: ok for clear liquid  Hold anticoagulants  PPI  Carafate  Transfuse for Hgb <7 or symptomatic anemia  Recommend avoiding NSAIDs, EtOH, and tobacco  Serial Hgb  Consider colonoscopy pending progress once resuscitated    To be discussed with Dr. Nenita Azul MD  4:16 PM  2/26/2021

## 2021-02-26 NOTE — PROGRESS NOTES
Attending Physician Attestation: Dr. Sonny Leo    Thank you very much for allowing me to see this patient in consultation and follow up. I personally saw, examined and provided care for the patient. Radiographs, labs and medication list were reviewed by me independently. I spoke with bedside nursing, respiratory therapists and consultants. Critical care services and times documented are independent of procedures and multidisciplinary rounds with Residents. Additionally comprehensive, multidisciplinary rounds were conducted with the MICU team. The case was discussed in detail and plans for care were established. Review of Residents documentation was conducted and revisions were made as appropriate. I agree with the the above documented information.      Physical Examination:  Vitals:   Vitals:    02/26/21 1405 02/26/21 1421 02/26/21 1423 02/26/21 1502   BP: (!) 90/40 (!) 90/53 (!) 90/53 (!) 91/53   Pulse: 110 102 103 98   Resp: 16 16 16 16   Temp:  98.1 °F (36.7 °C) 98.1 °F (36.7 °C)    TempSrc:   Oral    SpO2: 100% 100% 100% 100%   Weight:       Height:          General: No Acute Distress  HEENT: normocephalic, atruamatic, conjunctival pallor noted  CVS: RRR, S1, S2, No S3 or S4  Respiratory: decreased breath sounds at lung bases, no focal wheezes noted  Extremities: no clubbing/cyanosis/edema  Neuro: interactive    ASSESSMENT:  1.) GI Bleed, H/O Hemorrhagic Gastritis   - s/p EGD 2/9/2021 with Dr. Debbie Bill   2.) Acute on Chronic Anemia - presentation hemoglobin 4.3 g/dL  3.) Atrial Fibrillation   4.) Anxiety   5.0 Acute Kidney Injury     In addition the following applies:    Check: blood culture x 2  Medication Alterations: hold xarelto, initiate protonix drip   Procedures: defer to GS  Imaging: reviewed  New Consultations: GS  VENT: N/A    Access: Peripheral   Consults: General Surgery   Drips: N/A  Nutrition: NPO  ABX: N/A    - 2 units PRBC  - pressors as needed   supportive care to continue    Thank you for allowing me to participate in the care of this patient. Care reviewed with nursing staff, medical and surgical specialty care, primary care and the patient's family as available. Restraints are ordered when the patient can do harm to him/herself by pulling out devices. Critical Care Time: > 35 minutes excluding procedures    LAURA Harvey  2/26/2021  4:03 PM

## 2021-02-27 LAB
ALBUMIN SERPL-MCNC: 3 G/DL (ref 3.5–5.2)
ALP BLD-CCNC: 47 U/L (ref 35–104)
ALT SERPL-CCNC: 8 U/L (ref 0–32)
ANION GAP SERPL CALCULATED.3IONS-SCNC: 4 MMOL/L (ref 7–16)
ANION GAP SERPL CALCULATED.3IONS-SCNC: 6 MMOL/L (ref 7–16)
AST SERPL-CCNC: 22 U/L (ref 0–31)
BASOPHILS ABSOLUTE: 0.01 E9/L (ref 0–0.2)
BASOPHILS RELATIVE PERCENT: 0.1 % (ref 0–2)
BILIRUB SERPL-MCNC: 1.5 MG/DL (ref 0–1.2)
BILIRUBIN DIRECT: 0.7 MG/DL (ref 0–0.3)
BILIRUBIN, INDIRECT: 0.8 MG/DL (ref 0–1)
BLOOD BANK DISPENSE STATUS: NORMAL
BLOOD BANK PRODUCT CODE: NORMAL
BPU ID: NORMAL
BUN BLDV-MCNC: 23 MG/DL (ref 8–23)
BUN BLDV-MCNC: 24 MG/DL (ref 8–23)
CALCIUM SERPL-MCNC: 8.3 MG/DL (ref 8.6–10.2)
CALCIUM SERPL-MCNC: 8.4 MG/DL (ref 8.6–10.2)
CHLORIDE BLD-SCNC: 105 MMOL/L (ref 98–107)
CHLORIDE BLD-SCNC: 107 MMOL/L (ref 98–107)
CO2: 33 MMOL/L (ref 22–29)
CO2: 33 MMOL/L (ref 22–29)
CREAT SERPL-MCNC: 0.9 MG/DL (ref 0.5–1)
CREAT SERPL-MCNC: 1 MG/DL (ref 0.5–1)
DESCRIPTION BLOOD BANK: NORMAL
EOSINOPHILS ABSOLUTE: 0.02 E9/L (ref 0.05–0.5)
EOSINOPHILS RELATIVE PERCENT: 0.3 % (ref 0–6)
GFR AFRICAN AMERICAN: >60
GFR AFRICAN AMERICAN: >60
GFR NON-AFRICAN AMERICAN: 54 ML/MIN/1.73
GFR NON-AFRICAN AMERICAN: >60 ML/MIN/1.73
GLUCOSE BLD-MCNC: 103 MG/DL (ref 74–99)
GLUCOSE BLD-MCNC: 94 MG/DL (ref 74–99)
HCT VFR BLD CALC: 22.9 % (ref 34–48)
HCT VFR BLD CALC: 27.4 % (ref 34–48)
HCT VFR BLD CALC: 27.9 % (ref 34–48)
HCT VFR BLD CALC: 28.2 % (ref 34–48)
HEMOGLOBIN: 6.7 G/DL (ref 11.5–15.5)
HEMOGLOBIN: 8.1 G/DL (ref 11.5–15.5)
HEMOGLOBIN: 8.1 G/DL (ref 11.5–15.5)
HEMOGLOBIN: 8.6 G/DL (ref 11.5–15.5)
IMMATURE GRANULOCYTES #: 0.05 E9/L
IMMATURE GRANULOCYTES %: 0.7 % (ref 0–5)
LYMPHOCYTES ABSOLUTE: 1.09 E9/L (ref 1.5–4)
LYMPHOCYTES RELATIVE PERCENT: 16.2 % (ref 20–42)
MCH RBC QN AUTO: 26.6 PG (ref 26–35)
MCHC RBC AUTO-ENTMCNC: 29.6 % (ref 32–34.5)
MCV RBC AUTO: 90.1 FL (ref 80–99.9)
MONOCYTES ABSOLUTE: 0.76 E9/L (ref 0.1–0.95)
MONOCYTES RELATIVE PERCENT: 11.3 % (ref 2–12)
NEUTROPHILS ABSOLUTE: 4.8 E9/L (ref 1.8–7.3)
NEUTROPHILS RELATIVE PERCENT: 71.4 % (ref 43–80)
PDW BLD-RTO: 16.6 FL (ref 11.5–15)
PLATELET # BLD: 170 E9/L (ref 130–450)
PMV BLD AUTO: 9.3 FL (ref 7–12)
POTASSIUM REFLEX MAGNESIUM: 3.7 MMOL/L (ref 3.5–5)
POTASSIUM REFLEX MAGNESIUM: 3.9 MMOL/L (ref 3.5–5)
RBC # BLD: 3.04 E12/L (ref 3.5–5.5)
SODIUM BLD-SCNC: 144 MMOL/L (ref 132–146)
SODIUM BLD-SCNC: 144 MMOL/L (ref 132–146)
TOTAL PROTEIN: 5.1 G/DL (ref 6.4–8.3)
WBC # BLD: 6.7 E9/L (ref 4.5–11.5)

## 2021-02-27 PROCEDURE — 6370000000 HC RX 637 (ALT 250 FOR IP): Performed by: INTERNAL MEDICINE

## 2021-02-27 PROCEDURE — 85018 HEMOGLOBIN: CPT

## 2021-02-27 PROCEDURE — 85025 COMPLETE CBC W/AUTO DIFF WBC: CPT

## 2021-02-27 PROCEDURE — 2580000003 HC RX 258: Performed by: INTERNAL MEDICINE

## 2021-02-27 PROCEDURE — 2060000000 HC ICU INTERMEDIATE R&B

## 2021-02-27 PROCEDURE — 6360000002 HC RX W HCPCS: Performed by: INTERNAL MEDICINE

## 2021-02-27 PROCEDURE — 6370000000 HC RX 637 (ALT 250 FOR IP): Performed by: FAMILY MEDICINE

## 2021-02-27 PROCEDURE — 36591 DRAW BLOOD OFF VENOUS DEVICE: CPT

## 2021-02-27 PROCEDURE — C9113 INJ PANTOPRAZOLE SODIUM, VIA: HCPCS | Performed by: INTERNAL MEDICINE

## 2021-02-27 PROCEDURE — 80076 HEPATIC FUNCTION PANEL: CPT

## 2021-02-27 PROCEDURE — 85014 HEMATOCRIT: CPT

## 2021-02-27 PROCEDURE — 36430 TRANSFUSION BLD/BLD COMPNT: CPT

## 2021-02-27 PROCEDURE — 80048 BASIC METABOLIC PNL TOTAL CA: CPT

## 2021-02-27 PROCEDURE — 2580000003 HC RX 258: Performed by: FAMILY MEDICINE

## 2021-02-27 PROCEDURE — 36415 COLL VENOUS BLD VENIPUNCTURE: CPT

## 2021-02-27 PROCEDURE — P9016 RBC LEUKOCYTES REDUCED: HCPCS

## 2021-02-27 RX ORDER — PAROXETINE HYDROCHLORIDE 20 MG/1
20 TABLET, FILM COATED ORAL EVERY MORNING
Status: DISCONTINUED | OUTPATIENT
Start: 2021-02-27 | End: 2021-03-03 | Stop reason: HOSPADM

## 2021-02-27 RX ORDER — ALPRAZOLAM 0.25 MG/1
0.25 TABLET ORAL DAILY PRN
Status: DISCONTINUED | OUTPATIENT
Start: 2021-02-27 | End: 2021-03-03 | Stop reason: HOSPADM

## 2021-02-27 RX ORDER — ACETAMINOPHEN 650 MG/1
650 SUPPOSITORY RECTAL EVERY 6 HOURS PRN
Status: DISCONTINUED | OUTPATIENT
Start: 2021-02-27 | End: 2021-03-03 | Stop reason: HOSPADM

## 2021-02-27 RX ORDER — ATORVASTATIN CALCIUM 10 MG/1
10 TABLET, FILM COATED ORAL NIGHTLY
Status: DISCONTINUED | OUTPATIENT
Start: 2021-02-27 | End: 2021-03-03 | Stop reason: HOSPADM

## 2021-02-27 RX ORDER — SODIUM CHLORIDE 0.9 % (FLUSH) 0.9 %
10 SYRINGE (ML) INJECTION PRN
Status: DISCONTINUED | OUTPATIENT
Start: 2021-02-27 | End: 2021-03-03 | Stop reason: HOSPADM

## 2021-02-27 RX ORDER — DOFETILIDE 0.25 MG/1
250 CAPSULE ORAL EVERY 12 HOURS SCHEDULED
Status: DISCONTINUED | OUTPATIENT
Start: 2021-02-27 | End: 2021-03-03 | Stop reason: HOSPADM

## 2021-02-27 RX ORDER — ACETAMINOPHEN 325 MG/1
650 TABLET ORAL EVERY 6 HOURS PRN
Status: DISCONTINUED | OUTPATIENT
Start: 2021-02-27 | End: 2021-03-03 | Stop reason: HOSPADM

## 2021-02-27 RX ORDER — SODIUM CHLORIDE 0.9 % (FLUSH) 0.9 %
10 SYRINGE (ML) INJECTION EVERY 12 HOURS SCHEDULED
Status: DISCONTINUED | OUTPATIENT
Start: 2021-02-27 | End: 2021-03-03 | Stop reason: HOSPADM

## 2021-02-27 RX ADMIN — DOFETILIDE 250 MCG: 0.25 CAPSULE ORAL at 21:02

## 2021-02-27 RX ADMIN — SODIUM CHLORIDE, PRESERVATIVE FREE 10 ML: 5 INJECTION INTRAVENOUS at 21:02

## 2021-02-27 RX ADMIN — Medication 10 ML: at 08:39

## 2021-02-27 RX ADMIN — Medication 10 ML: at 21:03

## 2021-02-27 RX ADMIN — ATORVASTATIN CALCIUM 10 MG: 10 TABLET, FILM COATED ORAL at 21:02

## 2021-02-27 RX ADMIN — SODIUM CHLORIDE, PRESERVATIVE FREE 10 ML: 5 INJECTION INTRAVENOUS at 08:39

## 2021-02-27 RX ADMIN — DOFETILIDE 250 MCG: 0.25 CAPSULE ORAL at 08:38

## 2021-02-27 RX ADMIN — PANTOPRAZOLE SODIUM 40 MG: 40 INJECTION, POWDER, FOR SOLUTION INTRAVENOUS at 08:38

## 2021-02-27 RX ADMIN — PAROXETINE HYDROCHLORIDE 20 MG: 20 TABLET, FILM COATED ORAL at 08:38

## 2021-02-27 RX ADMIN — PANTOPRAZOLE SODIUM 40 MG: 40 INJECTION, POWDER, FOR SOLUTION INTRAVENOUS at 21:02

## 2021-02-27 ASSESSMENT — PAIN SCALES - GENERAL: PAINLEVEL_OUTOF10: 0

## 2021-02-27 NOTE — PROGRESS NOTES
Pt is requiring increasing oxygen, normally only wears 2L/min O2 via NC at HS, for past few days has required it all day. Today reports feeling more SOB. Pt takes Bumex 5mg PO daily, states she has not taken it since Thursday morning she skipped her dose today as she believed she would receive it in the hospital. Pt received 500mL bolus in ED NaCl, also received one PRBC unit with flush (approx 330ml PRBC and flush amount approx 100ml). Addressed this with ICU resident regarding worry for volume overload, pt BNP today is >2,000, cardiomegaly on CXR, and is symptomatic having LEVIN, requiring additional O2 then her normal. Pt to receive 2 additional units of PRBC's. Pt initially hypotensive in ED, has began to recover after 1st unit of blood product. Questioned possibility of need for diuretic surrounding these concerns. Questioned if BMP re-check needs to be performed through the night related to blood product (concern for citrate in blood product and potential for electrolyte imbalance). *No new orders received regarding diuretic, states ICU team will re-evaluate in AM. Concern for hypotensive issues over night. *New order for BMP and H/H recheck through night.

## 2021-02-27 NOTE — H&P
Jeevan Hooks is a 70 y.o. female with a PMHx significant for atrial fibrillation on anticoagulation (stopped yesterday), history of breast Ca not on chemotherapy, rectal bleeding, who presents for evaluation of bright red blood per rectum, beginning about one week ago. The complaint has been persistent, moderate in severity, and worsened by nothing. The patient states that she had history of a rectal bleed for which she was scoped by Dr. Ronni Rodríguez on 02/09 and found to have hemorrhagic gastritis. She was taken off anticoagulation at that time, but resumed when the bleeding stopped. Patient notes for the last week she has been having three to four bloody bowel movements. She started to feel fatigued, dizzy, and lightheaded so she presented for evaluation. Pt admitted to icu, this am still hypotensive tachycardic but hgb 8.1 after 3 units prbcs  Past Medical History:   Diagnosis Date    AF (atrial fibrillation) (HCC)     Anxiety     Atrial tachycardia ([address] 01/14/2021    Breast cancer (HCC)     right chest wall involvement    Depression     Hyperlipidemia     Lymphadenopathy     Osteoporosis     Radiation     Status post chemotherapy     neoadjuvant chemo with AC, Taxol, carboplatin, and Herceptin     Past Surgical History:   Procedure Laterality Date    BRONCHOSCOPY  07/12/2012    ebus    CARDIAC CATHETERIZATION Right 04/03/2014    Dr. Mora Led  11/16/2016    CARDIOVERSION  01/14/2021    Successful    (Dr. Jennifer Scott)    MASTECTOMY      RT - 2010    MASTECTOMY Right 02/06/2009    TONSILLECTOMY      TUNNELED VENOUS PORT PLACEMENT      UPPER GASTROINTESTINAL ENDOSCOPY N/A 2/9/2021    EGD ESOPHAGOGASTRODUODENOSCOPY performed by Glenda Colon MD at [address]     No current facility-administered medications on file prior to encounter.       Current Outpatient Medications on File Prior to Encounter   Medication Sig Dispense Refill    bumetanide (BUMEX) 1 MG tablet Take 1 mg by mouth as needed Indications: May Take Addional Dose if Needed      Influenza Vac High-Dose Quad (FLUZONE) 0.7 ML ADELA injection Inject 0.7 mLs into the muscle once *GIVEN AT [facility]*      zoster recombinant adjuvanted vaccine (SHINGRIX) 50 MCG/0.5ML SUSR injection Inject 0.5 mLs into the muscle once *GIVEN AT [facility]*      OXYGEN Inhale 2 L into the lungs nightly      potassium chloride (KLOR-CON M) 20 MEQ extended release tablet Take 1 tablet by mouth daily 60 tablet 3    pantoprazole (PROTONIX) 40 MG tablet Take 1 tablet by mouth every morning (before breakfast) 30 tablet 5    rivaroxaban (XARELTO) 20 MG TABS tablet Take 1 tablet by mouth Daily with supper 90 tablet 3    dofetilide (TIKOSYN) 250 MCG capsule Take 1 capsule by mouth every 12 hours 180 capsule 3    bumetanide (BUMEX) 1 MG tablet Take 1 tablet by mouth daily 90 tablet 3    lisinopril (PRINIVIL;ZESTRIL) 5 MG tablet Take 1 tablet by mouth daily 30 tablet 3    PARoxetine (PAXIL) 20 MG tablet Take 1 tablet by mouth every morning 90 tablet 1    atorvastatin (LIPITOR) 10 MG tablet Take 10 mg by mouth nightly       ALPRAZolam (XANAX) 0.25 MG tablet Take 0.25 mg by mouth daily as needed for Anxiety. 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   Social Needs    Financial resource strain: Not on file    Food insecurity     Worry: Not on file     Inability: Not on file    Transportation needs     Medical: Not on file     Non-medical: Not on file   Tobacco Use    Smoking status: Never Smoker    Smokeless tobacco: Never Used   Substance and Sexual Activity    Alcohol use: No    Drug use: No    Sexual activity: Never   Lifestyle    Physical activity     Days per week: Not on file     Minutes per session: Not on file    Stress: Not on file   Relationships    Social connections     Talks on phone: Not on file     Gets together: Not on file     Attends Worship service: Not on file     Active member of club or organization: Not on file     Attends meetings of clubs or organizations: Not on file     Relationship status: Not on file    Intimate partner violence     Fear of current or ex partner: Not on file     Emotionally abused: Not on file     Physically abused: Not on file     Forced sexual activity: Not on file   Other Topics Concern    Not on file   Social History Narrative    Not on file       Family History   Problem Relation Age of Onset    Cancer Mother         female organs    Cancer Father         bowel?  Breast Cancer Maternal Aunt        Blood pressure 99/62, pulse 125, temperature 97.6 °F (36.4 °C), resp. rate 25, height 5' 6\" (1.676 m), weight 163 lb 2.3 oz (74 kg), SpO2 96 %, not currently breastfeeding. awake, alert  Pale.  Comfortable  No jvd  hrrr  Lungs decreased bs but clear  abd good bs, soft, no pain, no edema, neurovascular intact    A; acute on chronic blood loss anemia, hypotensive  Gastric bleed  paf  Anxiety, depression  Home meds, no anticoagulation, monitor vitals,hgb  Surgery to see

## 2021-02-27 NOTE — PROGRESS NOTES
..  Intensive Care Daily Quality Rounding Checklist      ICU Team Members: Dr. Frank Willard, residents, charge nurse and bedside nurse    ICU Day #: 2    Intubation Date: NA    Ventilator Day #: NA    Central Line Insertion Date: 2/8/21        Day #:      Arterial Line Insertion Date: NA      Day #:     Temporary Hemodialysis Catheter Insertion Date:       Day #     DVT Prophylaxis: on hold (GI bleed)    GI Prophylaxis: Protonix    Valdez Catheter Insertion Date:        Day #:       Continued need (if yes, reason documented and discussed with physician):     Skin Issues/ Wounds and ordered treatment discussed on rounds:     Goals/ Plans for the Day: Monitor labs and vitals. Consult cardiology. EGD and colonoscopy for Monday.

## 2021-02-27 NOTE — PROGRESS NOTES
1258: Pt to be transferred to Room 406. Report called to 4W RN, requested telepack for transport. Pt notified of pending transfer. EGD and colonoscopy consent obtained for procedure on Monday. Attempt made to contact pt son, Armida Palacios, about pending transfer-no answer. 1315: Telepack applied, Tele notified, confirmed patient monitoring.   1316: Pt transport arrived

## 2021-02-27 NOTE — PROGRESS NOTES
Attending Physician Attestation: Dr. Margie Kent    Thank you very much for allowing me to see this patient in consultation and follow up. I personally saw, examined and provided care for the patient. Radiographs, labs and medication list were reviewed by me independently. I spoke with bedside nursing, respiratory therapists and consultants. Critical care services and times documented are independent of procedures and multidisciplinary rounds with Residents. Additionally comprehensive, multidisciplinary rounds were conducted with the MICU team. The case was discussed in detail and plans for care were established. Review of Residents documentation was conducted and revisions were made as appropriate. I agree with the the above documented information.      Physical Examination:  Vitals:   Vitals:    02/27/21 0800 02/27/21 0900 02/27/21 1000 02/27/21 1100   BP: 126/70 (!) 98/49 113/66 (!) 100/47   Pulse: 126 133 103 113   Resp: 20 24 (!) 33 28   Temp: 98.2 °F (36.8 °C)      TempSrc: Oral      SpO2: 97% 100% 98% 99%   Weight:       Height:          General: No Acute Distress  HEENT: normocephalic, atruamatic, conjunctival pallor noted  CVS: RRR, S1, S2, No S3 or S4  Respiratory: decreased breath sounds at lung bases, no focal wheezes noted  Extremities: no clubbing/cyanosis/edema  Neuro: interactive    ASSESSMENT:  1.) GI Bleed, H/O Hemorrhagic Gastritis   - s/p EGD 2/9/2021 with Dr. Federica Keller   2.) Acute on Chronic Anemia - presentation hemoglobin 4.3 g/dL  - Hgb 8.1 on 2/27/2021  3.) Atrial Fibrillation   4.) Anxiety   5.) Acute Kidney Injury     In addition the following applies:    Check: N/A  Medication Alterations: hold xarelto, initiate protonix drip   Procedures: defer to GS  Imaging: reviewed  New Consultations: GS, Cardiology   VENT: N/A    Access: Peripheral   Consults: General Surgery, Cardiology    Drips: N/A  Nutrition: NPO  ABX: N/A    - PRBC to keep Hgb > 7  - pressors as needed  - supportive care to continue  - cardiology consultation for chronic atrial fibrillation with new contraindication to long-term anticoagulation given re-bleed on xarelto (2 times in 4 week period)  - Full liquids today  - Clear liquids and bowel prep tomorrow  - EGD and colonoscopy Monday  - case reviewed with Dr. Alyssa Badillo in ICU level of care   - OK to floors with pulmonary to sign off    Thank you for allowing me to participate in the care of this patient. Care reviewed with nursing staff, medical and surgical specialty care, primary care and the patient's family as available. Restraints are ordered when the patient can do harm to him/herself by pulling out devices. Jose Cevallos M.D.     Jose Cevallos  2/27/2021  11:45 AM

## 2021-02-27 NOTE — CONSULTS
[facility]  Department of Critical Care   MICU Progress Note      Patient:  Say Solomon 70 y.o. female  MRN: [id number]     Date of Service: 2/27/2021    Allergy: Sulfa antibiotics      History of Present Illness   The patient is a 70 y.o. female with history of atrial fibrillation on Xarelto (stopped yesterday), breast cancer x10 years ago, no longer on chemotherapy but with port present on the left; and recent GI bleed with admission from 2/8 to 2/11, s/p EGD on 2/9 which showed hemorrhagic gastritis without active bleeding, who presented to the ED on 2/26 due to bright red blood per rectum x1 week. According to the patient she has been having 3-4 bloody bowel movements daily for the past week; she admits to fatigue, dizziness, lightheadedness. Denies any abdominal pain, chest pain, nausea, vomiting, or diarrhea. She was noted to be hypotensive in the ED, systolic blood pressure in the low 80s, she was given 1 500 cc fluid bolus, 80 mg IV Protonix; hemoglobin was found to be 4.3, x2 unit PRBCs thus far being transfused. Platelets WNLs. She was in no acute distress. UA was unremarkable. INR 2.7. CXR showed cardiomegaly, no acute abnormalities. Blood pressure improving with resuscitation, systolic in the 25E; patient will be seen by general surgery during this encounter. She is accepted for admission to the ICU due to hemorrhagic shock.         Objective     VS: /66   Pulse 103   Temp 98.2 °F (36.8 °C) (Oral)   Resp (!) 33   Ht 5' 6\" (1.676 m)   Wt 163 lb 2.3 oz (74 kg)   SpO2 98%   BMI 26.33 kg/m²           I & O - 24hr:     Intake/Output Summary (Last 24 hours) at 2/27/2021 1114  Last data filed at 2/27/2021 1000  Gross per 24 hour   Intake 2340.67 ml   Output 500 ml   Net 1840.67 ml       Physical Exam:  · General Appearance: alert, appears stated age, cooperative and generalized pallor  · Neck: supple, symmetrical, trachea midline  · Lung: clear to auscultation bilaterally  · Heart: irregularly irregular rhythm  · Abdomen: soft, non-tender; bowel sounds normal; no masses,  no organomegaly  · Extremities:  extremities normal, atraumatic, no cyanosis or edema  · Musculoskeletal: No joint swelling, no muscle tenderness. ROM normal in all joints of extremities. · Neurologic: Mental status: Alert, oriented, thought content appropriate    Labs and Imaging Studies   Basic Labs  Recent Labs     02/26/21  1201 02/27/21  0019 02/27/21  0530    144 144   K 3.4* 3.9 3.7    105 107   CO2 30* 33* 33*   BUN 27* 24* 23   CREATININE 1.1* 1.0 0.9   GLUCOSE 172* 103* 94   CALCIUM 8.1* 8.4* 8.3*       Recent Labs     02/26/21  1201 02/27/21  0019 02/27/21  0530   WBC 7.1  --  6.7   RBC 1.73*  --  3.04*   HGB 4.3* 6.7* 8.1*   HCT 15.7* 22.9* 27.4*   MCV 90.8  --  90.1   MCH 24.9*  --  26.6   MCHC 27.4*  --  29.6*   RDW 18.4*  --  16.6*     --  170   MPV 9.1  --  9.3       Imaging Studies:    Ct Abdomen Pelvis W Wo Contrast Additional Contrast? Radiologist Recommendation    Result Date: 2/8/2021  Reason for exam:->abdominal pain, gi bleed  FINDINGS: Lower Chest: Cardiomegaly and mild ground-glass opacities at the lung bases. Minimal right pleural effusion. Organs: No focal liver lesions or biliary ductal dilatation. Gallbladder present with calcified stones. Spleen is not enlarged. Pancreas is atrophic with no focal masses or peripancreatic infiltration. Adrenal glands appear unremarkable. There is symmetric enhancement of the kidneys. Bilateral renal cysts measuring up to 1.6 cm on the right and 6.2 cm on the left, slightly increased in size. GI/Bowel: There are no obstructing or constricting lesions. Appendix is not visualized. No pericecal inflammation to suggest acute appendicitis. Pelvis: The uterus is present. Bladder is incompletely distended. No pathologic pelvic masses or significant free fluid.  Peritoneum/Retroperitoneum: No free air or significant adenopathy. Bones/Soft Tissues: Degenerative changes in the spine. No CT evidence of acute intra-abdominal or pelvic inflammatory process. Cardiomegaly with mild ground-glass opacity at the lung bases and small right pleural effusion. Gallstones. Bilateral renal cysts, slightly increased since prior study. Xr Chest Portable    Result Date: 2/26/2021  Reason for exam:->GI bleed, tachy   FINDINGS: Implanted central venous catheter on the left is unchanged. Heart is enlarged. Pulmonary vascularity is normal.  Lungs are clear. Neither costophrenic angle is blunted. Cardiomegaly. Lines     site day    Art line   None    TLC None    PICC None    Hemoaccess Left venous port, pre-existing, accessed 2/26 Pre-existing; accessed 2/26   Oxygen:    · nasal canula at 2L/min         Medications     Infusions: (Fluid, Sedation, Vasopressors)  Vasopressors   As needed  Sedation   None    Nutrition:   CLD    ATB:   Antibiotics  Days   none              Skin issues: none    Patient currently has   DVT prophylaxis/ GI prophylaxis        EKG: Atrial fibrillation with PVCs, without RVR    Resident's Assessment and Plan     1.) GI Bleed, H/O Hemorrhagic Gastritis   - s/p EGD 2/9/2021 with Dr. Johana Sin   2.) Acute on Chronic Anemia - presentation hemoglobin 4.3 g/dL  3.) Atrial Fibrillation   4.) Anxiety   5.0 Acute Kidney Injury     1. Neuro   AAOx3   Moving all extremities   No complaints or abnormalities    2. Cardiovascular   History atrial fibrillation with RVR, chronic anticoagulation with Xarelto which was stopped yesterday   Hold anticoagulants   On Tikosyn for several years now, she is in atrial fibrillation today   Troponin negative in ED, proBNP 2977 (elevated in past, unsure of any recent baselines)   Echocardiogram in 2019 showing EF 65%   Monitor for any fluid overload   Monitor HR    3.   Respiratory   CXR shows cardiomegaly, otherwise unremarkable   Hx CHF, on bumex 1mg daily   Lung sounds CTA b/l today -- hypotensive at this time, PRBC resuscitation, cont to monitor BP and respiratory status, diurese as necessary   On 2L NC O2 -- hgb 4.3, for increased O2 delivery; no respiratory distress    4. GI   Status post EGD on 2/9 which showed hemorrhagic gastritis without active bleeding at the time;   GI bleed, hemoglobin 4.9, hypotensive   Resuscitate PRBCs, repeat hemoglobin   Initiate Protonix drip   General surgery consult -- possibly for EGD tomorrow   CLD per GS    5. Renal   Creatinine slightly above baseline, most recently 0.9, today 1.1; Daily BMPs   Potassium 3.4, replete as needed    6.   Heme-onc  · GI bleed leading to anemia requiring transfusion, hemoglobin 4.3   · X2 units PRBCs thus far  · Hold Xarelto  · Monitor H/H -- consider 1:1:1 (FFP, plts) if further transfusions are needed     Plan 2/27/2021  - Hgb improved today s/p x3U PRBCs  - on 2L NC O2, no SOB, AAOx3  - EGD/colonoscopy for Monday 3/1 per GS  - chronic afib on tikosyn w/ current contraindication for anticoagulation  - cardiology consult  - OK to transfer out of MICU    PT/OT evaluation: defer for now  DVT prophylaxis/ GI prophylaxis: SCDs/protonix  Disposition: OK for transfer to intermediate floor    Carmela Prado DO, PGY-1   2/27/21; 9736  Attending physician: Dr. Hiro Belle MD

## 2021-02-27 NOTE — PROGRESS NOTES
Seen/examined  Feels much better today  Denies abdominal pain  No further episodes of bleeding reported  Abdomen soft and non-distended  Hemoglobin 8.1   Full liquids today  Clear liquids and bowel prep tomorrow  EGD and colonoscopy Monday  Discussed with patient  Kortney Kelly MD

## 2021-02-27 NOTE — PLAN OF CARE
Problem: Pain:  Goal: Pain level will decrease  Description: Pain level will decrease  2/26/2021 2049 by Lius Escalante RN  Outcome: Ongoing  2/26/2021 1704 by Edson Conde RN  Outcome: Met This Shift  Goal: Control of acute pain  Description: Control of acute pain  2/26/2021 2049 by Luis Escalante RN  Outcome: Ongoing  2/26/2021 1704 by Edson Conde RN  Outcome: Met This Shift  Goal: Control of chronic pain  Description: Control of chronic pain  2/26/2021 2049 by Luis Escalante RN  Outcome: Ongoing  2/26/2021 1704 by Edson Conde RN  Outcome: Met This Shift

## 2021-02-28 LAB
ALBUMIN SERPL-MCNC: 2.8 G/DL (ref 3.5–5.2)
ALP BLD-CCNC: 54 U/L (ref 35–104)
ALT SERPL-CCNC: 6 U/L (ref 0–32)
ANION GAP SERPL CALCULATED.3IONS-SCNC: 4 MMOL/L (ref 7–16)
AST SERPL-CCNC: 15 U/L (ref 0–31)
BASOPHILS ABSOLUTE: 0.01 E9/L (ref 0–0.2)
BASOPHILS RELATIVE PERCENT: 0.2 % (ref 0–2)
BILIRUB SERPL-MCNC: 1.1 MG/DL (ref 0–1.2)
BILIRUBIN DIRECT: 0.5 MG/DL (ref 0–0.3)
BILIRUBIN, INDIRECT: 0.6 MG/DL (ref 0–1)
BUN BLDV-MCNC: 17 MG/DL (ref 8–23)
CALCIUM SERPL-MCNC: 8.1 MG/DL (ref 8.6–10.2)
CHLORIDE BLD-SCNC: 102 MMOL/L (ref 98–107)
CO2: 33 MMOL/L (ref 22–29)
CREAT SERPL-MCNC: 0.8 MG/DL (ref 0.5–1)
EOSINOPHILS ABSOLUTE: 0.16 E9/L (ref 0.05–0.5)
EOSINOPHILS RELATIVE PERCENT: 2.4 % (ref 0–6)
GFR AFRICAN AMERICAN: >60
GFR NON-AFRICAN AMERICAN: >60 ML/MIN/1.73
GLUCOSE BLD-MCNC: 96 MG/DL (ref 74–99)
HCT VFR BLD CALC: 27.3 % (ref 34–48)
HCT VFR BLD CALC: 28.4 % (ref 34–48)
HCT VFR BLD CALC: 31.6 % (ref 34–48)
HCT VFR BLD CALC: 32.1 % (ref 34–48)
HEMOGLOBIN: 8.3 G/DL (ref 11.5–15.5)
HEMOGLOBIN: 8.3 G/DL (ref 11.5–15.5)
HEMOGLOBIN: 9 G/DL (ref 11.5–15.5)
HEMOGLOBIN: 9.2 G/DL (ref 11.5–15.5)
IMMATURE GRANULOCYTES #: 0.05 E9/L
IMMATURE GRANULOCYTES %: 0.8 % (ref 0–5)
IRON SATURATION: 10 % (ref 15–50)
IRON: 26 MCG/DL (ref 37–145)
LYMPHOCYTES ABSOLUTE: 1.32 E9/L (ref 1.5–4)
LYMPHOCYTES RELATIVE PERCENT: 19.8 % (ref 20–42)
MCH RBC QN AUTO: 26.6 PG (ref 26–35)
MCHC RBC AUTO-ENTMCNC: 29.2 % (ref 32–34.5)
MCV RBC AUTO: 91 FL (ref 80–99.9)
MONOCYTES ABSOLUTE: 0.81 E9/L (ref 0.1–0.95)
MONOCYTES RELATIVE PERCENT: 12.2 % (ref 2–12)
MRSA CULTURE ONLY: NORMAL
NEUTROPHILS ABSOLUTE: 4.31 E9/L (ref 1.8–7.3)
NEUTROPHILS RELATIVE PERCENT: 64.6 % (ref 43–80)
PDW BLD-RTO: 17.1 FL (ref 11.5–15)
PLATELET # BLD: 157 E9/L (ref 130–450)
PMV BLD AUTO: 9.1 FL (ref 7–12)
POTASSIUM REFLEX MAGNESIUM: 3.7 MMOL/L (ref 3.5–5)
RBC # BLD: 3.12 E12/L (ref 3.5–5.5)
SODIUM BLD-SCNC: 139 MMOL/L (ref 132–146)
TOTAL IRON BINDING CAPACITY: 267 MCG/DL (ref 250–450)
TOTAL PROTEIN: 5.1 G/DL (ref 6.4–8.3)
WBC # BLD: 6.7 E9/L (ref 4.5–11.5)

## 2021-02-28 PROCEDURE — 6370000000 HC RX 637 (ALT 250 FOR IP): Performed by: INTERNAL MEDICINE

## 2021-02-28 PROCEDURE — 6360000002 HC RX W HCPCS: Performed by: INTERNAL MEDICINE

## 2021-02-28 PROCEDURE — 6370000000 HC RX 637 (ALT 250 FOR IP)

## 2021-02-28 PROCEDURE — 99223 1ST HOSP IP/OBS HIGH 75: CPT | Performed by: INTERNAL MEDICINE

## 2021-02-28 PROCEDURE — 83540 ASSAY OF IRON: CPT

## 2021-02-28 PROCEDURE — 85014 HEMATOCRIT: CPT

## 2021-02-28 PROCEDURE — 2580000003 HC RX 258: Performed by: INTERNAL MEDICINE

## 2021-02-28 PROCEDURE — APPSS60 APP SPLIT SHARED TIME 46-60 MINUTES: Performed by: NURSE PRACTITIONER

## 2021-02-28 PROCEDURE — 85018 HEMOGLOBIN: CPT

## 2021-02-28 PROCEDURE — 2700000000 HC OXYGEN THERAPY PER DAY

## 2021-02-28 PROCEDURE — 85025 COMPLETE CBC W/AUTO DIFF WBC: CPT

## 2021-02-28 PROCEDURE — 80076 HEPATIC FUNCTION PANEL: CPT

## 2021-02-28 PROCEDURE — 83550 IRON BINDING TEST: CPT

## 2021-02-28 PROCEDURE — 80048 BASIC METABOLIC PNL TOTAL CA: CPT

## 2021-02-28 PROCEDURE — C9113 INJ PANTOPRAZOLE SODIUM, VIA: HCPCS | Performed by: INTERNAL MEDICINE

## 2021-02-28 PROCEDURE — 36415 COLL VENOUS BLD VENIPUNCTURE: CPT

## 2021-02-28 PROCEDURE — 2060000000 HC ICU INTERMEDIATE R&B

## 2021-02-28 RX ADMIN — PANTOPRAZOLE SODIUM 40 MG: 40 INJECTION, POWDER, FOR SOLUTION INTRAVENOUS at 19:47

## 2021-02-28 RX ADMIN — PANTOPRAZOLE SODIUM 40 MG: 40 INJECTION, POWDER, FOR SOLUTION INTRAVENOUS at 08:45

## 2021-02-28 RX ADMIN — DOFETILIDE 250 MCG: 0.25 CAPSULE ORAL at 08:45

## 2021-02-28 RX ADMIN — BISACODYL 10 MG: 5 TABLET, COATED ORAL at 12:47

## 2021-02-28 RX ADMIN — ACETAMINOPHEN 650 MG: 325 TABLET ORAL at 00:40

## 2021-02-28 RX ADMIN — METOPROLOL TARTRATE 12.5 MG: 25 TABLET, FILM COATED ORAL at 11:54

## 2021-02-28 RX ADMIN — SODIUM CHLORIDE, PRESERVATIVE FREE 10 ML: 5 INJECTION INTRAVENOUS at 19:47

## 2021-02-28 RX ADMIN — SODIUM CHLORIDE, PRESERVATIVE FREE 10 ML: 5 INJECTION INTRAVENOUS at 08:45

## 2021-02-28 RX ADMIN — Medication 10 ML: at 12:08

## 2021-02-28 RX ADMIN — PAROXETINE HYDROCHLORIDE 20 MG: 20 TABLET, FILM COATED ORAL at 08:45

## 2021-02-28 RX ADMIN — MAGNESIUM CITRATE 592 ML: 1.75 LIQUID ORAL at 13:36

## 2021-02-28 RX ADMIN — Medication 10 ML: at 19:48

## 2021-02-28 RX ADMIN — ATORVASTATIN CALCIUM 10 MG: 10 TABLET, FILM COATED ORAL at 19:47

## 2021-02-28 RX ADMIN — SODIUM CHLORIDE, PRESERVATIVE FREE 10 ML: 5 INJECTION INTRAVENOUS at 11:56

## 2021-02-28 RX ADMIN — MAGNESIUM CITRATE: 1.75 LIQUID ORAL at 13:46

## 2021-02-28 RX ADMIN — METOPROLOL TARTRATE 12.5 MG: 25 TABLET, FILM COATED ORAL at 19:46

## 2021-02-28 RX ADMIN — DOFETILIDE 250 MCG: 0.25 CAPSULE ORAL at 19:47

## 2021-02-28 ASSESSMENT — PAIN DESCRIPTION - LOCATION: LOCATION: BACK;HEAD

## 2021-02-28 ASSESSMENT — PAIN - FUNCTIONAL ASSESSMENT: PAIN_FUNCTIONAL_ASSESSMENT: ACTIVITIES ARE NOT PREVENTED

## 2021-02-28 ASSESSMENT — PAIN DESCRIPTION - ONSET: ONSET: ON-GOING

## 2021-02-28 ASSESSMENT — PAIN SCALES - GENERAL: PAINLEVEL_OUTOF10: 0

## 2021-02-28 ASSESSMENT — PAIN DESCRIPTION - PAIN TYPE: TYPE: ACUTE PAIN

## 2021-02-28 ASSESSMENT — PAIN DESCRIPTION - PROGRESSION: CLINICAL_PROGRESSION: NOT CHANGED

## 2021-02-28 NOTE — CONSULTS
The above documentation has been prepared under my direction and personally reviewed by me in its entirety. I confirm that the note above accurately reflects all work, treatment, procedures, and medical decision making performed by me. The patient's history was independently obtained. The patient was independently examined. Electrocardiogram, prior and present cardiovascular assessment, and laboratory studies were reviewed. The patient is a 72-year-old white female known to [facility] with primary cardiovascular care provided by Ángela Barrera as well as that of the electrophysiology service with primary care provided by Darlin Oseguera. 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 On the basis of associated sinus node dysfunction, reductions of her beta-blocker therapy have been necessitated. She most recently underwent electrical cardioversion in January, 2021 with a most recent electrocardiogram demonstrating maintenance of sinus rhythm. Most recent objective assessment included that of an echocardiogram of January, 2019 demonstrating evidence of a normal-sized left ventricular chamber with normal left ventricular systolic function with moderate left atrial enlargement and mild mitral regurgitation.   Within the past 3 weeks, she was hospitalized secondary to an upper gastrointestinal hemorrhage on the basis of hemorrhagic gastritis with anticoagulation subsequently resumed but she subsequently has developed recurrent bowel movements consistent with that of hematochezia beginning approximately 10 days following her hospital discharge associated with symptoms of increasing exertional dyspnea a decreasing functional capacity and lightheadedness. On this basis, she presented to the emergency room where anemia was documented with a presenting hemoglobin of 4.3 g/dL with subsequent transfusion totaling 3 L to a hemoglobin of approximately 8 g/dL and with withholding of anticoagulation. In addition, electrocardiographic evidence of atrial fibrillation was documented with acceptable rate control at the time of review of her presenting electrocardiogram associated with that of nonspecific ST changes and presently with suboptimal rate control from review of telemetry. General surgery has been consulted with anticipated further endoscopic evaluation including that of colonoscopy recommended. She denies additional cardiovascular symptoms. At the time of evaluation, her medications and allergies were reviewed as well as that of her past medical history and review of systems. On examination she presently appears in no discomfort nor distress and is hemodynamically stable with vital signs as documented. Jugular venous pressure appears normal with no identified carotid bruits. Lung fields are clear to auscultation. Cardiac examination is notable for an irregular rhythm consistent about of atrial fibrillation with no identified cardiac murmur. A benign abdominal examination is present and no peripheral edema presently noted. Diagnostic Assessment and Plan: On a clinical basis, the patient presents with recurrent episodes of reported hematochezia in the face of recent documented upper gastrointestinal bleeding and a recurrent anemia presently stabilized with transfusion. On this basis appropriate withholding of anticoagulation has occurred in spite of her recent cardioversion and embolic risk pending further evaluation of the surgical service regarding the source of bleeding and additional recommendations.   In this regard, alteration of her oral anticoagulation may be appropriate with a conversion of her rivaroxaban to that of apixaban to reduce risk of bleeding while providing equivalent embolic protection and if additional recurrent bleeding is noted the consideration of a Watchman device which has been discussed at the time of evaluation. Her existing dofetilide will be maintained as well as at least on a short-term basis while in atrial fibrillation the resumption of oral anticoagulation presently without of metoprolol tartrate to assist in maintenance of rate control reduce risk of adverse consequences of tachycardia, especially in light of her previous cardiomyopathy. Thank you for allowing me to participate in your patient's care. Please feel free to contact me if you have any questions or concerns. Christen Chino.  Nadeen Bauer, [address] [facility]

## 2021-02-28 NOTE — PROGRESS NOTES
Progress Note  Date:2021       [id number]  Patient Lupe Wray     YOB: 1945     Age:75 y.o. Subjective    Subjective bp better, bleeding ceased, bp improved  Going for egd, colonoscopy tanja  Tachyvardic, hx paf  No sob or chest pain, no n/v  Review of Systems  Objective         Vitals Last 24 Hours:  TEMPERATURE:  Temp  Av.3 °F (36.8 °C)  Min: 98 °F (36.7 °C)  Max: 98.5 °F (36.9 °C)  RESPIRATIONS RANGE: Resp  Av.4  Min: 18  Max: 40  PULSE OXIMETRY RANGE: SpO2  Av.2 %  Min: 97 %  Max: 100 %  PULSE RANGE: Pulse  Av.8  Min: 85  Max: 148  BLOOD PRESSURE RANGE: Systolic (11QFI), SLQ:875 , Min:98 , HAE:480   ; Diastolic (54GKL), VT, Min:47, Max:72    I/O (24Hr): Intake/Output Summary (Last 24 hours) at 2021 0744  Last data filed at 2021  Gross per 24 hour   Intake 760 ml   Output 400 ml   Net 360 ml     Objective  Labs/Imaging/Diagnostics    Labs:  CBC:  Recent Labs     21  1201 21  1201 21  0530 21  0530 21  1810 21  0030 21  0630   WBC 7.1  --  6.7  --   --   --  6.7   RBC 1.73*  --  3.04*  --   --   --  3.12*   HGB 4.3*   < > 8.1*   < > 8.6* 8.3* 8.3*   HCT 15.7*   < > 27.4*   < > 28.2* 27.3* 28.4*   MCV 90.8  --  90.1  --   --   --  91.0   RDW 18.4*  --  16.6*  --   --   --  17.1*     --  170  --   --   --  157    < > = values in this interval not displayed. CHEMISTRIES:  Recent Labs     21  1201 21  0019 21  0530 21  0630    144 144 139   K 3.4* 3.9 3.7 3.7    105 107 102   CO2 30* 33* 33* 33*   BUN 27* 24* 23 17   CREATININE 1.1* 1.0 0.9 0.8   GLUCOSE 172* 103* 94 96   MG 1.8  --   --   --      PT/INR:  Recent Labs     21  1201   PROTIME 32.2*   INR 2.7     APTT:No results for input(s): APTT in the last 72 hours.   LIVER PROFILE:  Recent Labs     21  1201 21  0530 21  0630   AST 13 22 15   ALT 7 8 6   BILIDIR 0.4* 0.7* 0.5* BILITOT 0.7 1.5* 1.1   ALKPHOS 46 47 54       Imaging Last 24 Hours:  Xr Chest Portable    Result Date: 2/26/2021  EXAMINATION: ONE XRAY VIEW OF THE CHEST 2/26/2021 11:20 am COMPARISON: 30 May 2019 HISTORY: ORDERING SYSTEM PROVIDED HISTORY: GI bleed, tachy TECHNOLOGIST PROVIDED HISTORY: Reason for exam:->GI bleed, tachy FINDINGS: Implanted central venous catheter on the left is unchanged. Heart is enlarged. Pulmonary vascularity is normal.  Lungs are clear. Neither costophrenic angle is blunted. Cardiomegaly.     Awake, alert  Comfortable, calm  Heart appears irr/irr tachycardic  Lungs ctab  abd good bs, soft, no pain  No edema or tremors  Assessment//Plan           Hospital Problems           Last Modified POA    GI bleed 2/26/2021 Yes        Assessment & Plan  Acute on chronic gi bleed for egd, colonoscopy  Acute on chronic blood loss anemia  Afib, tachycardia  Anxiety  Hypotension better  Lab stable, bp better, cardiology to see  Electronically signed by Christie Chu DO on 2/28/21 at 7:44 AM EST

## 2021-03-01 ENCOUNTER — ANESTHESIA (OUTPATIENT)
Dept: ENDOSCOPY | Age: 76
DRG: 377 | End: 2021-03-01
Payer: MEDICARE

## 2021-03-01 ENCOUNTER — ANESTHESIA EVENT (OUTPATIENT)
Dept: ENDOSCOPY | Age: 76
DRG: 377 | End: 2021-03-01
Payer: MEDICARE

## 2021-03-01 VITALS
DIASTOLIC BLOOD PRESSURE: 58 MMHG | SYSTOLIC BLOOD PRESSURE: 126 MMHG | RESPIRATION RATE: 28 BRPM | OXYGEN SATURATION: 99 %

## 2021-03-01 LAB
ALBUMIN SERPL-MCNC: 3.2 G/DL (ref 3.5–5.2)
ALP BLD-CCNC: 59 U/L (ref 35–104)
ALT SERPL-CCNC: 7 U/L (ref 0–32)
ANION GAP SERPL CALCULATED.3IONS-SCNC: 3 MMOL/L (ref 7–16)
ANISOCYTOSIS: ABNORMAL
AST SERPL-CCNC: 16 U/L (ref 0–31)
BASOPHILIC STIPPLING: ABNORMAL
BASOPHILS ABSOLUTE: 0.02 E9/L (ref 0–0.2)
BASOPHILS RELATIVE PERCENT: 0.2 % (ref 0–2)
BILIRUB SERPL-MCNC: 1 MG/DL (ref 0–1.2)
BILIRUBIN DIRECT: 0.4 MG/DL (ref 0–0.3)
BILIRUBIN, INDIRECT: 0.6 MG/DL (ref 0–1)
BUN BLDV-MCNC: 14 MG/DL (ref 8–23)
CALCIUM SERPL-MCNC: 8.4 MG/DL (ref 8.6–10.2)
CHLORIDE BLD-SCNC: 106 MMOL/L (ref 98–107)
CO2: 33 MMOL/L (ref 22–29)
CREAT SERPL-MCNC: 0.8 MG/DL (ref 0.5–1)
EOSINOPHILS ABSOLUTE: 0.15 E9/L (ref 0.05–0.5)
EOSINOPHILS RELATIVE PERCENT: 1.6 % (ref 0–6)
GFR AFRICAN AMERICAN: >60
GFR NON-AFRICAN AMERICAN: >60 ML/MIN/1.73
GLUCOSE BLD-MCNC: 119 MG/DL (ref 74–99)
HCT VFR BLD CALC: 27.9 % (ref 34–48)
HCT VFR BLD CALC: 30.3 % (ref 34–48)
HCT VFR BLD CALC: 31 % (ref 34–48)
HCT VFR BLD CALC: 31.1 % (ref 34–48)
HEMOGLOBIN: 7.9 G/DL (ref 11.5–15.5)
HEMOGLOBIN: 8.5 G/DL (ref 11.5–15.5)
HEMOGLOBIN: 8.7 G/DL (ref 11.5–15.5)
HEMOGLOBIN: 9.1 G/DL (ref 11.5–15.5)
HYPOCHROMIA: ABNORMAL
IMMATURE GRANULOCYTES #: 0.06 E9/L
IMMATURE GRANULOCYTES %: 0.6 % (ref 0–5)
LYMPHOCYTES ABSOLUTE: 1.55 E9/L (ref 1.5–4)
LYMPHOCYTES RELATIVE PERCENT: 16.7 % (ref 20–42)
MCH RBC QN AUTO: 26 PG (ref 26–35)
MCHC RBC AUTO-ENTMCNC: 28 % (ref 32–34.5)
MCV RBC AUTO: 93.1 FL (ref 80–99.9)
MONOCYTES ABSOLUTE: 0.88 E9/L (ref 0.1–0.95)
MONOCYTES RELATIVE PERCENT: 9.5 % (ref 2–12)
NEUTROPHILS ABSOLUTE: 6.6 E9/L (ref 1.8–7.3)
NEUTROPHILS RELATIVE PERCENT: 71.4 % (ref 43–80)
OVALOCYTES: ABNORMAL
PDW BLD-RTO: 17.2 FL (ref 11.5–15)
PLATELET # BLD: 222 E9/L (ref 130–450)
PMV BLD AUTO: 9.3 FL (ref 7–12)
POIKILOCYTES: ABNORMAL
POLYCHROMASIA: ABNORMAL
POTASSIUM REFLEX MAGNESIUM: 3.9 MMOL/L (ref 3.5–5)
RBC # BLD: 3.34 E12/L (ref 3.5–5.5)
SODIUM BLD-SCNC: 142 MMOL/L (ref 132–146)
STOMATOCYTES: ABNORMAL
TARGET CELLS: ABNORMAL
TEAR DROP CELLS: ABNORMAL
TOTAL PROTEIN: 5.4 G/DL (ref 6.4–8.3)
WBC # BLD: 9.3 E9/L (ref 4.5–11.5)

## 2021-03-01 PROCEDURE — 6370000000 HC RX 637 (ALT 250 FOR IP): Performed by: SURGERY

## 2021-03-01 PROCEDURE — 99233 SBSQ HOSP IP/OBS HIGH 50: CPT | Performed by: INTERNAL MEDICINE

## 2021-03-01 PROCEDURE — 2580000003 HC RX 258: Performed by: SURGERY

## 2021-03-01 PROCEDURE — 85025 COMPLETE CBC W/AUTO DIFF WBC: CPT

## 2021-03-01 PROCEDURE — 2060000000 HC ICU INTERMEDIATE R&B

## 2021-03-01 PROCEDURE — 36415 COLL VENOUS BLD VENIPUNCTURE: CPT

## 2021-03-01 PROCEDURE — 3700000000 HC ANESTHESIA ATTENDED CARE: Performed by: SURGERY

## 2021-03-01 PROCEDURE — 85014 HEMATOCRIT: CPT

## 2021-03-01 PROCEDURE — 2580000003 HC RX 258: Performed by: NURSE ANESTHETIST, CERTIFIED REGISTERED

## 2021-03-01 PROCEDURE — 6360000002 HC RX W HCPCS: Performed by: NURSE ANESTHETIST, CERTIFIED REGISTERED

## 2021-03-01 PROCEDURE — 3700000001 HC ADD 15 MINUTES (ANESTHESIA): Performed by: SURGERY

## 2021-03-01 PROCEDURE — 3609027000 HC COLONOSCOPY: Performed by: SURGERY

## 2021-03-01 PROCEDURE — 2700000000 HC OXYGEN THERAPY PER DAY

## 2021-03-01 PROCEDURE — 80076 HEPATIC FUNCTION PANEL: CPT

## 2021-03-01 PROCEDURE — 0DJ08ZZ INSPECTION OF UPPER INTESTINAL TRACT, VIA NATURAL OR ARTIFICIAL OPENING ENDOSCOPIC: ICD-10-PCS | Performed by: SURGERY

## 2021-03-01 PROCEDURE — 2500000003 HC RX 250 WO HCPCS: Performed by: NURSE ANESTHETIST, CERTIFIED REGISTERED

## 2021-03-01 PROCEDURE — 7100000011 HC PHASE II RECOVERY - ADDTL 15 MIN: Performed by: SURGERY

## 2021-03-01 PROCEDURE — 7100000010 HC PHASE II RECOVERY - FIRST 15 MIN: Performed by: SURGERY

## 2021-03-01 PROCEDURE — 2709999900 HC NON-CHARGEABLE SUPPLY: Performed by: SURGERY

## 2021-03-01 PROCEDURE — 85018 HEMOGLOBIN: CPT

## 2021-03-01 PROCEDURE — 0DJD8ZZ INSPECTION OF LOWER INTESTINAL TRACT, VIA NATURAL OR ARTIFICIAL OPENING ENDOSCOPIC: ICD-10-PCS | Performed by: SURGERY

## 2021-03-01 PROCEDURE — 80048 BASIC METABOLIC PNL TOTAL CA: CPT

## 2021-03-01 PROCEDURE — 3609017100 HC EGD: Performed by: SURGERY

## 2021-03-01 RX ORDER — SODIUM CHLORIDE 9 MG/ML
INJECTION, SOLUTION INTRAVENOUS CONTINUOUS PRN
Status: DISCONTINUED | OUTPATIENT
Start: 2021-03-01 | End: 2021-03-01 | Stop reason: SDUPTHER

## 2021-03-01 RX ORDER — SUCRALFATE 1 G/1
1 TABLET ORAL
Status: DISCONTINUED | OUTPATIENT
Start: 2021-03-01 | End: 2021-03-03 | Stop reason: HOSPADM

## 2021-03-01 RX ORDER — LIDOCAINE HYDROCHLORIDE 10 MG/ML
INJECTION, SOLUTION EPIDURAL; INFILTRATION; INTRACAUDAL; PERINEURAL PRN
Status: DISCONTINUED | OUTPATIENT
Start: 2021-03-01 | End: 2021-03-01 | Stop reason: SDUPTHER

## 2021-03-01 RX ORDER — PANTOPRAZOLE SODIUM 40 MG/1
40 TABLET, DELAYED RELEASE ORAL
Status: DISCONTINUED | OUTPATIENT
Start: 2021-03-01 | End: 2021-03-03 | Stop reason: HOSPADM

## 2021-03-01 RX ORDER — PROPOFOL 10 MG/ML
INJECTION, EMULSION INTRAVENOUS PRN
Status: DISCONTINUED | OUTPATIENT
Start: 2021-03-01 | End: 2021-03-01 | Stop reason: SDUPTHER

## 2021-03-01 RX ADMIN — PROPOFOL 40 MG: 10 INJECTION, EMULSION INTRAVENOUS at 08:04

## 2021-03-01 RX ADMIN — METOPROLOL TARTRATE 12.5 MG: 25 TABLET, FILM COATED ORAL at 21:25

## 2021-03-01 RX ADMIN — PANTOPRAZOLE SODIUM 40 MG: 40 TABLET, DELAYED RELEASE ORAL at 10:10

## 2021-03-01 RX ADMIN — SUCRALFATE 1 G: 1 TABLET ORAL at 18:07

## 2021-03-01 RX ADMIN — DOFETILIDE 250 MCG: 0.25 CAPSULE ORAL at 21:28

## 2021-03-01 RX ADMIN — DOFETILIDE 250 MCG: 0.25 CAPSULE ORAL at 10:10

## 2021-03-01 RX ADMIN — Medication 10 ML: at 10:10

## 2021-03-01 RX ADMIN — Medication 10 ML: at 21:29

## 2021-03-01 RX ADMIN — PROPOFOL 60 MG: 10 INJECTION, EMULSION INTRAVENOUS at 07:59

## 2021-03-01 RX ADMIN — PAROXETINE HYDROCHLORIDE 20 MG: 20 TABLET, FILM COATED ORAL at 10:10

## 2021-03-01 RX ADMIN — METOPROLOL TARTRATE 12.5 MG: 25 TABLET, FILM COATED ORAL at 10:10

## 2021-03-01 RX ADMIN — ACETAMINOPHEN 650 MG: 325 TABLET ORAL at 21:29

## 2021-03-01 RX ADMIN — LIDOCAINE HYDROCHLORIDE 20 MG: 10 INJECTION, SOLUTION EPIDURAL; INFILTRATION; INTRACAUDAL; PERINEURAL at 07:59

## 2021-03-01 RX ADMIN — SODIUM CHLORIDE: 9 INJECTION, SOLUTION INTRAVENOUS at 07:57

## 2021-03-01 RX ADMIN — ATORVASTATIN CALCIUM 10 MG: 10 TABLET, FILM COATED ORAL at 21:28

## 2021-03-01 RX ADMIN — SUCRALFATE 1 G: 1 TABLET ORAL at 11:25

## 2021-03-01 ASSESSMENT — PAIN DESCRIPTION - LOCATION: LOCATION: HEAD

## 2021-03-01 ASSESSMENT — PAIN SCALES - GENERAL
PAINLEVEL_OUTOF10: 0
PAINLEVEL_OUTOF10: 5

## 2021-03-01 ASSESSMENT — PAIN DESCRIPTION - PAIN TYPE: TYPE: ACUTE PAIN

## 2021-03-01 ASSESSMENT — PAIN DESCRIPTION - DESCRIPTORS: DESCRIPTORS: HEADACHE;ACHING

## 2021-03-01 ASSESSMENT — PAIN DESCRIPTION - ONSET: ONSET: ON-GOING

## 2021-03-01 ASSESSMENT — ENCOUNTER SYMPTOMS: SHORTNESS OF BREATH: 1

## 2021-03-01 NOTE — ANESTHESIA PRE PROCEDURE
Department of Anesthesiology  Preprocedure Note       Name:  Huma Tejeda   Age:  70 y.o.  :  1945                                          MRN:  [id number]         Date:  3/1/2021      Surgeon: Mykel Staples    Procedure: EGD/COLONOSCOPY    Medications prior to admission:   Prior to Admission medications    Medication Sig Start Date End Date Taking? Authorizing Provider   bumetanide (BUMEX) 1 MG tablet Take 1 mg by mouth as needed Indications: May Take Addional Dose if Needed    Historical Provider, MD   Influenza Vac High-Dose Quad (FLUZONE) 0.7 ML ADELA injection Inject 0.7 mLs into the muscle once *GIVEN AT PCP*    Historical Provider, MD   zoster recombinant adjuvanted vaccine (SHINGRIX) 50 MCG/0.5ML SUSR injection Inject 0.5 mLs into the muscle once *GIVEN AT [address] [place] [address] Nw Provider, MD   OXYGEN Inhale 2 L into the lungs nightly    Historical Provider, MD   potassium chloride (KLOR-CON M) 20 MEQ extended release tablet Take 1 tablet by mouth daily 21   Leo Martin MD   pantoprazole (PROTONIX) 40 MG tablet Take 1 tablet by mouth every morning (before breakfast) 2/10/21   Martha So MD   rivaroxaban (XARELTO) 20 MG TABS tablet Take 1 tablet by mouth Daily with supper 20   Patsy Bradford,    dofetilide (TIKOSYN) 250 MCG capsule Take 1 capsule by mouth every 12 hours 20   Patsy Bradford, DO   bumetanide (BUMEX) 1 MG tablet Take 1 tablet by mouth daily 19   GIOVANNI Durham   lisinopril (PRINIVIL;ZESTRIL) 5 MG tablet Take 1 tablet by mouth daily 19   Estevan Kirk DO   PARoxetine (PAXIL) 20 MG tablet Take 1 tablet by mouth every morning 10/4/17   Melvinia Gaucher, MD   atorvastatin (LIPITOR) 10 MG tablet Take 10 mg by mouth nightly     Historical Provider, MD   ALPRAZolam (XANAX) 0.25 MG tablet Take 0.25 mg by mouth daily as needed for Anxiety.   3/24/16   Historical Provider, MD   calcium carbonate (OSCAL) 500 MG TABS tablet Take 500 mg by mouth every morning Historical Provider, MD       Current medications:    No current facility-administered medications for this visit. No current outpatient medications on file. Facility-Administered Medications Ordered in Other Visits   Medication Dose Route Frequency Provider Last Rate Last Admin    metoprolol tartrate (LOPRESSOR) tablet 12.5 mg  12.5 mg Oral BID Saleem Arnold MD   12.5 mg at 02/28/21 1946    ALPRAZolam Bobbetta Reynaldo) tablet 0.25 mg  0.25 mg Oral Daily PRN Mechelle Munoz MD        atorvastatin (LIPITOR) tablet 10 mg  10 mg Oral Nightly Mechelle Munoz MD   10 mg at 02/1945    dofetilide (TIKOSYN) capsule 250 mcg  250 mcg Oral 2 times per day Mechelle Munoz MD   250 mcg at 02/1945    PARoxetine (PAXIL) tablet 20 mg  20 mg Oral QAM Mechelle Munoz MD   20 mg at 02/28/21 0845    sodium chloride flush 0.9 % injection 10 mL  10 mL Intravenous 2 times per day Mechelle Munoz MD   10 mL at 02/28/21 1948    sodium chloride flush 0.9 % injection 10 mL  10 mL Intravenous PRN Mechelle Munoz MD   10 mL at 02/28/21 1156    acetaminophen (TYLENOL) tablet 650 mg  650 mg Oral Q6H PRN Mechelle Munoz MD   650 mg at 02/28/21 0040    Or    acetaminophen (TYLENOL) suppository 650 mg  650 mg Rectal Q6H PRN Mechelle Munoz MD        pantoprazole (PROTONIX) injection 40 mg  40 mg Intravenous BID Mechelle Munoz MD   40 mg at 02/1945    And    sodium chloride (PF) 0.9 % injection 10 mL  10 mL Intravenous BID Mechelle Munoz MD   10 mL at 02/1945       Allergies:     Allergies   Allergen Reactions    Sulfa Antibiotics        Problem List:    Patient Active Problem List   Diagnosis Code    Adenopathy R59.1    Malignant neoplasm of breast ([facility] C50.919    Atrial fibrillation with RVR (HCC) I48.91    Dyspnea on exertion R06.00    Chronic anticoagulation Z79.01    Iron deficiency anemia D50.9    Breast cancer metastasized to axillary lymph node (HCC) C50.919, C77.3    Atrial flutter ([facility] [address] I48.92    Bilateral pleural effusion J90    Syncope and collapse R55    Breast cancer metastasized to axillary lymph node, right (HCC) C50.911, C77.3    Atrial tachycardia (HCC) I47.1    Rectal bleeding K62.5    GI bleed K92.2       Past Medical History:        Diagnosis Date    AF (atrial fibrillation) (HCC)     Anxiety     Atrial tachycardia ([facility] [address] [facility] 01/14/2021    Breast cancer (HCC)     right chest wall involvement    Depression     Hyperlipidemia     Lymphadenopathy     Osteoporosis     Radiation     Status post chemotherapy     neoadjuvant chemo with AC, Taxol, carboplatin, and Herceptin       Past Surgical History:        Procedure Laterality Date    BRONCHOSCOPY  07/12/2012    ebus    CARDIAC CATHETERIZATION Right 04/03/2014    Dr. Zora Celestin  11/16/2016    CARDIOVERSION  01/14/2021    Successful    (Dr. Priscilla Gold)   [address]      RT - 2010    MASTECTOMY Right 02/06/2009    TONSILLECTOMY      TUNNELED VENOUS PORT PLACEMENT      UPPER GASTROINTESTINAL ENDOSCOPY N/A 2/9/2021    EGD ESOPHAGOGASTRODUODENOSCOPY performed by Eugene Rodriguez MD at [address] [place] [address] Crossing History:    Social History     Tobacco Use    Smoking status: Never Smoker    Smokeless tobacco: Never Used   Substance Use Topics    Alcohol use: No                                Counseling given: Not Answered      Vital Signs (Current): There were no vitals filed for this visit.                                            BP Readings from Last 3 Encounters:   03/01/21 97/64   02/11/21 97/67   02/09/21 90/60       NPO Status:                                                                                 BMI:   Wt Readings from Last 3 Encounters:   03/01/21 170 lb 3.2 oz (77.2 kg)   02/11/21 180 lb (81.6 kg)   01/14/21 170 lb (77.1 kg)     There is no height or weight on file to calculate BMI.    CBC:   Lab Results   Component Value Date    WBC 9.3 03/01/2021    RBC 3.34 03/01/2021    HGB 8.7 03/01/2021    HCT 31.1 03/01/2021    MCV 93.1 03/01/2021    RDW 17.2 03/01/2021     03/01/2021       CMP:   Lab Results   Component Value Date     03/01/2021    K 3.9 03/01/2021     03/01/2021    CO2 33 03/01/2021    BUN 14 03/01/2021    CREATININE 0.8 03/01/2021    GFRAA >60 03/01/2021    LABGLOM >60 03/01/2021    GLUCOSE 119 03/01/2021    GLUCOSE 95 02/20/2012    PROT 5.4 03/01/2021    CALCIUM 8.4 03/01/2021    BILITOT 1.0 03/01/2021    ALKPHOS 59 03/01/2021    AST 16 03/01/2021    ALT 7 03/01/2021       POC Tests: No results for input(s): POCGLU, POCNA, POCK, POCCL, POCBUN, POCHEMO, POCHCT in the last 72 hours. Coags:   Lab Results   Component Value Date    PROTIME 32.2 02/26/2021    INR 2.7 02/26/2021    APTT 35.6 02/08/2021       HCG (If Applicable): No results found for: PREGTESTUR, PREGSERUM, HCG, HCGQUANT     ABGs: No results found for: PHART, PO2ART, OHI0PLZ, YKL9NUG, BEART, C4TGQIGC     Type & Screen (If Applicable):  No results found for: LABABO, LABRH    Drug/Infectious Status (If Applicable):  No results found for: HIV, HEPCAB    COVID-19 Screening (If Applicable):   Lab Results   Component Value Date    COVID19 Not Detected 01/08/2021         Anesthesia Evaluation  Patient summary reviewed no history of anesthetic complications:   Airway: Mallampati: III  TM distance: >3 FB   Neck ROM: full  Mouth opening: > = 3 FB Dental:    (+) upper dentures and lower dentures      Pulmonary: breath sounds clear to auscultation  (+) shortness of breath:                             Cardiovascular:    (+) hypertension: moderate, dysrhythmias (recent cardioversion): atrial fibrillation, LEVIN:, hyperlipidemia      ECG reviewed  Rhythm: irregular  Rate: normal  Echocardiogram reviewed         Beta Blocker:  Dose within 24 Hrs         Neuro/Psych:   (+) psychiatric history:            GI/Hepatic/Renal:        (-) liver disease and no renal disease      ROS comment: Rectal bleeding .    Endo/Other: (+) malignancy/cancer. Abdominal:           Vascular:                                          Anesthesia Plan      MAC     ASA 4       Induction: intravenous. Anesthetic plan and risks discussed with patient.       Plan discussed with CRNA and surgical team.              Chato Mclean MD   3/1/2021      Chato Mclean MD  Staff Anesthesiologist  7:49 AM DISPLAY PLAN FREE TEXT

## 2021-03-01 NOTE — OP NOTE
John Rodriguez  YOB: 1945  [id number]    Pre-operative Diagnosis: GI bleeding    Post-operative Diagnosis: Hemorrhagic gastritis, sigmoid diverticulosis    Procedure: EGD , colonoscopy     Anesthesia: LMAC    Surgeon: Herve Sutton MD    Assistant: None    Complications: none    Specimens: None    EBL: none    Procedure:  Pt was taken to the endoscopy suite and placed on the endoscopy table in a left lateral decubitus position. LMAC anesthesia was administered and a bite block was inserted. A lubricated gastroscope was inserted into the oropharynx and advanced into the esophagus. The esophagus was inspected throughout its length. There were no varices. No evidence of esophagitis. The GE junction was sharp and located at 38 cm. The stomach was entered and insufflated. There was some old blood seen in the gastric lumen. Diffuse mild inflammation was noted especially in the antrum. A couple of small mucosal breaks were seen in the greater curve. No findings to explain bright red blood per rectum. .  The pylorus was intubated. The first and second portions of the duodenum were normal.  The scope was pulled back into the antrum and retroflexed. The angle of the stomach was normal.  The proximal greater and lesser curves were normal.  The fundus was normal.  At the GE junction, there was no evidence of a hiatal hernia. The stomach was deflated and the scope was withdrawn and removed. Next, a digital rectal examination revealed no gross blood, normal tone, no mass was palpated. A lubricated colonoscope was inserted and advanced to the cecum without difficulty. The ileocecal valve and appendiceal orifice were identified and photographed. Prep was good. Cecum, ascending colon, hepatic flexure, transverse colon, splenic flexure, descending colon, sigmoid colon were all extensively inspected. There was diverticular disease of the sigmoid.   The proximal rectum was normal. The distal rectum was normal. The colon was deflated. The scope was removed. The patient tolerated the procedure well. Impression: Findings suggest a diverticular bleed which has since resolved. Unchanged hemorrhagic gastritis    Plan: Continue proton pump inhibitor therapy. Add Carafate.   Okay to resume diet      Herminia Mosley MD 3/1/2021, 8:15 AM

## 2021-03-01 NOTE — PROGRESS NOTES
Progress Note  Date:3/1/2021       QAKJ:[id number]  Patient Azael Angela     YOB: 1945     Age:75 y.o. Subjective    Subjective hr down, hgb 8.7, bmp negative, protein, albumin mildly low  nop abd pain, no n/v or bleeding, no sob  Review of Systems  Objective         Vitals Last 24 Hours:  TEMPERATURE:  Temp  Av.2 °F (36.8 °C)  Min: 98 °F (36.7 °C)  Max: 98.6 °F (37 °C)  RESPIRATIONS RANGE: Resp  Av  Min: 16  Max: 16  PULSE OXIMETRY RANGE: SpO2  Av.3 %  Min: 93 %  Max: 96 %  PULSE RANGE: Pulse  Av.5  Min: 67  Max: 108  BLOOD PRESSURE RANGE: Systolic (05COE), ABM:162 , Min:97 , KX   ; Diastolic (62KFN), RWJ:71, Min:60, Max:77    I/O (24Hr): Intake/Output Summary (Last 24 hours) at 3/1/2021 0707  Last data filed at 3/1/2021 4099  Gross per 24 hour   Intake    Output 250 ml   Net -250 ml     Objective  Labs/Imaging/Diagnostics    Labs:  CBC:  Recent Labs     21  0530 21  0530 21  0630 21  0630 21  1826 21  0020 21  0615   WBC 6.7  --  6.7  --   --   --  9.3   RBC 3.04*  --  3.12*  --   --   --  3.34*   HGB 8.1*   < > 8.3*   < > 9.0* 9.1* 8.7*   HCT 27.4*   < > 28.4*   < > 32.1* 31.0* 31.1*   MCV 90.1  --  91.0  --   --   --  93.1   RDW 16.6*  --  17.1*  --   --   --  17.2*     --  157  --   --   --  222    < > = values in this interval not displayed. CHEMISTRIES:  Recent Labs     21  1201 21  1201 21  0530 21  0630 21  0615      < > 144 139 142   K 3.4*   < > 3.7 3.7 3.9      < > 107 102 106   CO2 30*   < > 33* 33* 33*   BUN 27*   < > 23 17 14   CREATININE 1.1*   < > 0.9 0.8 0.8   GLUCOSE 172*   < > 94 96 119*   MG 1.8  --   --   --   --     < > = values in this interval not displayed. PT/INR:  Recent Labs     21  1201   PROTIME 32.2*   INR 2.7     APTT:No results for input(s): APTT in the last 72 hours.   LIVER PROFILE:  Recent Labs     21  0530 02/28/21  0630 03/01/21  0615   AST 22 15 16   ALT 8 6 7   BILIDIR 0.7* 0.5* 0.4*   BILITOT 1.5* 1.1 1.0   ALKPHOS 47 54 59       Imaging Last 24 Hours:  No results found.     Awake, alert  Calm  Heart rrr  Lungs decreased bs but clear  abd soft, good bs  No edema  Assessment//Plan           Hospital Problems           Last Modified POA    GI bleed 2/26/2021 Yes        Assessment & Plan  Acute on chronic gi bleed for egd, colonoscopy  Acute blood loss anemia  paf  Anxiety  Mild protein calorie malnutrition    Electronically signed by Jennifer Venegas DO on 3/1/21 at 7:07 AM EST

## 2021-03-01 NOTE — ANESTHESIA POSTPROCEDURE EVALUATION
Department of Anesthesiology  Postprocedure Note    Patient: Kaitlyn Gurrola  MRN: [id number]  YOB: 1945  Date of evaluation: 3/1/2021  Time:  8:26 AM     Procedure Summary     Date: 03/01/21 Room / Location: [facility]    Anesthesia Start: 7315 Anesthesia Stop: 0815    Procedures:       EGD ESOPHAGOGASTRODUODENOSCOPY (N/A )      COLONOSCOPY DIAGNOSTIC (N/A ) Diagnosis: (/)    Surgeons: Mayra Chao MD Responsible Provider: Marietta Herbert MD    Anesthesia Type: MAC ASA Status: 4          Anesthesia Type: MAC    [place] Phase I:      [place] Phase II:      Last vitals: Reviewed and per EMR flowsheets.        Anesthesia Post Evaluation    Patient location during evaluation: PACU  Patient participation: complete - patient participated  Level of consciousness: awake and alert  Pain score: 0  Airway patency: patent  Nausea & Vomiting: no vomiting and no nausea  Complications: no  Cardiovascular status: hemodynamically stable  Respiratory status: spontaneous ventilation  Hydration status: stable

## 2021-03-01 NOTE — PROGRESS NOTES
INPATIENT CARDIOLOGY FOLLOW-UP    Name: Ann Albrecht    Age: 70 y.o. Date of Service: 3/1/2021    Chief Complaint: Follow-up for atrial fibrillation    Referring Physician: Alondra Ryan DO    History of Present Illness:  No new overnight cardiac complaints. In and out of atrial fibrillation on telemetry (currently SR at rate 60's). She denies chest pain, SOB, palpitations, orthopnea, or syncope    Review of Systems:   Cardiac: As per HPI  General: No fever, chills  Pulmonary: As per HPI  HEENT: No visual disturbances, difficult swallowing  GI: No nausea, vomiting  : No dysuria, hematuria  Endocrine: No thyroid disease or DM  Musculoskeletal: STEWART x 4, no focal motor deficits  Skin: Intact, no rashes  Neuro: No headache, seizures  Psych: Currently with no depression, anxiety    Past Medical History:  Past Medical History:   Diagnosis Date    AF (atrial fibrillation) (HCC)     Anxiety     Atrial tachycardia ([address] 01/14/2021    [facility]     right chest wall involvement    Depression     Hyperlipidemia     Lymphadenopathy     Osteoporosis     Radiation     Status post chemotherapy     neoadjuvant chemo with AC, Taxol, carboplatin, and Herceptin     Past Surgical History:  Past Surgical History:   Procedure Laterality Date    BRONCHOSCOPY  07/12/2012    ebus    CARDIAC CATHETERIZATION Right 04/03/2014    Dr. Dyllan Pate  11/16/2016    CARDIOVERSION  01/14/2021    Successful    (Dr. Isma Rivera)    MASTECTOMY      RT - 2010    MASTECTOMY Right 02/06/2009    TONSILLECTOMY      TUNNELED VENOUS PORT PLACEMENT      UPPER GASTROINTESTINAL ENDOSCOPY N/A 2/9/2021    EGD ESOPHAGOGASTRODUODENOSCOPY performed by Nabil Terry MD at [facility] ENDOSCOPY       Family History:  Family History   Problem Relation Age of Onset    Cancer Mother         female organs    Cancer Father         bowel?    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   Social Needs    Financial resource strain: Not on file    Food insecurity     Worry: Not on file     Inability: Not on file    Transportation needs     Medical: Not on file     Non-medical: Not on file   Tobacco Use    Smoking status: Never Smoker    Smokeless tobacco: Never Used   Substance and Sexual Activity    Alcohol use: No    Drug use: No    Sexual activity: Never   Lifestyle    Physical activity     Days per week: Not on file     Minutes per session: Not on file    Stress: Not on file   Relationships    Social connections     Talks on phone: Not on file     Gets together: Not on file     Attends [employer] service: Not on file     Active member of club or organization: Not on file     Attends meetings of clubs or organizations: Not on file     Relationship status: Not on file    Intimate partner violence     Fear of current or ex partner: Not on file     Emotionally abused: Not on file     Physically abused: Not on file     Forced sexual activity: Not on file   Other Topics Concern    Not on file   Social History Narrative    Not on file       Allergies: Allergies   Allergen Reactions    Sulfa Antibiotics        Home Medications:  Prior to Admission medications    Medication Sig Start Date End Date Taking?  Authorizing Provider   bumetanide (BUMEX) 1 MG tablet Take 1 mg by mouth as needed Indications: May Take Addional Dose if Needed   Yes Historical Provider, MD   Influenza Vac High-Dose Quad (FLUZONE) 0.7 ML ADELA injection Inject 0.7 mLs into the muscle once *GIVEN AT PCP*   Yes Historical Provider, MD   zoster recombinant adjuvanted vaccine (SHINGRIX) 50 MCG/0.5ML SUSR injection Inject 0.5 mLs into the muscle once *GIVEN AT [address] [place] [address] Provider, MD   OXYGEN Inhale 2 L into the lungs nightly   Yes Historical Provider, MD   potassium chloride (KLOR-CON M) 20 MEQ extended release tablet Take 1 tablet by mouth daily 2/11/21  Yes Maria L Brumfield MD   pantoprazole (PROTONIX) 40 MG tablet Take 1 tablet by mouth every morning (before breakfast) 2/10/21  Yes Sheryle Fix, MD   rivaroxaban (XARELTO) 20 MG TABS tablet Take 1 tablet by mouth Daily with supper 6/2/20  Yes Vince Villalta DO   dofetilide [facility]) 250 MCG capsule Take 1 capsule by mouth every 12 hours 6/2/20  Yes Vince Villalta DO   bumetanide (BUMEX) 1 MG tablet Take 1 tablet by mouth daily 2/22/19  Yes GIOVANNI Barnes   lisinopril (PRINIVIL;ZESTRIL) 5 MG tablet Take 1 tablet by mouth daily 1/19/19  Yes Jennifer Venegas DO   PARoxetine (PAXIL) 20 MG tablet Take 1 tablet by mouth every morning 10/4/17  Yes Olivia Schulz MD   atorvastatin (LIPITOR) 10 MG tablet Take 10 mg by mouth nightly    Yes Historical Provider, MD   ALPRAZolam (XANAX) 0.25 MG tablet Take 0.25 mg by mouth daily as needed for Anxiety.   3/24/16  Yes Historical Provider, MD   calcium carbonate (OSCAL) 500 MG TABS tablet Take 500 mg by mouth every morning    Yes Historical Provider, MD       Current Medications:  Current Facility-Administered Medications   Medication Dose Route Frequency Provider Last Rate Last Admin    sucralfate (CARAFATE) tablet 1 g  1 g Oral TID WC Sheryle Fix, MD        pantoprazole (PROTONIX) tablet 40 mg  40 mg Oral QAM AC Sheryle Fix, MD        metoprolol tartrate (LOPRESSOR) tablet 12.5 mg  12.5 mg Oral BID Sheryle Fix, MD   12.5 mg at 02/28/21 1946    ALPRAZolam [place]) tablet 0.25 mg  0.25 mg Oral Daily PRN Sheryle Fix, MD        atorvastatin (LIPITOR) tablet 10 mg  10 mg Oral Nightly Sheryle Fix, MD   10 mg at 02/1947    dofetilide (TIKOSYN) capsule 250 mcg  250 mcg Oral 2 times per day Sheryle Fix, MD   250 mcg at 02/1947    PARoxetine (PAXIL) tablet 20 mg  20 mg Oral Erendira Haque MD   20 mg at 02/28/21 0845    sodium chloride flush 0.9 % injection 10 mL  10 mL Intravenous 2 times per day Ashley Saldivar MD   10 mL at 02/28/21 1948    sodium chloride flush 0.9 % injection 10 mL  10 mL Intravenous PRN Ashley Saldivar MD   10 mL at 02/28/21 1156    acetaminophen (TYLENOL) tablet 650 mg  650 mg Oral Q6H PRN Ashley Saldivar MD   650 mg at 02/28/21 0040    Or    acetaminophen (TYLENOL) suppository 650 mg  650 mg Rectal Q6H PRN Ashley Saldivar MD             Physical Exam:  BP (!) 105/56   Pulse 60   Temp 98 °F (36.7 °C) (Oral)   Resp 18   Ht 5' 6\" (1.676 m)   Wt 170 lb 3.2 oz (77.2 kg)   SpO2 99%   BMI 27.47 kg/m²   Wt Readings from Last 3 Encounters:   03/01/21 170 lb 3.2 oz (77.2 kg)   02/11/21 180 lb (81.6 kg)   01/14/21 170 lb (77.1 kg)     Appearance: Awake, alert, no acute respiratory distress  Skin: Intact, no rash  Head: Normocephalic, atraumatic  Eyes: EOMI, no conjunctival erythema  ENMT: No pharyngeal erythema, MMM, no rhinorrhea  Neck: Supple, no carotid bruits  Lungs: Clear to auscultation bilaterally. No wheezes, rales, or rhonchi.   Cardiac: Regular rate and rhythm, +S1S2, no murmurs apparent  Abdomen: Soft, nontender, +bowel sounds  Extremities: Moves all extremities x 4, no lower extremity edema  Neurologic: No focal motor deficits apparent, normal mood and affect    Intake/Output:    Intake/Output Summary (Last 24 hours) at 3/1/2021 0941  Last data filed at 3/1/2021 0815  Gross per 24 hour   Intake 100 ml   Output 250 ml   Net -150 ml     I/O this shift:  In: 180 [P.O.:180]  Out: -     Laboratory Tests:  Lab Results   Component Value Date    CREATININE 0.8 03/01/2021    BUN 14 03/01/2021     03/01/2021    K 3.9 03/01/2021     03/01/2021    CO2 33 (H) 03/01/2021     Lab Results   Component Value Date    MG 1.8 02/26/2021     Lab Results   Component Value Date    ALT 7 03/01/2021    AST 16 03/01/2021    ALKPHOS 59 03/01/2021    BILITOT 1.0 03/01/2021 Lab Results   Component Value Date    WBC 9.3 03/01/2021    HGB 8.7 (L) 03/01/2021    HCT 31.1 (L) 03/01/2021    MCV 93.1 03/01/2021     03/01/2021     Lab Results   Component Value Date    TROPONINI <0.01 02/26/2021    TROPONINI <0.01 05/31/2019    TROPONINI 0.02 05/30/2019     Lab Results   Component Value Date    INR 2.7 02/26/2021    INR 1.2 02/11/2021    INR 1.3 02/10/2021    PROTIME 32.2 (H) 02/26/2021    PROTIME 13.6 (H) 02/11/2021    PROTIME 15.8 (H) 02/10/2021     Lab Results   Component Value Date    TSH 2.140 01/15/2019     Lab Results   Component Value Date    CHOL 116 01/16/2019    CHOL 194 05/11/2015     Lab Results   Component Value Date    TRIG 114 01/16/2019    TRIG 166 (H) 05/11/2015     Lab Results   Component Value Date    HDL 30 01/16/2019    HDL 28 05/11/2015     Lab Results   Component Value Date    LDLCALC 63 01/16/2019    LDLCALC 133 (H) 05/11/2015     Lab Results   Component Value Date    LABVLDL 23 01/16/2019    LABVLDL 33 05/11/2015     Cardiac Tests:  Telemetry (3/1/2021): SR, rate 60's (+PAF)    Echocardiogram: 11/2/16 (Dr. Lila Parker)   Left ventricular internal dimensions, wall thickness, regional wall motion   and systolic function are normal.   Ejection fraction is visually estimated at 55%.   There is Doppler evidence for stage III diastolic dysfunction.   The left atrium is severely enlarged as determined by the left atrial volume index.  Waynard Slack is evidence for impaired global right ventricular systolic function.   The right atrium appears to be enlarged.   The aortic valve appears mildly sclerotic.   Mild tricuspid regurgitation is present.   Estimated right ventricular systolic pressure is 40 - 44 mmHg and mildly     Echocardiogram: 1/16/19 (Dr. Bruna Cuevas)   Aortic valve opens well.   No wall motion abnormalities.   Ejection fraction is visually estimated at 60-65%.   Normal right ventricular size and function.   The left atrium is moderately dilated.   Markedly enlarged

## 2021-03-01 NOTE — CARE COORDINATION
CM NOTE: Per QFR--- admitted with gib. +IVF. NPO for EGD. CM attempted to meet with pt earlier but was off unit for EGD. Hx right breast cancer. +AF on xarelto which is on hold for GIB. Cardiology following-- on low dose metoprolol & tikosyn. Readmission---treated recently for GIB. Discharged home & declined [facility]. Readmitted with rectal bleeding. CM spoke with pt to discuss role, anticipated LOS & current plan of care. Reports living with her son & daughter in law in 1 story home. Is able to use steps as needed. Does not use ADs & is not diabetic. Uses O2 2L @ HS---provided by [facility]. No hx EMERSON or HHC. Discussed dx, current medications & TX plan. Reports plan is home at discharge. CM encouraged [facility] but pt wants to discuss with her daughter in law who usually provides assistance. Will continue to follow pt.

## 2021-03-02 LAB
ALBUMIN SERPL-MCNC: 3.1 G/DL (ref 3.5–5.2)
ALP BLD-CCNC: 67 U/L (ref 35–104)
ALT SERPL-CCNC: 6 U/L (ref 0–32)
ANION GAP SERPL CALCULATED.3IONS-SCNC: 5 MMOL/L (ref 7–16)
ANISOCYTOSIS: ABNORMAL
AST SERPL-CCNC: 16 U/L (ref 0–31)
BASOPHILS ABSOLUTE: 0.04 E9/L (ref 0–0.2)
BASOPHILS RELATIVE PERCENT: 0.5 % (ref 0–2)
BILIRUB SERPL-MCNC: 1 MG/DL (ref 0–1.2)
BILIRUBIN DIRECT: 0.5 MG/DL (ref 0–0.3)
BILIRUBIN, INDIRECT: 0.5 MG/DL (ref 0–1)
BUN BLDV-MCNC: 15 MG/DL (ref 8–23)
CALCIUM SERPL-MCNC: 8.3 MG/DL (ref 8.6–10.2)
CHLORIDE BLD-SCNC: 101 MMOL/L (ref 98–107)
CO2: 32 MMOL/L (ref 22–29)
CREAT SERPL-MCNC: 0.9 MG/DL (ref 0.5–1)
EOSINOPHILS ABSOLUTE: 0.26 E9/L (ref 0.05–0.5)
EOSINOPHILS RELATIVE PERCENT: 3 % (ref 0–6)
GFR AFRICAN AMERICAN: >60
GFR NON-AFRICAN AMERICAN: >60 ML/MIN/1.73
GLUCOSE BLD-MCNC: 109 MG/DL (ref 74–99)
HCT VFR BLD CALC: 27.1 % (ref 34–48)
HCT VFR BLD CALC: 27.5 % (ref 34–48)
HCT VFR BLD CALC: 28.2 % (ref 34–48)
HCT VFR BLD CALC: 30 % (ref 34–48)
HEMOGLOBIN: 7.9 G/DL (ref 11.5–15.5)
HEMOGLOBIN: 8.4 G/DL (ref 11.5–15.5)
HYPOCHROMIA: ABNORMAL
IMMATURE GRANULOCYTES #: 0.04 E9/L
IMMATURE GRANULOCYTES %: 0.5 % (ref 0–5)
LYMPHOCYTES ABSOLUTE: 1.98 E9/L (ref 1.5–4)
LYMPHOCYTES RELATIVE PERCENT: 22.8 % (ref 20–42)
MCH RBC QN AUTO: 26.7 PG (ref 26–35)
MCHC RBC AUTO-ENTMCNC: 28 % (ref 32–34.5)
MCV RBC AUTO: 95.3 FL (ref 80–99.9)
MONOCYTES ABSOLUTE: 0.93 E9/L (ref 0.1–0.95)
MONOCYTES RELATIVE PERCENT: 10.7 % (ref 2–12)
NEUTROPHILS ABSOLUTE: 5.45 E9/L (ref 1.8–7.3)
NEUTROPHILS RELATIVE PERCENT: 62.5 % (ref 43–80)
PDW BLD-RTO: 17.5 FL (ref 11.5–15)
PLATELET # BLD: 206 E9/L (ref 130–450)
PMV BLD AUTO: 9.3 FL (ref 7–12)
POLYCHROMASIA: ABNORMAL
POTASSIUM REFLEX MAGNESIUM: 3.7 MMOL/L (ref 3.5–5)
RBC # BLD: 2.96 E12/L (ref 3.5–5.5)
SODIUM BLD-SCNC: 138 MMOL/L (ref 132–146)
TOTAL PROTEIN: 5.6 G/DL (ref 6.4–8.3)
WBC # BLD: 8.7 E9/L (ref 4.5–11.5)

## 2021-03-02 PROCEDURE — 6370000000 HC RX 637 (ALT 250 FOR IP): Performed by: SURGERY

## 2021-03-02 PROCEDURE — 80048 BASIC METABOLIC PNL TOTAL CA: CPT

## 2021-03-02 PROCEDURE — 36415 COLL VENOUS BLD VENIPUNCTURE: CPT

## 2021-03-02 PROCEDURE — 2580000003 HC RX 258: Performed by: SURGERY

## 2021-03-02 PROCEDURE — 85025 COMPLETE CBC W/AUTO DIFF WBC: CPT

## 2021-03-02 PROCEDURE — 36591 DRAW BLOOD OFF VENOUS DEVICE: CPT

## 2021-03-02 PROCEDURE — 80076 HEPATIC FUNCTION PANEL: CPT

## 2021-03-02 PROCEDURE — 85018 HEMOGLOBIN: CPT

## 2021-03-02 PROCEDURE — 2060000000 HC ICU INTERMEDIATE R&B

## 2021-03-02 PROCEDURE — 85014 HEMATOCRIT: CPT

## 2021-03-02 PROCEDURE — 99232 SBSQ HOSP IP/OBS MODERATE 35: CPT | Performed by: INTERNAL MEDICINE

## 2021-03-02 RX ADMIN — SUCRALFATE 1 G: 1 TABLET ORAL at 16:32

## 2021-03-02 RX ADMIN — Medication 10 ML: at 08:53

## 2021-03-02 RX ADMIN — ATORVASTATIN CALCIUM 10 MG: 10 TABLET, FILM COATED ORAL at 20:24

## 2021-03-02 RX ADMIN — PAROXETINE HYDROCHLORIDE 20 MG: 20 TABLET, FILM COATED ORAL at 08:53

## 2021-03-02 RX ADMIN — PANTOPRAZOLE SODIUM 40 MG: 40 TABLET, DELAYED RELEASE ORAL at 08:55

## 2021-03-02 RX ADMIN — SUCRALFATE 1 G: 1 TABLET ORAL at 08:52

## 2021-03-02 RX ADMIN — Medication 10 ML: at 20:23

## 2021-03-02 RX ADMIN — METOPROLOL TARTRATE 12.5 MG: 25 TABLET, FILM COATED ORAL at 20:24

## 2021-03-02 RX ADMIN — DOFETILIDE 250 MCG: 0.25 CAPSULE ORAL at 20:23

## 2021-03-02 RX ADMIN — DOFETILIDE 250 MCG: 0.25 CAPSULE ORAL at 08:52

## 2021-03-02 RX ADMIN — METOPROLOL TARTRATE 12.5 MG: 25 TABLET, FILM COATED ORAL at 08:52

## 2021-03-02 RX ADMIN — SUCRALFATE 1 G: 1 TABLET ORAL at 11:58

## 2021-03-02 ASSESSMENT — PAIN SCALES - GENERAL: PAINLEVEL_OUTOF10: 0

## 2021-03-02 NOTE — CARE COORDINATION
3/2/2021  Social Work Discharge Planning:Pt plan is to return home with son and DIL. Pt is agreeable to have [place] [phone] and has no provider pref. Referral was made to [facility] with [facility]. Pt is on room air and uses 2l HS via [address] [facility] order is needed. Electronically signed by JIMENEZ Forbes on 3/2/2021 at 9:28 AM

## 2021-03-02 NOTE — PLAN OF CARE
Problem: Pain:  Goal: Pain level will decrease  Description: Pain level will decrease  Outcome: Met This Shift  Goal: Control of acute pain  Description: Control of acute pain  Outcome: Met This Shift  Goal: Control of chronic pain  Description: Control of chronic pain  Outcome: Met This Shift     Problem: Falls - Risk of:  Goal: Will remain free from falls  Description: Will remain free from falls  Outcome: Met This Shift  Goal: Absence of physical injury  Description: Absence of physical injury  Outcome: Met This Shift     Problem: Skin Integrity:  Goal: Will show no infection signs and symptoms  Description: Will show no infection signs and symptoms  Outcome: Met This Shift  Goal: Absence of new skin breakdown  Description: Absence of new skin breakdown  Outcome: Met This Shift     Problem: Fluid and Electrolyte Imbalance  Goal: Fluid and electrolyte balance are achieved/maintained  Outcome: Met This Shift     Problem: HH FLUID RETENTION-CHF  Goal: Absence of fluid overload signs and symptoms  Outcome: Met This Shift     Problem: HEMODYNAMIC STATUS  Goal: Hemoglobin within specified parameters  Outcome: Met This Shift     Problem: Bleeding:  Goal: Will show no signs and symptoms of gastrointestinal bleeding  Description: Will show no signs and symptoms of gastrointestinal bleeding  Outcome: Met This Shift     Problem: Cardiac Output - Decreased:  Goal: Cardiac rhythm stable  Outcome: Met This Shift

## 2021-03-02 NOTE — HOME CARE
Referral received for home health. Verified demos and PCP. Patient will need home health orders at discharge.  Thank you for this referral, Camilla Preston 144

## 2021-03-02 NOTE — PROGRESS NOTES
rrr  Lungs ctab  abd soft, good bs, no pain, no edema  Assessment//Plan           Hospital Problems           Last Modified POA    GI bleed 2/26/2021 Yes        Assessment & Plan  Hemorrhagic gastritis  Diverticulosis  Acute on chronic blood loss anemia  Hypotension resolved  paf  Anxiety, stable on meds  Feed, ambulate, dc later if stable  Electronically signed by Harry Lira,  on 3/2/21 at 7:25 AM EST

## 2021-03-02 NOTE — PROGRESS NOTES
INPATIENT CARDIOLOGY FOLLOW-UP    Name: Alex Houston    Age: 70 y.o. Date of Service: 3/2/2021    Chief Complaint: Follow-up for atrial fibrillation    Referring Physician: Alba Rivero DO    History of Present Illness:  No new overnight cardiac complaints. In and out of atrial fibrillation over the past 48 hours on telemetry (currently SR at rate 60's). She denies chest pain, SOB, palpitations, orthopnea, or syncope.     Review of Systems:   Cardiac: As per HPI  General: No fever, chills  Pulmonary: As per HPI  HEENT: No visual disturbances, difficult swallowing  GI: No nausea, vomiting  : No dysuria, hematuria  Endocrine: No thyroid disease or DM  Musculoskeletal: STEWART x 4, no focal motor deficits  Skin: Intact, no rashes  Neuro: No headache, seizures  Psych: Currently with no depression, anxiety    Past Medical History:  Past Medical History:   Diagnosis Date    AF (atrial fibrillation) (HCC)     Anxiety     Atrial tachycardia ([address] 01/14/2021    Breast cancer ([address]     right chest wall involvement    Depression     Hyperlipidemia     Lymphadenopathy     Osteoporosis     Radiation     Status post chemotherapy     neoadjuvant chemo with AC, Taxol, carboplatin, and Herceptin     Past Surgical History:  Past Surgical History:   Procedure Laterality Date    BRONCHOSCOPY  07/12/2012    ebus    CARDIAC CATHETERIZATION Right 04/03/2014    Dr. Dorathy Harada  11/16/2016    CARDIOVERSION  01/14/2021    Successful    (Dr. Nuha Hawk)    COLONOSCOPY N/A 3/1/2021    COLONOSCOPY DIAGNOSTIC performed by Kevin Forte MD at [facility]      RT - 2010    MASTECTOMY Right 02/06/2009    TONSILLECTOMY      TUNNELED VENOUS PORT PLACEMENT      UPPER GASTROINTESTINAL ENDOSCOPY N/A 2/9/2021    EGD ESOPHAGOGASTRODUODENOSCOPY performed by Kevin Forte MD at [facility] N/A 3/1/2021    EGD ESOPHAG[facility]GASTR[facility]OS[facility]       Family History:  Family History   Problem Relation Age of Onset    Cancer Mother         female organs    Cancer Father         bowel? 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   Social Needs    Financial resource strain: Not on file    Food insecurity     Worry: Not on file     Inability: Not on file    Transportation needs     Medical: Not on file     Non-medical: Not on file   Tobacco Use    Smoking status: Never Smoker    Smokeless tobacco: Never Used   Substance and Sexual Activity    Alcohol use: No    Drug use: No    Sexual activity: Never   Lifestyle    Physical activity     Days per week: Not on file     Minutes per session: Not on file    Stress: Not on file   Relationships    Social connections     Talks on phone: Not on file     Gets together: Not on file     Attends [employer] service: Not on file     Active member of club or organization: Not on file     Attends meetings of clubs or organizations: Not on file     Relationship status: Not on file    Intimate partner violence     Fear of current or ex partner: Not on file     Emotionally abused: Not on file     Physically abused: Not on file     Forced sexual activity: Not on file   Other Topics Concern    Not on file   Social History Narrative    Not on file       Allergies: Allergies   Allergen Reactions    Sulfa Antibiotics        Home Medications:  Prior to Admission medications    Medication Sig Start Date End Date Taking?  Authorizing Provider   bumetanide (BUMEX) 1 MG tablet Take 1 mg by mouth as needed Indications: May Take Addional Dose if Needed   Yes Historical Provider, MD   Influenza Vac High-Dose Quad (FLUZONE) 0.7 ML ADELA 03/02/2021     03/02/2021    K 3.7 03/02/2021     03/02/2021    CO2 32 (H) 03/02/2021     Lab Results   Component Value Date    MG 1.8 02/26/2021     Lab Results   Component Value Date    ALT 6 03/02/2021    AST 16 03/02/2021    ALKPHOS 67 03/02/2021    BILITOT 1.0 03/02/2021     Lab Results   Component Value Date    WBC 8.7 03/02/2021    HGB 7.9 (L) 03/02/2021    HCT 28.2 (L) 03/02/2021    MCV 95.3 03/02/2021     03/02/2021     Lab Results   Component Value Date    TROPONINI <0.01 02/26/2021    TROPONINI <0.01 05/31/2019    TROPONINI 0.02 05/30/2019     Lab Results   Component Value Date    INR 2.7 02/26/2021    INR 1.2 02/11/2021    INR 1.3 02/10/2021    PROTIME 32.2 (H) 02/26/2021    PROTIME 13.6 (H) 02/11/2021    PROTIME 15.8 (H) 02/10/2021     Lab Results   Component Value Date    TSH 2.140 01/15/2019     Lab Results   Component Value Date    CHOL 116 01/16/2019    CHOL 194 05/11/2015     Lab Results   Component Value Date    TRIG 114 01/16/2019    TRIG 166 (H) 05/11/2015     Lab Results   Component Value Date    HDL 30 01/16/2019    HDL 28 05/11/2015     Lab Results   Component Value Date    LDLCALC 63 01/16/2019    LDLCALC 133 (H) 05/11/2015     Lab Results   Component Value Date    LABVLDL 23 01/16/2019    LABVLDL 33 05/11/2015     Cardiac Tests:  Telemetry (3/2/2021): SR, rate 60's (+PAF)    Echocardiogram: 11/2/16 (Dr. Anita Noel)   Left ventricular internal dimensions, wall thickness, regional wall motion   and systolic function are normal.   Ejection fraction is visually estimated at 55%.   There is Doppler evidence for stage III diastolic dysfunction.   The left atrium is severely enlarged as determined by the left atrial volume index.  Dayanara Going is evidence for impaired global right ventricular systolic function.   The right atrium appears to be enlarged.   The aortic valve appears mildly sclerotic.   Mild tricuspid regurgitation is present.   Estimated right ventricular systolic pressure is 40 - 44 mmHg and mildly     Echocardiogram: 1/16/19 (Dr. Corona Martinez)   Aortic valve opens well.   No wall motion abnormalities.   Ejection fraction is visually estimated at 60-65%. Normal right ventricular size and function.   The left atrium is moderately dilated.   Markedly enlarged right atrium size.   Probable atrial fibrillation   Mild centrally directed mitral regurgitation.   The aortic valve appears mildly sclerotic.   Moderate tricuspid regurgitation.  RVSP is 68 mmHg.   TR velocity = 4.1 m/s   Pulmonary hypertension is severe . Impression:   1. GI bleed --> hemorrhagic gastritis, sigmoid diverticulosis on 3/1/21 studies  2. Anemia -- Hgb 4.3 on admission --> PRBC's --> Hgb 8.7 --> most recent Hgb 7.9  3. Prior history of syncope / orthostatic hypotension (5/2019) -- +nausea and poor po intake prior to episode; clinically improved; no recent episodes  4. Persistent atrial fibrillation -- on Tikosyn and low dose BB / Kalie Crafts on hold / follows with EP  5. NICM (r/o prior tachycardia-induced CM component) -- EF improved on most recent echocardiograms  6. Negative cardiac catheterization in 4/2014  7. Chronic HFpEF  8. Moderate TR / PHTN  9.  Right-sided breast cancer s/p prior treatment    - Continue low dose metoprolol and Tikosyn  - Monitor QTc on Tikosyn  - Xarelto on hold -- as H/H stabilizes and once ok with surgery, reassess for [facility] [address] [facility] / discussed option of outpatient evaluation for WATCHMAN device  - Keep K > 4 and Mg > 2 / monitor renal function  - Care per surgery    Gino Valentino MD  [facility] ([facility]) Cardiology

## 2021-03-02 NOTE — PROGRESS NOTES
GENERAL SURGERY  DAILY PROGRESS NOTE  3/2/2021    CHIEF COMPLAINT:  Chief Complaint   Patient presents with    Rectal Bleeding     history of rectal bleeding that stopped but restarted one week ago        SUBJECTIVE:  No complaints this AM. No further bleeding  Tolerating diet    OBJECTIVE:  /67   Pulse 63   Temp 97.6 °F (36.4 °C) (Oral)   Resp 18   Ht 5' 6\" (1.676 m)   Wt 175 lb 12.8 oz (79.7 kg)   SpO2 94%   BMI 28.37 kg/m²     GENERAL:  NAD. A&Ox3. LUNGS:  No increased work of breathing. CARDIOVASCULAR: RR  ABDOMEN:  Soft, non-distended, non-tender. No guarding, rigidity, rebound.     ASSESSMENT/PLAN:  70 y.o. female with GIB secondary to resolved diverticular bleed s/p EGD & Colonoscopy 3/1 showing hemorrhagic gastritis and diverticuli  Hgb stable  PPI, Carafate  Diet as tolerated  No further surgical intervention required at this time    Fer Mckinney DO  Resident, PGY-3  3/2/2021  6:45 AM     Seen/examined  Agree with above  JSGadyMD

## 2021-03-03 VITALS
TEMPERATURE: 98 F | HEART RATE: 62 BPM | RESPIRATION RATE: 16 BRPM | HEIGHT: 66 IN | WEIGHT: 170 LBS | BODY MASS INDEX: 27.32 KG/M2 | SYSTOLIC BLOOD PRESSURE: 95 MMHG | DIASTOLIC BLOOD PRESSURE: 50 MMHG | OXYGEN SATURATION: 98 %

## 2021-03-03 LAB
ALBUMIN SERPL-MCNC: 3.1 G/DL (ref 3.5–5.2)
ALP BLD-CCNC: 60 U/L (ref 35–104)
ALT SERPL-CCNC: 6 U/L (ref 0–32)
ANION GAP SERPL CALCULATED.3IONS-SCNC: 4 MMOL/L (ref 7–16)
ANISOCYTOSIS: ABNORMAL
AST SERPL-CCNC: 12 U/L (ref 0–31)
BASOPHILS ABSOLUTE: 0.02 E9/L (ref 0–0.2)
BASOPHILS RELATIVE PERCENT: 0.2 % (ref 0–2)
BILIRUB SERPL-MCNC: 1 MG/DL (ref 0–1.2)
BILIRUBIN DIRECT: 0.4 MG/DL (ref 0–0.3)
BILIRUBIN, INDIRECT: 0.6 MG/DL (ref 0–1)
BUN BLDV-MCNC: 16 MG/DL (ref 8–23)
CALCIUM SERPL-MCNC: 8.3 MG/DL (ref 8.6–10.2)
CHLORIDE BLD-SCNC: 99 MMOL/L (ref 98–107)
CO2: 33 MMOL/L (ref 22–29)
CREAT SERPL-MCNC: 0.9 MG/DL (ref 0.5–1)
EOSINOPHILS ABSOLUTE: 0.22 E9/L (ref 0.05–0.5)
EOSINOPHILS RELATIVE PERCENT: 2.6 % (ref 0–6)
GFR AFRICAN AMERICAN: >60
GFR NON-AFRICAN AMERICAN: >60 ML/MIN/1.73
GLUCOSE BLD-MCNC: 105 MG/DL (ref 74–99)
HCT VFR BLD CALC: 28.3 % (ref 34–48)
HEMOGLOBIN: 8 G/DL (ref 11.5–15.5)
HYPOCHROMIA: ABNORMAL
IMMATURE GRANULOCYTES #: 0.03 E9/L
IMMATURE GRANULOCYTES %: 0.4 % (ref 0–5)
LYMPHOCYTES ABSOLUTE: 1.88 E9/L (ref 1.5–4)
LYMPHOCYTES RELATIVE PERCENT: 22.5 % (ref 20–42)
MCH RBC QN AUTO: 26.5 PG (ref 26–35)
MCHC RBC AUTO-ENTMCNC: 28.3 % (ref 32–34.5)
MCV RBC AUTO: 93.7 FL (ref 80–99.9)
MONOCYTES ABSOLUTE: 0.79 E9/L (ref 0.1–0.95)
MONOCYTES RELATIVE PERCENT: 9.5 % (ref 2–12)
NEUTROPHILS ABSOLUTE: 5.4 E9/L (ref 1.8–7.3)
NEUTROPHILS RELATIVE PERCENT: 64.8 % (ref 43–80)
OVALOCYTES: ABNORMAL
PDW BLD-RTO: 17.8 FL (ref 11.5–15)
PLATELET # BLD: 200 E9/L (ref 130–450)
PMV BLD AUTO: 9.9 FL (ref 7–12)
POIKILOCYTES: ABNORMAL
POLYCHROMASIA: ABNORMAL
POTASSIUM REFLEX MAGNESIUM: 4.1 MMOL/L (ref 3.5–5)
RBC # BLD: 3.02 E12/L (ref 3.5–5.5)
SODIUM BLD-SCNC: 136 MMOL/L (ref 132–146)
TARGET CELLS: ABNORMAL
TOTAL PROTEIN: 5.4 G/DL (ref 6.4–8.3)
WBC # BLD: 8.3 E9/L (ref 4.5–11.5)

## 2021-03-03 PROCEDURE — 80048 BASIC METABOLIC PNL TOTAL CA: CPT

## 2021-03-03 PROCEDURE — 6360000002 HC RX W HCPCS

## 2021-03-03 PROCEDURE — 6370000000 HC RX 637 (ALT 250 FOR IP): Performed by: SURGERY

## 2021-03-03 PROCEDURE — 36591 DRAW BLOOD OFF VENOUS DEVICE: CPT

## 2021-03-03 PROCEDURE — 36415 COLL VENOUS BLD VENIPUNCTURE: CPT

## 2021-03-03 PROCEDURE — 2580000003 HC RX 258: Performed by: SURGERY

## 2021-03-03 PROCEDURE — 99232 SBSQ HOSP IP/OBS MODERATE 35: CPT | Performed by: INTERNAL MEDICINE

## 2021-03-03 PROCEDURE — 80076 HEPATIC FUNCTION PANEL: CPT

## 2021-03-03 PROCEDURE — 85025 COMPLETE CBC W/AUTO DIFF WBC: CPT

## 2021-03-03 RX ORDER — SUCRALFATE 1 G/1
1 TABLET ORAL
Qty: 120 TABLET | Refills: 3 | Status: SHIPPED | OUTPATIENT
Start: 2021-03-03

## 2021-03-03 RX ORDER — HEPARIN SODIUM (PORCINE) LOCK FLUSH IV SOLN 100 UNIT/ML 100 UNIT/ML
SOLUTION INTRAVENOUS
Status: COMPLETED
Start: 2021-03-03 | End: 2021-03-03

## 2021-03-03 RX ORDER — PANTOPRAZOLE SODIUM 40 MG/1
40 TABLET, DELAYED RELEASE ORAL
Qty: 30 TABLET | Refills: 3 | Status: SHIPPED | OUTPATIENT
Start: 2021-03-04

## 2021-03-03 RX ADMIN — PANTOPRAZOLE SODIUM 40 MG: 40 TABLET, DELAYED RELEASE ORAL at 05:04

## 2021-03-03 RX ADMIN — Medication 10 ML: at 08:37

## 2021-03-03 RX ADMIN — DOFETILIDE 250 MCG: 0.25 CAPSULE ORAL at 08:36

## 2021-03-03 RX ADMIN — METOPROLOL TARTRATE 12.5 MG: 25 TABLET, FILM COATED ORAL at 08:36

## 2021-03-03 RX ADMIN — SUCRALFATE 1 G: 1 TABLET ORAL at 08:36

## 2021-03-03 RX ADMIN — SUCRALFATE 1 G: 1 TABLET ORAL at 11:19

## 2021-03-03 RX ADMIN — PAROXETINE HYDROCHLORIDE 20 MG: 20 TABLET, FILM COATED ORAL at 08:36

## 2021-03-03 RX ADMIN — SODIUM CHLORIDE, PRESERVATIVE FREE: 5 INJECTION INTRAVENOUS at 10:22

## 2021-03-03 ASSESSMENT — PAIN SCALES - GENERAL: PAINLEVEL_OUTOF10: 0

## 2021-03-03 NOTE — PROGRESS NOTES
INPATIENT CARDIOLOGY FOLLOW-UP    Name: Sara Gallegos    Age: 70 y.o. Date of Service: 3/3/2021    Chief Complaint: Follow-up for atrial fibrillation    Referring Physician: Veronika Jackson DO    History of Present Illness:  No new overnight cardiac complaints. In and out of atrial fibrillation over the past 3 days on telemetry (currently SR at rate 70's). She denies chest pain, SOB, palpitations, orthopnea, or syncope.     Review of Systems:   Cardiac: As per HPI  General: No fever, chills  Pulmonary: As per HPI  HEENT: No visual disturbances, difficult swallowing  GI: No nausea, vomiting  : No dysuria, hematuria  Endocrine: No thyroid disease or DM  Musculoskeletal: STEWART x 4, no focal motor deficits  Skin: Intact, no rashes  Neuro: No headache, seizures  Psych: Currently with no depression, anxiety    Past Medical History:  Past Medical History:   Diagnosis Date    AF (atrial fibrillation) (HCC)     Anxiety     Atrial tachycardia ([address] 01/14/2021    Breast cancer ([address]     right chest wall involvement    Depression     Hyperlipidemia     Lymphadenopathy     Osteoporosis     Radiation     Status post chemotherapy     neoadjuvant chemo with AC, Taxol, carboplatin, and Herceptin     Past Surgical History:  Past Surgical History:   Procedure Laterality Date    BRONCHOSCOPY  07/12/2012    ebus    CARDIAC CATHETERIZATION Right 04/03/2014    Dr. Yakelin Aleman  11/16/2016    CARDIOVERSION  01/14/2021    Successful    (Dr. Sanjuana Sharpe)    COLONOSCOPY N/A 3/1/2021    COLONOSCOPY DIAGNOSTIC performed by Tay Cotto MD at [address]      RT - 2010    MASTECTOMY Right 02/06/2009    TONSILLECTOMY      TUNNELED VENOUS PORT PLACEMENT      UPPER GASTROINTESTINAL ENDOSCOPY N/A 2/9/2021    EGD ESOPHAGOGASTRODUODENOSCOPY performed by Tay Cotto MD at [address] N/A 3/1/2021    EGD ESOPHAGOGASTRODUODENOSCOPY performed by Tay Cotto MD at [facility] ENDOSCOPY       Family History:  Family History   Problem Relation Age of Onset    Cancer Mother         female organs    Cancer Father         bowel? 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   Social Needs    Financial resource strain: Not on file    Food insecurity     Worry: Not on file     Inability: Not on file    Transportation needs     Medical: Not on file     Non-medical: Not on file   Tobacco Use    Smoking status: Never Smoker    Smokeless tobacco: Never Used   Substance and Sexual Activity    Alcohol use: No    Drug use: No    Sexual activity: Never   Lifestyle    Physical activity     Days per week: Not on file     Minutes per session: Not on file    Stress: Not on file   Relationships    Social connections     Talks on phone: Not on file     Gets together: Not on file     Attends Bahai service: Not on file     Active member of club or organization: Not on file     Attends meetings of clubs or organizations: Not on file     Relationship status: Not on file    Intimate partner violence     Fear of current or ex partner: Not on file     Emotionally abused: Not on file     Physically abused: Not on file     Forced sexual activity: Not on file   Other Topics Concern    Not on file   Social History Narrative    Not on file       Allergies: Allergies   Allergen Reactions    Sulfa Antibiotics        Home Medications:  Prior to Admission medications    Medication Sig Start Date End Date Taking?  Authorizing Provider   metoprolol tartrate (LOPRESSOR) 25 MG tablet Take 0.5 tablets by mouth 2 times daily 3/3/21  Yes Catalino Courtney, DO   pantoprazole (PROTONIX) 40 MG tablet Take 1 tablet by mouth every morning (before breakfast) 3/4/21  Yes Myla Solano DO   sucralfate (CARAFATE) 1 GM tablet Take 1 tablet by mouth 3 times daily (with meals) 3/3/21  Yes Myla Solano DO   bumetanide (BUMEX) 1 MG tablet Take 1 mg by mouth as needed Indications: May Take Addional Dose if Needed   Yes Historical Provider, MD   OXYGEN Inhale 2 L into the lungs nightly   Yes Historical Provider, MD   potassium chloride (KLOR-CON M) 20 MEQ extended release tablet Take 1 tablet by mouth daily 2/11/21  Yes Samantha Bellamy MD   dofetilide formerly [facility]) 250 MCG capsule Take 1 capsule by mouth every 12 hours 6/2/20  Yes Maty Freeman DO   PARoxetine (PAXIL) 20 MG tablet Take 1 tablet by mouth every morning 10/4/17  Yes Darylene Roch, MD   atorvastatin (LIPITOR) 10 MG tablet Take 10 mg by mouth nightly    Yes Historical Provider, MD   ALPRAZolam (XANAX) 0.25 MG tablet Take 0.25 mg by mouth daily as needed for Anxiety.   3/24/16  Yes Historical Provider, MD   calcium carbonate (OSCAL) 500 MG TABS tablet Take 500 mg by mouth every morning    Yes Historical Provider, MD       Current Medications:  Current Facility-Administered Medications   Medication Dose Route Frequency Provider Last Rate Last Admin    heparin flush 100 UNIT/ML injection             sucralfate (CARAFATE) tablet 1 g  1 g Oral TID WC Deepak Underwood MD   1 g at 03/03/21 0836    pantoprazole (PROTONIX) tablet 40 mg  40 mg Oral QAM AC Deepak Underwood MD   40 mg at 03/03/21 0504    metoprolol tartrate (LOPRESSOR) tablet 12.5 mg  12.5 mg Oral BID Deepak Underwood MD   12.5 mg at 03/03/21 3317    ALPRAZolam (XANAX) tablet 0.25 mg  0.25 mg Oral Daily PRN Deepak Underwood MD        atorvastatin (LIPITOR) tablet 10 mg  10 mg Oral Nightly Deepak Underwood MD   10 mg at 03/02/21 2024    dofetilide (TIKOSYN) capsule 250 mcg  250 mcg Oral 2 times per day Deepak Underwood MD   250 mcg at 03/03/21 0836    PARoxetine (PAXIL) tablet 20 mg  20 mg Oral QAM Deepak Underwood MD   20 mg at 03/03/21 9610    sodium chloride flush 0.9 % injection 10 mL  10 mL Intravenous 2 times per day Mayra Chao MD   10 mL at 03/03/21 0837    sodium chloride flush 0.9 % injection 10 mL  10 mL Intravenous PRN Mayra Chao MD   10 mL at 02/28/21 1156    acetaminophen (TYLENOL) tablet 650 mg  650 mg Oral Q6H PRN Mayra Chao MD   650 mg at 03/01/21 2129    Or    acetaminophen (TYLENOL) suppository 650 mg  650 mg Rectal Q6H PRN Mayra Chao MD             Physical Exam:  BP (!) 95/50   Pulse 62   Temp 98 °F (36.7 °C) (Oral)   Resp 16   Ht 5' 6\" (1.676 m)   Wt 170 lb (77.1 kg)   SpO2 98%   BMI 27.44 kg/m²   Wt Readings from Last 3 Encounters:   03/03/21 170 lb (77.1 kg)   02/11/21 180 lb (81.6 kg)   01/14/21 170 lb (77.1 kg)     Appearance: Awake, alert, no acute respiratory distress  Skin: Intact, no rash  Head: Normocephalic, atraumatic  Eyes: EOMI, no conjunctival erythema  ENMT: No pharyngeal erythema, MMM, no rhinorrhea  Neck: Supple, no carotid bruits  Lungs: Clear to auscultation bilaterally. No wheezes, rales, or rhonchi.   Cardiac: Regular rate and rhythm, +S1S2, no murmurs apparent  Abdomen: Soft, nontender, +bowel sounds  Extremities: Moves all extremities x 4, no lower extremity edema  Neurologic: No focal motor deficits apparent, normal mood and affect    Intake/Output:    Intake/Output Summary (Last 24 hours) at 3/3/2021 1003  Last data filed at 3/3/2021 0457  Gross per 24 hour   Intake 790 ml   Output    Net 790 ml     I/O this shift:  In: 180 [P.O.:180]  Out: -     Laboratory Tests:  Lab Results   Component Value Date    CREATININE 0.9 03/03/2021    BUN 16 03/03/2021     03/03/2021    K 4.1 03/03/2021    CL 99 03/03/2021    CO2 33 (H) 03/03/2021     Lab Results   Component Value Date    MG 1.8 02/26/2021     Lab Results   Component Value Date    ALT 6 03/03/2021    AST 12 03/03/2021    ALKPHOS 60 03/03/2021    BILITOT 1.0 03/03/2021     Lab Results   Component Value Date    WBC 8.3 03/03/2021    HGB 8.0 (L) 03/03/2021    HCT 28.3 (L) 03/03/2021    MCV 93.7 03/03/2021     03/03/2021     Lab Results   Component Value Date    TROPONINI <0.01 02/26/2021    TROPONINI <0.01 05/31/2019    TROPONINI 0.02 05/30/2019     Lab Results   Component Value Date    INR 2.7 02/26/2021    INR 1.2 02/11/2021    INR 1.3 02/10/2021    PROTIME 32.2 (H) 02/26/2021    PROTIME 13.6 (H) 02/11/2021    PROTIME 15.8 (H) 02/10/2021     Lab Results   Component Value Date    TSH 2.140 01/15/2019     Lab Results   Component Value Date    CHOL 116 01/16/2019    CHOL 194 05/11/2015     Lab Results   Component Value Date    TRIG 114 01/16/2019    TRIG 166 (H) 05/11/2015     Lab Results   Component Value Date    HDL 30 01/16/2019    HDL 28 05/11/2015     Lab Results   Component Value Date    LDLCALC 63 01/16/2019    LDLCALC 133 (H) 05/11/2015     Lab Results   Component Value Date    LABVLDL 23 01/16/2019    LABVLDL 33 05/11/2015     Cardiac Tests:  Telemetry (3/3/2021): SR, rate 70's (+PAF)    Echocardiogram: 11/2/16 (Dr. Rosie Rawls)   Left ventricular internal dimensions, wall thickness, regional wall motion   and systolic function are normal.   Ejection fraction is visually estimated at 55%.   There is Doppler evidence for stage III diastolic dysfunction.   The left atrium is severely enlarged as determined by the left atrial volume index.  Constantine Iris is evidence for impaired global right ventricular systolic function.   The right atrium appears to be enlarged.   The aortic valve appears mildly sclerotic.   Mild tricuspid regurgitation is present.   Estimated right ventricular systolic pressure is 40 - 44 mmHg and mildly     Echocardiogram: 1/16/19 (Dr. Gissel Field)   Aortic valve opens well.   No wall motion abnormalities.   Ejection fraction is visually estimated at 60-65%.   Normal right ventricular size and function.   The left atrium is moderately dilated.   Markedly enlarged right atrium size.   Probable atrial fibrillation   Mild centrally directed mitral regurgitation.   The aortic valve appears mildly sclerotic.   Moderate tricuspid regurgitation.  RVSP is 68 mmHg.   TR velocity = 4.1 m/s   Pulmonary hypertension is severe . Impression:   1. GI bleed --> hemorrhagic gastritis, sigmoid diverticulosis on 3/1/21 studies  2. Anemia -- Hgb 4.3 on admission --> PRBC's --> 8.7 --> 7.9 --> most recent Hgb 8.0  3. Prior history of syncope / orthostatic hypotension (5/2019) -- +nausea and poor po intake prior to episode; clinically improved; no recent episodes  4. Persistent atrial fibrillation -- on Tikosyn and low dose BB / Elham Hives on hold / follows with EP  5. NICM (r/o prior tachycardia-induced CM component) -- EF improved on most recent echocardiograms  6. Negative cardiac catheterization in 4/2014  7. Chronic HFpEF  8. Moderate TR / PHTN  9.  Right-sided breast cancer s/p prior treatment    - Continue low dose metoprolol and Tikosyn  - Monitor QTc on Tikosyn  - Xarelto on hold -- as H/H stabilizes and once ok with surgery, resume [address] (switch to eliquis) / discussed option of outpatient evaluation for WATCHMAN device  - Keep K > 4 and Mg > 2 / monitor renal function  - Care per surgery    Jorge Naylor MD  [facility]) Cardiology

## 2021-03-03 NOTE — PROGRESS NOTES
CLINICAL PHARMACY NOTE: MEDS TO [facility] [place] [facility] Patient?: No  Total # of Prescriptions Filled: 2   The following medications were delivered to the patient:  · Metoprolol 25 mg  · Sucralfate 1 gm  Total # of Interventions Completed: 6  Time Spent (min): 45    Additional Documentation:transferred protonix to  on [place] per patient request. Refill too soon

## 2021-03-03 NOTE — PROGRESS NOTES
Seen/examined  Doing well  Wants to go home  Denies abdominal pain  Abdomen soft and nondistended  Hemoglobin stable, 8.0  Okay to resume anticoagulation  Discharge okay from surgery  Cortez Fleischer MD

## 2021-03-03 NOTE — PROGRESS NOTES
found.    Awake, alert  Calm  Heart irr/irr controlled  Lungs ct6ab  abd soft, good bs, no pain, no edema  Assessment//Plan           Hospital Problems           Last Modified POA    GI bleed 2/26/2021 Yes        Assessment & Plan  Hemorrhagic gastritis  Afib, paf  Anxiety  Acute on chronic blood loss anemia  Dc home, fu 1 week, no anticoagulation  Electronically signed by Walter Cortez DO on 3/3/21 at 8:07 AM EST

## 2021-03-03 NOTE — CARE COORDINATION
CASE MANAGEMENT. .. Chart reviewed. Discharge and [facility] order noted. [facility] notified of discharge today. IN ADDENDUM. ... 12:00, Met with patient at the bedside. She is ready for discharge and agreeable to the plans.

## 2021-03-08 ENCOUNTER — TELEPHONE (OUTPATIENT)
Dept: VASCULAR SURGERY | Age: 76
End: 2021-03-08

## 2021-03-08 ENCOUNTER — TELEPHONE (OUTPATIENT)
Dept: CARDIOLOGY CLINIC | Age: 76
End: 2021-03-08

## 2021-03-08 NOTE — TELEPHONE ENCOUNTER
Called to confirm appt for 3-9-2021 with Dr Azeb Martinez and she just got out of the hospital so we rescheduled for 4-6-2021.

## 2021-03-18 NOTE — DISCHARGE SUMMARY
[address] [address]                                 DISCHARGE SUMMARY    PATIENT NAME: Jake Pereyra                       :        1945  MED REC NO:   [id number]                            ROOM:       0406  ACCOUNT NO:   [de-identified]                           ADMIT DATE: 2021  PROVIDER:     Jerilyn Archuleta DO                  [facility] DATE: 2021    HISTORY OF CHIEF COMPLAINT:  She is a 56-year-old female with a past medical  history for atrial fibrillation, on anticoagulation, history of breast  cancer, not on any chemo at the present time, rectal bleeding who was just  in the hospital with rectal bleeding. At that time, they did an upper scope  and found hemorrhagic gastritis. The bleeding had stopped, her hemoglobin  stabilized, and she was discharged home. While at home, apparently, she  restarted her anticoagulation and she began having bleeding again. She  called the office to make an appointment because she thought she had a dark  stool again and told to stop her anticoagulation. Her condition worsened  and persisted, and she was sent into the emergency room. She was feeling  very fatigued, dizzy, and lightheaded. In the emergency room, she was  hypertensive, tachycardic. Hemoglobin was 4. She was given 3 units of  blood and admitted to the intensive care unit. She was admitted initially  with acute-on-chronic blood loss anemia, hypertension, GI bleed, paroxysmal  atrial fibrillation, anxiety, depression, history of breast CA. Home meds  were continued. No anticoagulation. Hemoglobin was monitored, and Surgery  was consulted. By the next day, her hemoglobin was up to 8.1, blood  pressure had stabilized. Her lightheadedness had improved. She denied any  chest pain or shortness of breath. Hemoglobin was followed closely. Cardiology was consulted.   They felt that it was appropriate to hold her  anticoagulation due to her condition despite her risk for embolism, and she  may need to go back on her metoprolol for rate control. She was seen by Dr. Jose Martinez and taken for endoscopy on 03/01. Upper endoscopy showed hemorrhagic  gastritis, but no active bleeding and sigmoid diverticulosis without any  masses or bleeding rectally. She was started on p.o. feedings and tolerated  well and, by 03/03, felt that she was stabilized. Hemoglobin had  stabilized. She continued in her AFib with good rate control and felt that  she was well enough to be discharged home. She would be going home with her  family to continue on a low-sodium, no-caffeine diet. Continued on  Lopressor, Protonix, Carafate, Bumex as needed. She uses oxygen 2 liters at  night. Continued on potassium, Tikosyn, Paxil, Lipitor, Xanax p.r.n.,  Os-Giancarlo.  No Xarelto. ACTIVITY:  As tolerated, and follow up in the office in one to two weeks. CONDITION:  Stable but guarded.         Iva Fairchild DO    D: 03/17/2021 8:54:47       T: 03/17/2021 9:01:44     [email or web address]  Job#: [id number]     Doc#: [id number]

## 2021-03-31 ENCOUNTER — TELEPHONE (OUTPATIENT)
Dept: NON INVASIVE DIAGNOSTICS | Age: 76
End: 2021-03-31

## 2021-03-31 NOTE — TELEPHONE ENCOUNTER
I left a VM for Jennifer to call back. I need to switch her OV with Estefany Dan next week to Dr. Dylan Roper next week.

## 2021-04-05 ENCOUNTER — TELEPHONE (OUTPATIENT)
Dept: VASCULAR SURGERY | Age: 76
End: 2021-04-05

## 2021-05-18 DIAGNOSIS — I48.91 ATRIAL FIBRILLATION, UNSPECIFIED TYPE (HCC): ICD-10-CM

## 2021-05-18 RX ORDER — DOFETILIDE 0.25 MG/1
250 CAPSULE ORAL EVERY 12 HOURS SCHEDULED
Qty: 180 CAPSULE | Refills: 1 | Status: SHIPPED
Start: 2021-05-18 | End: 2021-05-25 | Stop reason: ALTCHOICE

## 2021-05-25 ENCOUNTER — OFFICE VISIT (OUTPATIENT)
Dept: NON INVASIVE DIAGNOSTICS | Age: 76
End: 2021-05-25
Payer: MEDICARE

## 2021-05-25 VITALS
WEIGHT: 159.8 LBS | RESPIRATION RATE: 16 BRPM | DIASTOLIC BLOOD PRESSURE: 86 MMHG | HEIGHT: 66 IN | HEART RATE: 98 BPM | BODY MASS INDEX: 25.68 KG/M2 | SYSTOLIC BLOOD PRESSURE: 122 MMHG

## 2021-05-25 DIAGNOSIS — I48.91 ATRIAL FIBRILLATION WITH RVR (HCC): ICD-10-CM

## 2021-05-25 DIAGNOSIS — I48.91 ATRIAL FIBRILLATION, UNSPECIFIED TYPE (HCC): Primary | ICD-10-CM

## 2021-05-25 DIAGNOSIS — I48.19 PERSISTENT ATRIAL FIBRILLATION (HCC): ICD-10-CM

## 2021-05-25 PROCEDURE — G8399 PT W/DXA RESULTS DOCUMENT: HCPCS | Performed by: STUDENT IN AN ORGANIZED HEALTH CARE EDUCATION/TRAINING PROGRAM

## 2021-05-25 PROCEDURE — G8427 DOCREV CUR MEDS BY ELIG CLIN: HCPCS | Performed by: STUDENT IN AN ORGANIZED HEALTH CARE EDUCATION/TRAINING PROGRAM

## 2021-05-25 PROCEDURE — G8417 CALC BMI ABV UP PARAM F/U: HCPCS | Performed by: STUDENT IN AN ORGANIZED HEALTH CARE EDUCATION/TRAINING PROGRAM

## 2021-05-25 PROCEDURE — 4040F PNEUMOC VAC/ADMIN/RCVD: CPT | Performed by: STUDENT IN AN ORGANIZED HEALTH CARE EDUCATION/TRAINING PROGRAM

## 2021-05-25 PROCEDURE — 93000 ELECTROCARDIOGRAM COMPLETE: CPT | Performed by: STUDENT IN AN ORGANIZED HEALTH CARE EDUCATION/TRAINING PROGRAM

## 2021-05-25 PROCEDURE — 1090F PRES/ABSN URINE INCON ASSESS: CPT | Performed by: STUDENT IN AN ORGANIZED HEALTH CARE EDUCATION/TRAINING PROGRAM

## 2021-05-25 PROCEDURE — 1036F TOBACCO NON-USER: CPT | Performed by: STUDENT IN AN ORGANIZED HEALTH CARE EDUCATION/TRAINING PROGRAM

## 2021-05-25 PROCEDURE — 99215 OFFICE O/P EST HI 40 MIN: CPT | Performed by: STUDENT IN AN ORGANIZED HEALTH CARE EDUCATION/TRAINING PROGRAM

## 2021-05-25 PROCEDURE — 1123F ACP DISCUSS/DSCN MKR DOCD: CPT | Performed by: STUDENT IN AN ORGANIZED HEALTH CARE EDUCATION/TRAINING PROGRAM

## 2021-05-25 PROCEDURE — 3017F COLORECTAL CA SCREEN DOC REV: CPT | Performed by: STUDENT IN AN ORGANIZED HEALTH CARE EDUCATION/TRAINING PROGRAM

## 2021-05-25 NOTE — PROGRESS NOTES
Patient was seen in Office today to have 48 Hour Holter Monitor put on per Dr. Kavya Elder. Pt understood use and return of device. #[id number].  Electronically signed by Ted Diaz MA on 5/25/2021 at 11:34 AM

## 2021-05-25 NOTE — PROGRESS NOTES
COLONOSCOPY DIAGNOSTIC performed by [place] Gamboa MD at [address]      RT - 2010    MASTECTOMY Right 02/06/2009    TONSILLECTOMY      TUNNELED VENOUS PORT PLACEMENT      UPPER GASTROINTESTINAL ENDOSCOPY N/A 2/9/2021    EGD ESOPHAGOGASTRODUODENOSCOPY performed by [place] Gamb[place] MD at [facility] N/A 3/1/2021    EGD ESOPHAGOGASTRODUODENOSCOPY performed by West Gamboa MD at [facility] History     Tobacco Use    Smoking status: Never Smoker    Smokeless tobacco: Never Used   Vaping Use    Vaping Use: Never used   Substance Use Topics    Alcohol use: No    Drug use: No      Family History   Problem Relation Age of Onset    Cancer Mother         female organs    Cancer Father         bowel?  Breast Cancer Maternal Aunt       Current Outpatient Medications   Medication Sig Dispense Refill    dofetilide (TIKOSYN) 250 MCG capsule Take 1 capsule by mouth every 12 hours 180 capsule 1    metoprolol tartrate (LOPRESSOR) 25 MG tablet Take 0.5 tablets by mouth 2 times daily 60 tablet 3    pantoprazole (PROTONIX) 40 MG tablet Take 1 tablet by mouth every morning (before breakfast) 30 tablet 3    sucralfate (CARAFATE) 1 GM tablet Take 1 tablet by mouth 3 times daily (with meals) 120 tablet 3    bumetanide (BUMEX) 1 MG tablet Take 1 mg by mouth as needed Indications: May Take Addional Dose if Needed      OXYGEN Inhale 2 L into the lungs nightly      potassium chloride (KLOR-CON M) 20 MEQ extended release tablet Take 1 tablet by mouth daily 60 tablet 3    PARoxetine (PAXIL) 20 MG tablet Take 1 tablet by mouth every morning 90 tablet 1    atorvastatin (LIPITOR) 10 MG tablet Take 10 mg by mouth nightly       ALPRAZolam (XANAX) 0.25 MG tablet Take 0.25 mg by mouth daily as needed for Anxiety.        calcium carbonate (OSCAL) 500 MG TABS tablet Take 500 mg by mouth every morning        No current facility-administered medications for this -CHADSVASC = 3 (age, sex, HFpEF). Xarelto discontinued in 3/2021 due to GI bleed 2/2 gastritis. She was to start apixaban as an outpatient, but it appears this did not happen. She had blood work 2 weeks ago at PCP. I will have my office obtain a copy. I discussed options of [address] vs LAAO. She desires to pursue LAAO. I will refer to Dr Kemi Roca.  -Severe LAE on TTE in 2016.  -Failed sotalol and transitioned to dofetilide in the past. DCCV on 1/14/21 and recurrence of AF/AFL within a few weeks. Remains in atypical AFL. Discontinue dofetilide. Consider rhythm control in future after LAAO. -Continue metoprolol 12.5 mg every 12 hours. -Recommend TTE to assess cardiac structure and function and 48 hour Holter monitor to assess adequacy of rate control. 2. History of TCM  -See above. I spent a total of 50 minutes reviewing previous notes, test results, and face to face with the patient discussing the diagnosis and importance of compliance with the treatment plan as well as documenting on the day of the visit.     Time of the day of service includes:  · Preparing to see the patient (eg. Review of the medical record, such as tests). · Obtaining and/or reviewing separately obtained history. · Ordering tests. · Communicating results to the patient/family/caregiver. · Counseling/educating the patient/family/caregiver. · Documenting clinical information in the patients electronic record. · Coordination of care for the patient. · Performing a medical appropriate exam and/or evaluation    Thank you for allowing me to participate in their care.     Helio Gu DO  Cardiac Electrophysiology  [facility]) Physicians  [facility] [place]: [place] [facility]  10:15 AM  5/25/2021

## 2021-05-25 NOTE — PATIENT INSTRUCTIONS
Stop dofetilide. You will be contacted to schedule echocardiogram.  Complete 48 hour Holter monitor. Referral to Dr Jaci Castaneda for [facility] implant.

## 2021-05-27 ENCOUNTER — TELEPHONE (OUTPATIENT)
Dept: NON INVASIVE DIAGNOSTICS | Age: 76
End: 2021-05-27

## 2021-05-27 NOTE — TELEPHONE ENCOUNTER
Mirna Gurrola RN,  for Pt called to for instructions on how to return holter monitor. MA returned call and provided info needed and device should be returned tomorrow.  Electronically signed by Juju Kelly MA on 5/27/2021 at 12:54 PM

## 2021-05-28 ENCOUNTER — TELEPHONE (OUTPATIENT)
Dept: NON INVASIVE DIAGNOSTICS | Age: 76
End: 2021-05-28

## 2021-05-28 NOTE — TELEPHONE ENCOUNTER
Called the echo dept and scheduled Jennifer's echo. Called and informed Bruce Chad of the date and time of her echo.

## 2021-06-01 DIAGNOSIS — I48.91 ATRIAL FIBRILLATION, UNSPECIFIED TYPE (HCC): ICD-10-CM

## 2021-06-03 ENCOUNTER — TELEPHONE (OUTPATIENT)
Dept: NON INVASIVE DIAGNOSTICS | Age: 76
End: 2021-06-03

## 2021-06-03 DIAGNOSIS — I48.21 PERMANENT ATRIAL FIBRILLATION (HCC): Primary | ICD-10-CM

## 2021-06-03 NOTE — TELEPHONE ENCOUNTER
AF/AFL/AT with RVR. Severe LAE. AF/AFL refractory sotalol and tikosyn. Unable to restore SR with DCCV and tikosyn. Tikosyn stopped. Patient asymptomatic. Some question possible TCM (LVEF 45% on MINE/LV gram during AF) in the past.  TTE pending. I discussed options of titration of AVN inhibitors or implant PPM and AVJ ablation. She desires AVN inhibitor titration for now. Recommend increase metoprolol from 12.5 mg BID to 25 mg BID.

## 2021-06-15 ENCOUNTER — TELEPHONE (OUTPATIENT)
Dept: CARDIOLOGY CLINIC | Age: 76
End: 2021-06-15

## 2021-07-01 ENCOUNTER — HOSPITAL ENCOUNTER (OUTPATIENT)
Dept: CARDIOLOGY | Age: 76
Discharge: HOME | End: 2021-07-01
Payer: MEDICARE

## 2021-07-01 DIAGNOSIS — I48.91 ATRIAL FIBRILLATION, UNSPECIFIED TYPE (HCC): ICD-10-CM

## 2021-07-01 DIAGNOSIS — I48.19 PERSISTENT ATRIAL FIBRILLATION (HCC): ICD-10-CM

## 2021-07-01 LAB
LV EF: 58 %
LVEF MODALITY: NORMAL

## 2021-07-01 PROCEDURE — 93306 TTE W/DOPPLER COMPLETE: CPT | Performed by: PSYCHIATRY & NEUROLOGY

## 2021-07-02 ENCOUNTER — TELEPHONE (OUTPATIENT)
Dept: NON INVASIVE DIAGNOSTICS | Age: 76
End: 2021-07-02

## 2021-07-02 NOTE — TELEPHONE ENCOUNTER
----- Message from Ally Troncoso DO sent at 7/1/2021  2:34 PM EDT -----  Regarding: echo  Please let patient know echo showed normal LVEF.     -Dwight Horner

## 2021-08-11 ENCOUNTER — TELEPHONE (OUTPATIENT)
Dept: CARDIOLOGY CLINIC | Age: 76
End: 2021-08-11

## 2021-08-11 ENCOUNTER — OFFICE VISIT (OUTPATIENT)
Dept: CARDIOLOGY CLINIC | Age: 76
End: 2021-08-11
Payer: MEDICARE

## 2021-08-11 VITALS
WEIGHT: 166.3 LBS | RESPIRATION RATE: 18 BRPM | BODY MASS INDEX: 26.73 KG/M2 | SYSTOLIC BLOOD PRESSURE: 112 MMHG | DIASTOLIC BLOOD PRESSURE: 70 MMHG | HEIGHT: 66 IN | HEART RATE: 94 BPM

## 2021-08-11 DIAGNOSIS — I27.20 PULMONARY HTN (HCC): ICD-10-CM

## 2021-08-11 DIAGNOSIS — Z87.19 HISTORY OF GI BLEED: ICD-10-CM

## 2021-08-11 DIAGNOSIS — I48.19 PERSISTENT ATRIAL FIBRILLATION (HCC): Primary | ICD-10-CM

## 2021-08-11 DIAGNOSIS — I38 VHD (VALVULAR HEART DISEASE): ICD-10-CM

## 2021-08-11 DIAGNOSIS — I42.8 NICM (NONISCHEMIC CARDIOMYOPATHY) (HCC): ICD-10-CM

## 2021-08-11 PROCEDURE — G8417 CALC BMI ABV UP PARAM F/U: HCPCS | Performed by: INTERNAL MEDICINE

## 2021-08-11 PROCEDURE — 1036F TOBACCO NON-USER: CPT | Performed by: INTERNAL MEDICINE

## 2021-08-11 PROCEDURE — 3017F COLORECTAL CA SCREEN DOC REV: CPT | Performed by: INTERNAL MEDICINE

## 2021-08-11 PROCEDURE — 4040F PNEUMOC VAC/ADMIN/RCVD: CPT | Performed by: INTERNAL MEDICINE

## 2021-08-11 PROCEDURE — 1090F PRES/ABSN URINE INCON ASSESS: CPT | Performed by: INTERNAL MEDICINE

## 2021-08-11 PROCEDURE — G8399 PT W/DXA RESULTS DOCUMENT: HCPCS | Performed by: INTERNAL MEDICINE

## 2021-08-11 PROCEDURE — 93000 ELECTROCARDIOGRAM COMPLETE: CPT | Performed by: INTERNAL MEDICINE

## 2021-08-11 PROCEDURE — 1123F ACP DISCUSS/DSCN MKR DOCD: CPT | Performed by: INTERNAL MEDICINE

## 2021-08-11 PROCEDURE — 99214 OFFICE O/P EST MOD 30 MIN: CPT | Performed by: INTERNAL MEDICINE

## 2021-08-11 PROCEDURE — G8427 DOCREV CUR MEDS BY ELIG CLIN: HCPCS | Performed by: INTERNAL MEDICINE

## 2021-08-11 RX ORDER — FERROUS SULFATE 325(65) MG
325 TABLET ORAL
COMMUNITY

## 2021-08-11 RX ORDER — DOFETILIDE 0.25 MG/1
250 CAPSULE ORAL 2 TIMES DAILY
COMMUNITY
End: 2022-01-25

## 2021-08-11 NOTE — PROGRESS NOTES
OUTPATIENT CARDIOLOGY FOLLOW-UP    Name: Keshav Falcon    Age: 70 y.o. Date of Service: 8/11/2021    Chief Complaint: Follow-up for atrial fibrillation/flutter, VHD, NICM    Referring Physician: Jason Murphy DO    History of Present Illness:  Rate controlled atrial fibrillation/flutter on EKG. She denies chest pain, worsening SOB, palpitations, orthopnea, or syncope.     Review of Systems:   Cardiac: As per HPI  General: No fever, chills  Pulmonary: As per HPI  HEENT: No visual disturbances, difficult swallowing  GI: No nausea, vomiting  : No dysuria, hematuria  Endocrine: No thyroid disease or DM  Musculoskeletal: STEWART x 4, no focal motor deficits  Skin: Intact, no rashes  Neuro: No headache, seizures  Psych: Currently with no depression, anxiety    Past Medical History:  Past Medical History:   Diagnosis Date    AF (atrial fibrillation) (HCC)     Anxiety     Atrial tachycardia ([address] 01/14/2021    Breast cancer (HCC)     right chest wall involvement    Depression     Hyperlipidemia     Lymphadenopathy     Osteoporosis     Radiation     Status post chemotherapy     neoadjuvant chemo with AC, Taxol, carboplatin, and Herceptin     Past Surgical History:  Past Surgical History:   Procedure Laterality Date    BRONCHOSCOPY  07/12/2012    ebus    CARDIAC CATHETERIZATION Right 04/03/2014    Dr. Sepideh Krueger  11/16/2016    CARDIOVERSION  01/14/2021    Successful    (Dr. Codie Garcia)    COLONOSCOPY N/A 3/1/2021    COLONOSCOPY DIAGNOSTIC performed by Linda Goode MD at [facility]      RT - 2010    MASTECTOMY Right 02/06/2009    TONSILLECTOMY      TUNNELED VENOUS PORT PLACEMENT      UPPER GASTROINTESTINAL ENDOSCOPY N/A 2/9/2021    EGD ESOPHAGOGASTRODUODENOSCOPY performed by Linda Goode MD at [address] [place] [address] N/A 3/1/2021    EGD ESOPHAGOGASTRODUODENOSCOPY performed by [facility] Good[facility] MD at [facility] ENDOSCOPY       Family History:  Family History   Problem Relation Age of Onset    Cancer Mother         female organs    Cancer Father         bowel? 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   Tobacco Use    Smoking status: Never Smoker    Smokeless tobacco: Never Used   Vaping Use    Vaping Use: Never used   Substance and Sexual Activity    Alcohol use: No    Drug use: No    Sexual activity: Never   Other Topics Concern    Not on file   Social History Narrative    Not on file     Social Determinants of Health     Financial Resource Strain:     Difficulty of Paying Living Expenses:    Food Insecurity:     Worried About Running Out of Food in the Last Year:     [address] in the Last Year:    Transportation Needs:     Lack of Transportation (Medical):  Lack of Transportation (Non-Medical):    Physical Activity:     Days of Exercise per Week:     Minutes of Exercise per Session:    Stress:     Feeling of Stress :    Social Connections:     Frequency of Communication with Friends and Family:     Frequency of Social Gatherings with Friends and Family:     Attends [employer] Services:     Active Member of Clubs or Organizations:     Attends Club or Organization Meetings:     Marital Status:    Intimate Partner Violence:     Fear of Current or Ex-Partner:     Emotionally Abused:     Physically Abused:     Sexually Abused: Allergies: Allergies   Allergen Reactions    Sulfa Antibiotics        Home Medications:  Prior to Admission medications    Medication Sig Start Date End Date Taking?  Authorizing Provider   metoprolol tartrate (LOPRESSOR) 25 MG tablet Take 1 tablet by mouth 2 times daily 6/3/21   Trista Gay DO   pantoprazole Take 500 mg by mouth every morning        No current facility-administered medications for this visit. Physical Exam:  There were no vitals taken for this visit. Wt Readings from Last 3 Encounters:   05/25/21 159 lb 12.8 oz (72.5 kg)   03/03/21 170 lb (77.1 kg)   02/11/21 180 lb (81.6 kg)     Appearance: Awake, alert, no acute respiratory distress  Skin: Intact, no rash  Head: Normocephalic, atraumatic  Eyes: EOMI, no conjunctival erythema  ENMT: No pharyngeal erythema, MMM, no rhinorrhea  Neck: Supple, no carotid bruits  Lungs: Clear to auscultation bilaterally. No wheezes, rales, or rhonchi.   Cardiac: IRRR, no murmurs apparent  Abdomen: Soft, nontender, +bowel sounds  Extremities: Moves all extremities x 4, no lower extremity edema  Neurologic: No focal motor deficits apparent, normal mood and affect    Intake/Output:  No intake or output data in the 24 hours ending 08/11/21 1248  I/O this shift:  In: 180 [P.O.:180]  Out: -     Laboratory Tests:  Lab Results   Component Value Date    CREATININE 0.9 03/03/2021    BUN 16 03/03/2021     03/03/2021    K 4.1 03/03/2021    CL 99 03/03/2021    CO2 33 (H) 03/03/2021     Lab Results   Component Value Date    MG 1.8 02/26/2021     Lab Results   Component Value Date    ALT 6 03/03/2021    AST 12 03/03/2021    ALKPHOS 60 03/03/2021    BILITOT 1.0 03/03/2021     Lab Results   Component Value Date    WBC 8.3 03/03/2021    HGB 8.0 (L) 03/03/2021    HCT 28.3 (L) 03/03/2021    MCV 93.7 03/03/2021     03/03/2021     Lab Results   Component Value Date    TROPONINI <0.01 02/26/2021    TROPONINI <0.01 05/31/2019    TROPONINI 0.02 05/30/2019     Lab Results   Component Value Date    INR 2.7 02/26/2021    INR 1.2 02/11/2021    INR 1.3 02/10/2021    PROTIME 32.2 (H) 02/26/2021    PROTIME 13.6 (H) 02/11/2021    PROTIME 15.8 (H) 02/10/2021     Lab Results   Component Value Date    TSH 2.140 01/15/2019     Lab Results   Component Value Date    CHOL 116 01/16/2019 CHOL 194 05/11/2015     Lab Results   Component Value Date    TRIG 114 01/16/2019    TRIG 166 (H) 05/11/2015     Lab Results   Component Value Date    HDL 30 01/16/2019    HDL 28 05/11/2015     Lab Results   Component Value Date    LDLCALC 63 01/16/2019    LDLCALC 133 (H) 05/11/2015     Lab Results   Component Value Date    LABVLDL 23 01/16/2019    LABVLDL 33 05/11/2015     Cardiac Tests:  EKG (8/11/2021): Atrial fibrillation, rate 94, NSSTT changes    Echocardiogram: 11/2/16 (Dr. Ana Laura Harvey)   Left ventricular internal dimensions, wall thickness, regional wall motion   and systolic function are normal.   Ejection fraction is visually estimated at 55%.   There is Doppler evidence for stage III diastolic dysfunction.   The left atrium is severely enlarged as determined by the left atrial volume index.  Goldie Orly is evidence for impaired global right ventricular systolic function.   The right atrium appears to be enlarged.   The aortic valve appears mildly sclerotic.   Mild tricuspid regurgitation is present.   Estimated right ventricular systolic pressure is 40 - 44 mmHg and mildly     Echocardiogram: 1/16/19 (Dr. Edward Orona)   Aortic valve opens well.   No wall motion abnormalities.   Ejection fraction is visually estimated at 60-65%. Normal right ventricular size and function.   The left atrium is moderately dilated.   Markedly enlarged right atrium size.   Probable atrial fibrillation   Mild centrally directed mitral regurgitation.   The aortic valve appears mildly sclerotic.   Moderate tricuspid regurgitation.  RVSP is 68 mmHg.   TR velocity = 4.1 m/s   Pulmonary hypertension is severe . Echocardiogram: 7/1/21 (Dr. Beatriz Maravilla)   Normal left ventricular systolic function. Ejection fraction is visually estimated at 55-60%. Normal right ventricular size and function (TAPSE 1.8 cm). Indeterminate diastolic function. Severely dilated left atrium by volume index. Moderate mitral regurgitation.    Moderate tricuspid regurgitation. Average PASP is estimated at 61 mmHg. Impression:   1. GI bleed (2/2021 hospitalization) --> hemorrhagic gastritis, sigmoid diverticulosis on 3/1/21 studies  2. Anemia -- Hgb 4.3 on admission (2/2021) --> PRBC's --> 8.7 --> 7.9 --> most recent Hgb 8.0  3. Prior history of syncope / orthostatic hypotension (5/2019) -- +nausea and poor po intake prior to episode; clinically improved; no recent episodes  4. Persistent atrial fibrillation -- Tikosyn recently stopped / currently on BB / no longer on I Do Now I Don't (in the setting of #1) / follows with EP  5. NICM (r/o prior tachycardia-induced CM component) -- EF improved on most recent echocardiograms  6. Negative cardiac catheterization in 4/2014  7. Chronic HFpEF  8. Moderate MR and TR / PHTN  9. Severely dilated LA  10. Right-sided breast cancer s/p prior treatment    - Continue metoprolol  - Follow-up with EP re: treatment of atrial arrhythmia  - Currently off I Do Now I Don't -- work-up for LAAO ongoing  - Keep K > 4 and Mg > 2 / monitor renal function  - Monitor CBC  - Serial echocardiograms    Greater than 30 minutes was spent counseling the patient, reviewing the rationale for the above recommendations and reviewing the patient's current medication list, problem list and results of all previously ordered testing.     Gretchen Gaucher, MD  [facility]) Cardiology

## 2021-08-23 ENCOUNTER — OFFICE VISIT (OUTPATIENT)
Dept: NON INVASIVE DIAGNOSTICS | Age: 76
End: 2021-08-23
Payer: MEDICARE

## 2021-08-23 VITALS
SYSTOLIC BLOOD PRESSURE: 128 MMHG | BODY MASS INDEX: 27.16 KG/M2 | RESPIRATION RATE: 18 BRPM | WEIGHT: 169 LBS | HEIGHT: 66 IN | HEART RATE: 88 BPM | DIASTOLIC BLOOD PRESSURE: 68 MMHG

## 2021-08-23 DIAGNOSIS — I48.91 ATRIAL FIBRILLATION WITH RVR (HCC): Primary | ICD-10-CM

## 2021-08-23 PROCEDURE — 99214 OFFICE O/P EST MOD 30 MIN: CPT | Performed by: SPECIALIST

## 2021-08-23 PROCEDURE — 93000 ELECTROCARDIOGRAM COMPLETE: CPT | Performed by: SPECIALIST

## 2021-08-23 NOTE — PROGRESS NOTES
Mirna Ayala paid a followup visit to the [facility]) Cardiac Electrophysiology office on 8/23/21. She was referred by Dr. Darren Marte to discuss strategies for [facility] SYSTEMS prevention. INTERIM SYMPTOMS  1. Chest discomfort: no  2. Dyspnea: yes - exertional, class III, stable  3. Syncope: no  4. Lightheadedness: yes - orthostatic, mild, stable  5. Palpitation: no  6. Fatigue: yes - pervasive, moderate-severe, stable    SYNOPSIS  1. On the cusp of frailty. 2. Poor metabolic health incurring multiple comorbid conditions. 3. Gastrointestinal bleeding: whilst taking rivaroxaban, which she tells me that she had been taking \"for a couple of years\" prior to bleeding events. Hospitalized with transfusion 2/21 and 3/21. She follows with Alta Verduzco. 4. Breat cancer: active surveillance at this point, under the care of [facility]. 5. Anemia: Hgb 8.0 (normocytic) 3/21. Mechanism(s) uncertain. 6. Syncope: attributed to orthostatic mechanism. No interim occurrence. 7. Cardiomyopathy:  a. Preserved ventricular systolic function  b. Moderate AV valve regurgitation. c. Moderate-to-severe pulmonary artery hypertension. d. Severe left atrial dilation. e. Coronary angiography OK 2014.  8. Intraventricular conduction delay. 9. Atrial fibrillation: functionally persistent. Ambulatory monitoring 6/21 demonstrated average ventricular rate to be 100-110/min. Class III drugs inefficacious. 10. CHADS-VASC at least 3. Not currently receiving antithrombotic therapy. She has never been treated with warfarin. THOUGHTS  1. It is impossible for me to know at this point whether/to what extent the atrial fibrillation is driving above-noted symptoms. 2. I do not know whether she can tolerate any antithrombotic therapy. 3. I do not know enough about the anemia. RECOMMENDATIONS  1. Update Hgb, and if this remains very low discern mechanism(s).   2. Assuming #1 does not contraindicate by definition, give her a trial of warfarin with an INR target 2-2.5.  3. If she tolerates the warfarin, perform MINE-guided cardioversion once INR \"therapeutic\" and initiate amiodarone therapy in an effort to maintain sinus rhythm. 4. Left atrial appendage intervention should be considered only if warfarin therapy proves infeasible, and should recognize real and significant up-front risks of the intervention procedure required to access its theoretical future benefits (stroke prevention). The specific intervention (eg. endocardial versus epicardial) would be predicated on whether she can tolerate even temporary antithrombotic therapy, anatomical/functional features of the appendage. 5. She does not need to follow with me. I will return her to the care of Bharati Alcala, Manny chávez, Cara Prater, and Deepti johnson. I will reach out to Dr. Smith to request that he handle the warfarin management, assuming the anemia does not preclude. Manisha Antoine MD, [facility]      A total of 60 minutes were spent in preparation for the clinic visit, reviewing records/tests, counseling/education of the patient, ordering medications/tests/procedures, coordinating care, and documenting clinical information in the EHR.

## 2021-08-23 NOTE — LETTER
Susy Lobo paid a followup visit to the [facility]) Cardiac Electrophysiology office on 8/23/21. She was referred by Dr. Frankie Keane to discuss strategies for [facility] SYSTEMS prevention. INTERIM SYMPTOMS  1. Chest discomfort: no  2. Dyspnea: yes - exertional, class III, stable  3. Syncope: no  4. Lightheadedness: yes - orthostatic, mild, stable  5. Palpitation: no  6. Fatigue: yes - pervasive, moderate-severe, stable    SYNOPSIS  1. On the cusp of frailty. 2. Poor metabolic health incurring multiple comorbid conditions. 3. Gastrointestinal bleeding: whilst taking rivaroxaban, which she tells me that she had been taking \"for a couple of years\" prior to bleeding events. Hospitalized with transfusion 2/21 and 3/21. She follows with Sanjuana Arriaga. 4. Breat cancer: active surveillance at this point, under the care of [facility]. 5. Anemia: Hgb 8.0 (normocytic) 3/21. Mechanism(s) uncertain. 6. Syncope: attributed to orthostatic mechanism. No interim occurrence. 7. Cardiomyopathy:  a. Preserved ventricular systolic function  b. Moderate AV valve regurgitation. c. Moderate-to-severe pulmonary artery hypertension. d. Severe left atrial dilation. e. Coronary angiography OK 2014.  8. Intraventricular conduction delay. 9. Atrial fibrillation: functionally persistent. Ambulatory monitoring 6/21 demonstrated average ventricular rate to be 100-110/min. Class III drugs inefficacious. 10. CHADS-VASC at least 3. Not currently receiving antithrombotic therapy. She has never been treated with warfarin. THOUGHTS  1. It is impossible for me to know at this point whether/to what extent the atrial fibrillation is driving above-noted symptoms. 2. I do not know whether she can tolerate any antithrombotic therapy. 3. I do not know enough about the anemia. RECOMMENDATIONS  1. Update Hgb, and if this remains very low discern mechanism(s).   2. Assuming #1 does not contraindicate by definition, give her a trial of warfarin with an INR target 2-2.5.  3. If she tolerates the warfarin, perform MINE-guided cardioversion once INR \"therapeutic\" and initiate amiodarone therapy in an effort to maintain sinus rhythm. 4. Left atrial appendage intervention should be considered only if warfarin therapy proves infeasible, and should recognize real and significant up-front risks of the intervention procedure required to access its theoretical future benefits (stroke prevention). The specific intervention (eg. endocardial versus epicardial) would be predicated on whether she can tolerate even temporary antithrombotic therapy, anatomical/functional features of the appendage. 5. She does not need to follow with me. I will return her to the care of Bharati Oseguera, Luis Ramon, Keysha Obregon, and Deepti johnson. I will reach out to Dr. Smith to request that he handle the warfarin management, assuming the anemia does not preclude.       Jovani Bell MD, [facility]

## 2021-09-15 ENCOUNTER — HOSPITAL ENCOUNTER (OUTPATIENT)
Age: 76
Discharge: HOME | End: 2021-09-15
Payer: MEDICARE

## 2021-09-15 LAB
INR BLD: 7.7
PROTHROMBIN TIME: 85.2 SEC (ref 9.3–12.4)

## 2021-09-15 PROCEDURE — 85610 PROTHROMBIN TIME: CPT

## 2021-09-15 PROCEDURE — 36415 COLL VENOUS BLD VENIPUNCTURE: CPT

## 2021-09-16 ENCOUNTER — TELEPHONE (OUTPATIENT)
Dept: NON INVASIVE DIAGNOSTICS | Age: 76
End: 2021-09-16

## 2021-09-16 NOTE — TELEPHONE ENCOUNTER
----- Message from Tu Quijano DO sent at 9/15/2021  9:44 PM EDT -----  Regarding: RE: VKA  Thank you  -Jairo Tejada  ----- Message -----  From: Fayetta Holstein  Sent: 9/7/2021   1:59 PM EDT  To: Tu Quijano DO  Subject: RE: VKA                                          I spoke to patient she just started warfarin last Monday, she has been getting INR's twice a week. I called PCP office and requested copies. ----- Message -----  From: Tu Quijano DO  Sent: 9/7/2021   8:43 AM EDT  To: Fayetta Holstein  Subject: VKA                                              Can you contact patient's PCP to review Dr Elpidio Deal recommendations? I haven't received any messages/labs regarding an update from PCP since Dr Elpidio Deal office note.     -Dr Jeremie Parmar Critical [facility]

## 2021-10-19 ENCOUNTER — APPOINTMENT (OUTPATIENT)
Dept: CT IMAGING | Age: 76
DRG: 177 | End: 2021-10-19
Payer: MEDICARE

## 2021-10-19 ENCOUNTER — HOSPITAL ENCOUNTER (INPATIENT)
Age: 76
LOS: 10 days | Discharge: HOME | DRG: 177 | End: 2021-10-29
Attending: EMERGENCY MEDICINE | Admitting: INTERNAL MEDICINE
Payer: MEDICARE

## 2021-10-19 ENCOUNTER — APPOINTMENT (OUTPATIENT)
Dept: ULTRASOUND IMAGING | Age: 76
DRG: 177 | End: 2021-10-19
Payer: MEDICARE

## 2021-10-19 ENCOUNTER — APPOINTMENT (OUTPATIENT)
Dept: GENERAL RADIOLOGY | Age: 76
DRG: 177 | End: 2021-10-19
Payer: MEDICARE

## 2021-10-19 DIAGNOSIS — R06.00 DYSPNEA AND RESPIRATORY ABNORMALITIES: ICD-10-CM

## 2021-10-19 DIAGNOSIS — U07.1 COVID: Primary | ICD-10-CM

## 2021-10-19 DIAGNOSIS — I48.19 PERSISTENT ATRIAL FIBRILLATION (HCC): ICD-10-CM

## 2021-10-19 DIAGNOSIS — R06.89 DYSPNEA AND RESPIRATORY ABNORMALITIES: ICD-10-CM

## 2021-10-19 DIAGNOSIS — I50.43 ACUTE ON CHRONIC COMBINED SYSTOLIC AND DIASTOLIC CONGESTIVE HEART FAILURE (HCC): ICD-10-CM

## 2021-10-19 PROBLEM — J12.82 PNEUMONIA DUE TO COVID-19 VIRUS: Status: ACTIVE | Noted: 2021-10-19

## 2021-10-19 LAB
ALBUMIN SERPL-MCNC: 3.3 G/DL (ref 3.5–5.2)
ALBUMIN SERPL-MCNC: 3.5 G/DL (ref 3.5–5.2)
ALP BLD-CCNC: 119 U/L (ref 35–104)
ALP BLD-CCNC: 124 U/L (ref 35–104)
ALT SERPL-CCNC: 11 U/L (ref 0–32)
ALT SERPL-CCNC: 8 U/L (ref 0–32)
ANION GAP SERPL CALCULATED.3IONS-SCNC: 13 MMOL/L (ref 7–16)
ANION GAP SERPL CALCULATED.3IONS-SCNC: 9 MMOL/L (ref 7–16)
AST SERPL-CCNC: 22 U/L (ref 0–31)
AST SERPL-CCNC: 26 U/L (ref 0–31)
BASOPHILS ABSOLUTE: 0.01 E9/L (ref 0–0.2)
BASOPHILS ABSOLUTE: 0.02 E9/L (ref 0–0.2)
BASOPHILS RELATIVE PERCENT: 0.2 % (ref 0–2)
BASOPHILS RELATIVE PERCENT: 0.3 % (ref 0–2)
BILIRUB SERPL-MCNC: 2.5 MG/DL (ref 0–1.2)
BILIRUB SERPL-MCNC: 2.8 MG/DL (ref 0–1.2)
BUN BLDV-MCNC: 19 MG/DL (ref 6–23)
BUN BLDV-MCNC: 19 MG/DL (ref 6–23)
CALCIUM SERPL-MCNC: 8.6 MG/DL (ref 8.6–10.2)
CALCIUM SERPL-MCNC: 9 MG/DL (ref 8.6–10.2)
CHLORIDE BLD-SCNC: 91 MMOL/L (ref 98–107)
CHLORIDE BLD-SCNC: 94 MMOL/L (ref 98–107)
CO2: 35 MMOL/L (ref 22–29)
CO2: 36 MMOL/L (ref 22–29)
CREAT SERPL-MCNC: 0.8 MG/DL (ref 0.5–1)
CREAT SERPL-MCNC: 0.9 MG/DL (ref 0.5–1)
D DIMER: <200 NG/ML DDU
EOSINOPHILS ABSOLUTE: 0 E9/L (ref 0.05–0.5)
EOSINOPHILS ABSOLUTE: 0.02 E9/L (ref 0.05–0.5)
EOSINOPHILS RELATIVE PERCENT: 0 % (ref 0–6)
EOSINOPHILS RELATIVE PERCENT: 0.3 % (ref 0–6)
FIBRINOGEN: 508 MG/DL (ref 225–540)
GFR AFRICAN AMERICAN: >60
GFR AFRICAN AMERICAN: >60
GFR NON-AFRICAN AMERICAN: >60 ML/MIN/1.73
GFR NON-AFRICAN AMERICAN: >60 ML/MIN/1.73
GLUCOSE BLD-MCNC: 93 MG/DL (ref 74–99)
GLUCOSE BLD-MCNC: 95 MG/DL (ref 74–99)
HCT VFR BLD CALC: 43.9 % (ref 34–48)
HCT VFR BLD CALC: 45.9 % (ref 34–48)
HEMOGLOBIN: 13.2 G/DL (ref 11.5–15.5)
HEMOGLOBIN: 13.7 G/DL (ref 11.5–15.5)
IMMATURE GRANULOCYTES #: 0.02 E9/L
IMMATURE GRANULOCYTES #: 0.02 E9/L
IMMATURE GRANULOCYTES %: 0.3 % (ref 0–5)
IMMATURE GRANULOCYTES %: 0.4 % (ref 0–5)
INR BLD: 8.1
LACTATE DEHYDROGENASE: 363 U/L (ref 135–214)
LACTIC ACID: 2.1 MMOL/L (ref 0.5–2.2)
LYMPHOCYTES ABSOLUTE: 0.93 E9/L (ref 1.5–4)
LYMPHOCYTES ABSOLUTE: 1.26 E9/L (ref 1.5–4)
LYMPHOCYTES RELATIVE PERCENT: 19.3 % (ref 20–42)
LYMPHOCYTES RELATIVE PERCENT: 21.3 % (ref 20–42)
MAGNESIUM: 1.6 MG/DL (ref 1.6–2.6)
MCH RBC QN AUTO: 28.6 PG (ref 26–35)
MCH RBC QN AUTO: 29.5 PG (ref 26–35)
MCHC RBC AUTO-ENTMCNC: 29.8 % (ref 32–34.5)
MCHC RBC AUTO-ENTMCNC: 30.1 % (ref 32–34.5)
MCV RBC AUTO: 95.8 FL (ref 80–99.9)
MCV RBC AUTO: 98.2 FL (ref 80–99.9)
MONOCYTES ABSOLUTE: 0.27 E9/L (ref 0.1–0.95)
MONOCYTES ABSOLUTE: 1.03 E9/L (ref 0.1–0.95)
MONOCYTES RELATIVE PERCENT: 17.4 % (ref 2–12)
MONOCYTES RELATIVE PERCENT: 5.6 % (ref 2–12)
NEUTROPHILS ABSOLUTE: 3.56 E9/L (ref 1.8–7.3)
NEUTROPHILS ABSOLUTE: 3.6 E9/L (ref 1.8–7.3)
NEUTROPHILS RELATIVE PERCENT: 60.4 % (ref 43–80)
NEUTROPHILS RELATIVE PERCENT: 74.5 % (ref 43–80)
PDW BLD-RTO: 18 FL (ref 11.5–15)
PDW BLD-RTO: 18.2 FL (ref 11.5–15)
PLATELET # BLD: 165 E9/L (ref 130–450)
PLATELET # BLD: 204 E9/L (ref 130–450)
PMV BLD AUTO: 10.1 FL (ref 7–12)
PMV BLD AUTO: 10.6 FL (ref 7–12)
POTASSIUM REFLEX MAGNESIUM: 4.1 MMOL/L (ref 3.5–5)
POTASSIUM SERPL-SCNC: 3.6 MMOL/L (ref 3.5–5)
PRO-BNP: 5044 PG/ML (ref 0–125)
PROTHROMBIN TIME: 89.9 SEC (ref 9.3–12.4)
RBC # BLD: 4.47 E12/L (ref 3.5–5.5)
RBC # BLD: 4.79 E12/L (ref 3.5–5.5)
SARS-COV-2, NAAT: DETECTED
SEDIMENTATION RATE, ERYTHROCYTE: 3 MM/HR (ref 0–20)
SODIUM BLD-SCNC: 139 MMOL/L (ref 132–146)
SODIUM BLD-SCNC: 139 MMOL/L (ref 132–146)
TOTAL PROTEIN: 6.1 G/DL (ref 6.4–8.3)
TOTAL PROTEIN: 6.2 G/DL (ref 6.4–8.3)
TROPONIN, HIGH SENSITIVITY: 36 NG/L (ref 0–9)
TSH SERPL DL<=0.05 MIU/L-ACNC: 1.18 UIU/ML (ref 0.27–4.2)
WBC # BLD: 4.8 E9/L (ref 4.5–11.5)
WBC # BLD: 5.9 E9/L (ref 4.5–11.5)

## 2021-10-19 PROCEDURE — 85378 FIBRIN DEGRADE SEMIQUANT: CPT

## 2021-10-19 PROCEDURE — 84484 ASSAY OF TROPONIN QUANT: CPT

## 2021-10-19 PROCEDURE — 83615 LACTATE (LD) (LDH) ENZYME: CPT

## 2021-10-19 PROCEDURE — 6360000002 HC RX W HCPCS: Performed by: EMERGENCY MEDICINE

## 2021-10-19 PROCEDURE — 2500000003 HC RX 250 WO HCPCS: Performed by: EMERGENCY MEDICINE

## 2021-10-19 PROCEDURE — 84443 ASSAY THYROID STIM HORMONE: CPT

## 2021-10-19 PROCEDURE — 93970 EXTREMITY STUDY: CPT

## 2021-10-19 PROCEDURE — 85025 COMPLETE CBC W/AUTO DIFF WBC: CPT

## 2021-10-19 PROCEDURE — 93005 ELECTROCARDIOGRAM TRACING: CPT | Performed by: PHYSICIAN ASSISTANT

## 2021-10-19 PROCEDURE — 36415 COLL VENOUS BLD VENIPUNCTURE: CPT

## 2021-10-19 PROCEDURE — 83880 ASSAY OF NATRIURETIC PEPTIDE: CPT

## 2021-10-19 PROCEDURE — 6360000002 HC RX W HCPCS: Performed by: INTERNAL MEDICINE

## 2021-10-19 PROCEDURE — 2580000003 HC RX 258: Performed by: INTERNAL MEDICINE

## 2021-10-19 PROCEDURE — 84145 PROCALCITONIN (PCT): CPT

## 2021-10-19 PROCEDURE — 85384 FIBRINOGEN ACTIVITY: CPT

## 2021-10-19 PROCEDURE — 71045 X-RAY EXAM CHEST 1 VIEW: CPT

## 2021-10-19 PROCEDURE — 2500000003 HC RX 250 WO HCPCS: Performed by: INTERNAL MEDICINE

## 2021-10-19 PROCEDURE — 99284 EMERGENCY DEPT VISIT MOD MDM: CPT

## 2021-10-19 PROCEDURE — 82728 ASSAY OF FERRITIN: CPT

## 2021-10-19 PROCEDURE — 83735 ASSAY OF MAGNESIUM: CPT

## 2021-10-19 PROCEDURE — 6370000000 HC RX 637 (ALT 250 FOR IP): Performed by: INTERNAL MEDICINE

## 2021-10-19 PROCEDURE — 87635 SARS-COV-2 COVID-19 AMP PRB: CPT

## 2021-10-19 PROCEDURE — 82248 BILIRUBIN DIRECT: CPT

## 2021-10-19 PROCEDURE — 86140 C-REACTIVE PROTEIN: CPT

## 2021-10-19 PROCEDURE — 86060 ANTISTREPTOLYSIN O TITER: CPT

## 2021-10-19 PROCEDURE — 85651 RBC SED RATE NONAUTOMATED: CPT

## 2021-10-19 PROCEDURE — 80053 COMPREHEN METABOLIC PANEL: CPT

## 2021-10-19 PROCEDURE — 83605 ASSAY OF LACTIC ACID: CPT

## 2021-10-19 PROCEDURE — 85610 PROTHROMBIN TIME: CPT

## 2021-10-19 PROCEDURE — 80061 LIPID PANEL: CPT

## 2021-10-19 PROCEDURE — 70450 CT HEAD/BRAIN W/O DYE: CPT

## 2021-10-19 PROCEDURE — 99223 1ST HOSP IP/OBS HIGH 75: CPT | Performed by: INTERNAL MEDICINE

## 2021-10-19 PROCEDURE — 2060000000 HC ICU INTERMEDIATE R&B

## 2021-10-19 RX ORDER — DOFETILIDE 0.5 MG/1
500 CAPSULE ORAL 2 TIMES DAILY
Status: DISCONTINUED | OUTPATIENT
Start: 2021-10-19 | End: 2021-10-24

## 2021-10-19 RX ORDER — BUMETANIDE 0.25 MG/ML
1 INJECTION, SOLUTION INTRAMUSCULAR; INTRAVENOUS ONCE
Status: COMPLETED | OUTPATIENT
Start: 2021-10-19 | End: 2021-10-19

## 2021-10-19 RX ORDER — ZINC SULFATE 50(220)MG
50 CAPSULE ORAL 2 TIMES DAILY
Status: DISCONTINUED | OUTPATIENT
Start: 2021-10-19 | End: 2021-10-29 | Stop reason: HOSPADM

## 2021-10-19 RX ORDER — PAROXETINE HYDROCHLORIDE 20 MG/1
20 TABLET, FILM COATED ORAL EVERY MORNING
Status: DISCONTINUED | OUTPATIENT
Start: 2021-10-20 | End: 2021-10-29 | Stop reason: HOSPADM

## 2021-10-19 RX ORDER — ASCORBIC ACID 500 MG
1000 TABLET ORAL 2 TIMES DAILY
Status: DISCONTINUED | OUTPATIENT
Start: 2021-10-19 | End: 2021-10-29 | Stop reason: HOSPADM

## 2021-10-19 RX ORDER — SODIUM CHLORIDE 0.9 % (FLUSH) 0.9 %
5-40 SYRINGE (ML) INJECTION EVERY 12 HOURS SCHEDULED
Status: DISCONTINUED | OUTPATIENT
Start: 2021-10-19 | End: 2021-10-29 | Stop reason: HOSPADM

## 2021-10-19 RX ORDER — SUCRALFATE 1 G/1
1 TABLET ORAL
Status: DISCONTINUED | OUTPATIENT
Start: 2021-10-20 | End: 2021-10-29 | Stop reason: HOSPADM

## 2021-10-19 RX ORDER — SODIUM CHLORIDE 9 MG/ML
25 INJECTION, SOLUTION INTRAVENOUS PRN
Status: DISCONTINUED | OUTPATIENT
Start: 2021-10-19 | End: 2021-10-29 | Stop reason: HOSPADM

## 2021-10-19 RX ORDER — POLYETHYLENE GLYCOL 3350 17 G/17G
17 POWDER, FOR SOLUTION ORAL DAILY PRN
Status: DISCONTINUED | OUTPATIENT
Start: 2021-10-19 | End: 2021-10-29 | Stop reason: HOSPADM

## 2021-10-19 RX ORDER — ACETAMINOPHEN 650 MG/1
650 SUPPOSITORY RECTAL EVERY 6 HOURS PRN
Status: DISCONTINUED | OUTPATIENT
Start: 2021-10-19 | End: 2021-10-29 | Stop reason: HOSPADM

## 2021-10-19 RX ORDER — SODIUM CHLORIDE 0.9 % (FLUSH) 0.9 %
5-40 SYRINGE (ML) INJECTION PRN
Status: DISCONTINUED | OUTPATIENT
Start: 2021-10-19 | End: 2021-10-29 | Stop reason: HOSPADM

## 2021-10-19 RX ORDER — BENZONATATE 100 MG/1
100 CAPSULE ORAL 3 TIMES DAILY PRN
Status: DISCONTINUED | OUTPATIENT
Start: 2021-10-19 | End: 2021-10-29 | Stop reason: HOSPADM

## 2021-10-19 RX ORDER — DEXAMETHASONE SODIUM PHOSPHATE 10 MG/ML
6 INJECTION INTRAMUSCULAR; INTRAVENOUS EVERY 24 HOURS
Status: DISCONTINUED | OUTPATIENT
Start: 2021-10-20 | End: 2021-10-29 | Stop reason: HOSPADM

## 2021-10-19 RX ORDER — BUMETANIDE 0.25 MG/ML
1 INJECTION, SOLUTION INTRAMUSCULAR; INTRAVENOUS 2 TIMES DAILY
Status: DISCONTINUED | OUTPATIENT
Start: 2021-10-20 | End: 2021-10-25

## 2021-10-19 RX ORDER — ACETAMINOPHEN 325 MG/1
650 TABLET ORAL EVERY 6 HOURS PRN
Status: DISCONTINUED | OUTPATIENT
Start: 2021-10-19 | End: 2021-10-29 | Stop reason: HOSPADM

## 2021-10-19 RX ORDER — DEXAMETHASONE SODIUM PHOSPHATE 10 MG/ML
10 INJECTION INTRAMUSCULAR; INTRAVENOUS EVERY 6 HOURS
Status: DISCONTINUED | OUTPATIENT
Start: 2021-10-19 | End: 2021-10-19

## 2021-10-19 RX ORDER — FERROUS SULFATE 325(65) MG
325 TABLET ORAL
Status: DISCONTINUED | OUTPATIENT
Start: 2021-10-20 | End: 2021-10-29 | Stop reason: HOSPADM

## 2021-10-19 RX ORDER — VITAMIN B COMPLEX
2000 TABLET ORAL DAILY
Status: DISCONTINUED | OUTPATIENT
Start: 2021-10-20 | End: 2021-10-29 | Stop reason: HOSPADM

## 2021-10-19 RX ORDER — PANTOPRAZOLE SODIUM 40 MG/1
40 TABLET, DELAYED RELEASE ORAL
Status: DISCONTINUED | OUTPATIENT
Start: 2021-10-20 | End: 2021-10-29 | Stop reason: HOSPADM

## 2021-10-19 RX ORDER — ATORVASTATIN CALCIUM 10 MG/1
10 TABLET, FILM COATED ORAL NIGHTLY
Status: DISCONTINUED | OUTPATIENT
Start: 2021-10-19 | End: 2021-10-29 | Stop reason: HOSPADM

## 2021-10-19 RX ADMIN — DOXYCYCLINE 100 MG: 100 INJECTION, POWDER, LYOPHILIZED, FOR SOLUTION INTRAVENOUS at 22:33

## 2021-10-19 RX ADMIN — ATORVASTATIN CALCIUM 10 MG: 10 TABLET, FILM COATED ORAL at 22:31

## 2021-10-19 RX ADMIN — Medication 10 ML: at 22:34

## 2021-10-19 RX ADMIN — Medication 1000 MG: at 22:32

## 2021-10-19 RX ADMIN — ZINC SULFATE 220 MG (50 MG) CAPSULE 50 MG: CAPSULE at 22:33

## 2021-10-19 RX ADMIN — DOFETILIDE 500 MCG: 0.5 CAPSULE ORAL at 22:29

## 2021-10-19 RX ADMIN — DEXAMETHASONE SODIUM PHOSPHATE 10 MG: 10 INJECTION INTRAMUSCULAR; INTRAVENOUS at 19:23

## 2021-10-19 RX ADMIN — WATER 1000 MG: 1 INJECTION INTRAMUSCULAR; INTRAVENOUS; SUBCUTANEOUS at 22:32

## 2021-10-19 RX ADMIN — BUMETANIDE 1 MG: 0.25 INJECTION, SOLUTION INTRAMUSCULAR; INTRAVENOUS at 19:23

## 2021-10-19 ASSESSMENT — PAIN SCALES - GENERAL: PAINLEVEL_OUTOF10: 0

## 2021-10-19 NOTE — ED NOTES
FIRST PROVIDER CONTACT ASSESSMENT NOTE      Department of Emergency Medicine   10/19/21  4:58 PM EDT    Chief Complaint: Leg Swelling (BLE Hx CHF), Shortness of Breath, and Hallucinations (x2-3 Weeks \"I see people, I see animals\")      History of Present Illness:   Angus Chavez is a 70 y.o. female who presents to the ED for bilateral leg swelling, SOB, and hallucinations for 2-3 weeks. Pt saw PCP who sent her in. Her son notes that she has been seeing people and animals for the past 3 weeks. She states she hit her head 2 weeks ago. She is not on any blood thinners. However, her son states that she has been having the hallucinations before she hit her head. She states that both of her legs have been swelling. She states that they are normally swollen come and go. She states that now they are seeping. She states that she also has some shortness of breath. Denies any chest pain. Not on any blood thinners. Denies headache, vision changes. Medical History:  has a past medical history of AF (atrial fibrillation) ([address] Anxiety, Atrial tachycardia ([address] Breast cancer ([address] Depression, Hyperlipidemia, Lymphadenopathy, Osteoporosis, Radiation, and Status post chemotherapy. Surgical History:  has a past surgical history that includes Tunneled venous port placement; Tonsillectomy; bronchoscopy (07/12/2012); Mastectomy; Cardiac catheterization (Right, 04/03/2014); Cardioversion (11/16/2016); Mastectomy (Right, 02/06/2009); Cardioversion (01/14/2021); Upper gastrointestinal endoscopy (N/A, 2/9/2021); Upper gastrointestinal endoscopy (N/A, 3/1/2021); and Colonoscopy (N/A, 3/1/2021). Social History:  reports that she has never smoked. She has never used smokeless tobacco. She reports that she does not drink alcohol and does not use drugs. Family History: family history includes Breast Cancer in her maternal aunt; Cancer in her father and mother.     *ALLERGIES*     Sulfa antibiotics     Physical Exam:      VS: /76   Temp 97.4 °F (36.3 °C) (Temporal)   Resp 18   Ht 5' 6\" (1.676 m)   Wt 169 lb (76.7 kg)   BMI 27.28 kg/m²      Initial Plan of Care:  Initiate Treatment-Testing, Proceed toTreatment Area When Bed Available for ED Attending/MLP to Continue Care    Patient ANO x3  Lungs clear to auscultation bilaterally  Cranial nerves II through XII grossly intact  Bilateral lower extremities swollen    Unable to get a pulse ox in triage. Son/pt state that they never are able to get this with the pulse ox machine. No beds available at this time. Charge nurse was notified. Patient will get the next available bed. I did check on the patient at 1700 and her son states that she is still doing well. Patient also notes that she is doing well. We will get patient back to soon as possible.   Ultrasound/CT/XR called to let them know not to take the patient for imaging until we can get her vitals completed to make sure she is stable.    -----------------END OF FIRST PROVIDER CONTACT ASSESSMENT NOTE--------------  Electronically signed by Osman Akins PA-C   DD: 10/19/21             Doe Orozco PA-C  10/19/21 2560

## 2021-10-19 NOTE — ED PROVIDER NOTES
HPI:  10/19/21, Time: 5:45 PM EDT         Cruz Bower is a 70 y.o. female presenting to the ED for SOB LE swelling , beginning several days ago. The complaint has been persistent, moderate in severity, and worsened by light exertion. Patient is known history of congestive heart failure presents from her PCPs office for shortness of breath and swelling of her lower extremities. She is found to be hypoxic in triage at 92%. She was placed on 2 L nasal cannula. Patient is noncompliant with her diuretics states she has not taken for 3 to 4 days. She is post to be on Bumex 1 mg 2 pills twice daily. She has no fevers or chills. She denies hemoptysis. No Covid exposures. She is not vaccinated for COVID-19. She has no abdominal pain nausea vomiting. She does report increased urinary frequency is a reason why she stopped taking her diuretics. ROS:   Pertinent positives and negatives are stated within HPI, all other systems reviewed and are negative.  --------------------------------------------- PAST HISTORY ---------------------------------------------  Past Medical History:  has a past medical history of AF (atrial fibrillation) ([facility] [address] Anxiety, Atrial tachycardia ([facility] [address] Breast cancer ([facility] [address] Depression, Hyperlipidemia, Lymphadenopathy, Osteoporosis, Radiation, and Status post chemotherapy. Past Surgical History:  has a past surgical history that includes Tunneled venous port placement; Tonsillectomy; bronchoscopy (07/12/2012); Mastectomy; Cardiac catheterization (Right, 04/03/2014); Cardioversion (11/16/2016); Mastectomy (Right, 02/06/2009); Cardioversion (01/14/2021); Upper gastrointestinal endoscopy (N/A, 2/9/2021); Upper gastrointestinal endoscopy (N/A, 3/1/2021); and Colonoscopy (N/A, 3/1/2021). Social History:  reports that she has never smoked. She has never used smokeless tobacco. She reports that she does not drink alcohol and does not use drugs.     Family History: family history includes Breast Cancer in her maternal aunt; Cancer in her father and mother. The patients home medications have been reviewed. Allergies: Sulfa antibiotics    ---------------------------------------------------PHYSICAL EXAM--------------------------------------    Constitutional/General: Alert and oriented x3, well appearing, non toxic in NAD  Head: Normocephalic and atraumatic  Eyes: PERRL, EOMI  Mouth: Oropharynx clear, handling secretions, no trismus  Neck: Supple, full ROM, non tender to palpation in the midline, no stridor, no crepitus, no meningeal signs  Pulmonary: Lungs clear to auscultation bilaterally, no wheezes, rales, or rhonchi. Not in respiratory distress  Cardiovascular:  Regular rate. Regular rhythm. No murmurs, gallops, or rubs. 2+ distal pulses  Chest: no chest wall tenderness  Abdomen: Soft. Non tender. Non distended. +BS. No rebound, guarding, or rigidity. No pulsatile masses appreciated. Musculoskeletal: Moves all extremities x 4. Warm and well perfused, no clubbing, cyanosis,  4 plus pitting edema edema. Capillary refill <3 seconds  Skin: warm and dry. No rashes. Neurologic: GCS 15, CN 2-12 grossly intact, no focal deficits, symmetric strength 5/5 in the upper and lower extremities bilaterally  Psych: Normal Affect    -------------------------------------------------- RESULTS -------------------------------------------------  I have personally reviewed all laboratory and imaging results for this patient. Results are listed below.      LABS:  Results for orders placed or performed during the hospital encounter of 10/19/21   COVID-19, Rapid    Specimen: Nasopharyngeal Swab   Result Value Ref Range    SARS-CoV-2, NAAT DETECTED (A) Not Detected   CBC Auto Differential   Result Value Ref Range    WBC 5.9 4.5 - 11.5 E9/L    RBC 4.79 3.50 - 5.50 E12/L    Hemoglobin 13.7 11.5 - 15.5 g/dL    Hematocrit 45.9 34.0 - 48.0 %    MCV 95.8 80.0 - 99.9 fL    MCH 28.6 26.0 - 35.0 pg    MCHC 29.8 (L) 32.0 - 34.5 % RDW 18.2 (H) 11.5 - 15.0 fL    Platelets 379 828 - 728 E9/L    MPV 10.6 7.0 - 12.0 fL    Neutrophils % 60.4 43.0 - 80.0 %    Immature Granulocytes % 0.3 0.0 - 5.0 %    Lymphocytes % 21.3 20.0 - 42.0 %    Monocytes % 17.4 (H) 2.0 - 12.0 %    Eosinophils % 0.3 0.0 - 6.0 %    Basophils % 0.3 0.0 - 2.0 %    Neutrophils Absolute 3.56 1.80 - 7.30 E9/L    Immature Granulocytes # 0.02 E9/L    Lymphocytes Absolute 1.26 (L) 1.50 - 4.00 E9/L    Monocytes Absolute 1.03 (H) 0.10 - 0.95 E9/L    Eosinophils Absolute 0.02 (L) 0.05 - 0.50 E9/L    Basophils Absolute 0.02 0.00 - 0.20 E9/L   Comprehensive Metabolic Panel w/ Reflex to MG   Result Value Ref Range    Sodium 139 132 - 146 mmol/L    Potassium reflex Magnesium 4.1 3.5 - 5.0 mmol/L    Chloride 94 (L) 98 - 107 mmol/L    CO2 36 (H) 22 - 29 mmol/L    Anion Gap 9 7 - 16 mmol/L    Glucose 93 74 - 99 mg/dL    BUN 19 6 - 23 mg/dL    CREATININE 0.9 0.5 - 1.0 mg/dL    GFR Non-African American >60 >=60 mL/min/1.73    GFR African American >60     Calcium 9.0 8.6 - 10.2 mg/dL    Total Protein 6.2 (L) 6.4 - 8.3 g/dL    Albumin 3.5 3.5 - 5.2 g/dL    Total Bilirubin 2.8 (H) 0.0 - 1.2 mg/dL    Alkaline Phosphatase 124 (H) 35 - 104 U/L    ALT 11 0 - 32 U/L    AST 22 0 - 31 U/L   LACTIC ACID, PLASMA   Result Value Ref Range    Lactic Acid 2.1 0.5 - 2.2 mmol/L   Troponin   Result Value Ref Range    Troponin, High Sensitivity 36 (H) 0 - 9 ng/L   Brain Natriuretic Peptide   Result Value Ref Range    Pro-BNP 5,044 (H) 0 - 125 pg/mL   EKG 12 Lead   Result Value Ref Range    Ventricular Rate 92 BPM    Atrial Rate 250 BPM    QRS Duration 80 ms    Q-T Interval 368 ms    QTc Calculation (Bazett) 455 ms    R Axis -175 degrees    T Axis -98 degrees       RADIOLOGY:  Interpreted by Radiologist.  CT HEAD WO CONTRAST   Final Result   No acute intracranial abnormality.          XR CHEST PORTABLE   Final Result   New right CP angle blunting suggestive of small pleural effusion      New subpleural opacity in would likely result in a life threatening deterioration or permanent disability. Accordingly this patient received the above mentioned time, excluding separately billable procedures. This patient's ED course included: a personal history and physicial examination, re-evaluation prior to disposition, multiple bedside re-evaluations, IV medications, cardiac monitoring, continuous pulse oximetry, complex medical decision making and emergency management, supplemental Oxygen and a personal history and physicial eaxmination    This patient has remained hemodynamically stable, improved and been closely monitored during their ED course. Counseling: The emergency provider has spoken with the patient and discussed todays results, in addition to providing specific details for the plan of care and counseling regarding the diagnosis and prognosis. Questions are answered at this time and they are agreeable with the plan.       --------------------------------- IMPRESSION AND DISPOSITION ---------------------------------    IMPRESSION  1. COVID    2. Dyspnea and respiratory abnormalities    3. Acute on chronic combined systolic and diastolic congestive heart failure ([facility] [address]        DISPOSITION  Disposition: Admit to telemetry  Patient condition is stable        NOTE: This report was transcribed using voice recognition software.  Every effort was made to ensure accuracy; however, inadvertent computerized transcription errors may be present        Bethany Jackman DO  10/19/21 1909

## 2021-10-20 LAB
ANTISTREPTOLYSIN-O: 61 IU/ML (ref 0–200)
BACTERIA: ABNORMAL /HPF
BILIRUBIN DIRECT: 1.4 MG/DL (ref 0–0.3)
BILIRUBIN URINE: NEGATIVE
BILIRUBIN, INDIRECT: 1.1 MG/DL (ref 0–1)
BLOOD, URINE: NEGATIVE
C-REACTIVE PROTEIN: 1.8 MG/DL (ref 0–0.4)
CHOLESTEROL, TOTAL: 99 MG/DL (ref 0–199)
CLARITY: CLEAR
COLOR: YELLOW
EKG ATRIAL RATE: 250 BPM
EKG Q-T INTERVAL: 368 MS
EKG QRS DURATION: 80 MS
EKG QTC CALCULATION (BAZETT): 455 MS
EKG R AXIS: -175 DEGREES
EKG T AXIS: -98 DEGREES
EKG VENTRICULAR RATE: 92 BPM
FERRITIN: 124 NG/ML
GLUCOSE URINE: NEGATIVE MG/DL
HDLC SERPL-MCNC: 28 MG/DL
INR BLD: 2.8
KETONES, URINE: NEGATIVE MG/DL
LDL CHOLESTEROL CALCULATED: 51 MG/DL (ref 0–99)
LEUKOCYTE ESTERASE, URINE: NEGATIVE
NITRITE, URINE: NEGATIVE
PH UA: 6 (ref 5–9)
PROCALCITONIN: 0.04 NG/ML (ref 0–0.08)
PROTEIN UA: NEGATIVE MG/DL
PROTHROMBIN TIME: 31.3 SEC (ref 9.3–12.4)
RBC UA: ABNORMAL /HPF (ref 0–2)
SPECIFIC GRAVITY UA: 1.02 (ref 1–1.03)
TRIGL SERPL-MCNC: 102 MG/DL (ref 0–149)
UROBILINOGEN, URINE: 2 E.U./DL
VLDLC SERPL CALC-MCNC: 20 MG/DL
WBC UA: ABNORMAL /HPF (ref 0–5)

## 2021-10-20 PROCEDURE — 87088 URINE BACTERIA CULTURE: CPT

## 2021-10-20 PROCEDURE — 81001 URINALYSIS AUTO W/SCOPE: CPT

## 2021-10-20 PROCEDURE — 6360000002 HC RX W HCPCS: Performed by: INTERNAL MEDICINE

## 2021-10-20 PROCEDURE — 2700000000 HC OXYGEN THERAPY PER DAY

## 2021-10-20 PROCEDURE — 99233 SBSQ HOSP IP/OBS HIGH 50: CPT | Performed by: INTERNAL MEDICINE

## 2021-10-20 PROCEDURE — 6370000000 HC RX 637 (ALT 250 FOR IP): Performed by: INTERNAL MEDICINE

## 2021-10-20 PROCEDURE — 36415 COLL VENOUS BLD VENIPUNCTURE: CPT

## 2021-10-20 PROCEDURE — 85610 PROTHROMBIN TIME: CPT

## 2021-10-20 PROCEDURE — 2580000003 HC RX 258: Performed by: INTERNAL MEDICINE

## 2021-10-20 PROCEDURE — 2500000003 HC RX 250 WO HCPCS: Performed by: INTERNAL MEDICINE

## 2021-10-20 PROCEDURE — 87040 BLOOD CULTURE FOR BACTERIA: CPT

## 2021-10-20 PROCEDURE — 2060000000 HC ICU INTERMEDIATE R&B

## 2021-10-20 RX ADMIN — ATORVASTATIN CALCIUM 10 MG: 10 TABLET, FILM COATED ORAL at 21:37

## 2021-10-20 RX ADMIN — DOXYCYCLINE 100 MG: 100 INJECTION, POWDER, LYOPHILIZED, FOR SOLUTION INTRAVENOUS at 11:41

## 2021-10-20 RX ADMIN — SUCRALFATE 1 G: 1 TABLET ORAL at 18:41

## 2021-10-20 RX ADMIN — Medication 10 ML: at 08:01

## 2021-10-20 RX ADMIN — METOPROLOL TARTRATE 25 MG: 25 TABLET, FILM COATED ORAL at 10:55

## 2021-10-20 RX ADMIN — DOFETILIDE 500 MCG: 0.5 CAPSULE ORAL at 10:54

## 2021-10-20 RX ADMIN — Medication 10 ML: at 21:38

## 2021-10-20 RX ADMIN — Medication 2000 UNITS: at 10:55

## 2021-10-20 RX ADMIN — PHYTONADIONE 10 MG: 10 INJECTION, EMULSION INTRAMUSCULAR; INTRAVENOUS; SUBCUTANEOUS at 06:42

## 2021-10-20 RX ADMIN — DOFETILIDE 500 MCG: 0.5 CAPSULE ORAL at 21:37

## 2021-10-20 RX ADMIN — PAROXETINE HYDROCHLORIDE 20 MG: 20 TABLET, FILM COATED ORAL at 10:55

## 2021-10-20 RX ADMIN — ZINC SULFATE 220 MG (50 MG) CAPSULE 50 MG: CAPSULE at 10:54

## 2021-10-20 RX ADMIN — PANTOPRAZOLE SODIUM 40 MG: 40 TABLET, DELAYED RELEASE ORAL at 06:05

## 2021-10-20 RX ADMIN — BUMETANIDE 1 MG: 0.25 INJECTION, SOLUTION INTRAMUSCULAR; INTRAVENOUS at 21:37

## 2021-10-20 RX ADMIN — DEXAMETHASONE SODIUM PHOSPHATE 6 MG: 10 INJECTION INTRAMUSCULAR; INTRAVENOUS at 18:40

## 2021-10-20 RX ADMIN — BUMETANIDE 1 MG: 0.25 INJECTION, SOLUTION INTRAMUSCULAR; INTRAVENOUS at 10:55

## 2021-10-20 RX ADMIN — SUCRALFATE 1 G: 1 TABLET ORAL at 11:41

## 2021-10-20 RX ADMIN — FERROUS SULFATE TAB 325 MG (65 MG ELEMENTAL FE) 325 MG: 325 (65 FE) TAB at 08:01

## 2021-10-20 RX ADMIN — SUCRALFATE 1 G: 1 TABLET ORAL at 08:01

## 2021-10-20 RX ADMIN — Medication 1000 MG: at 21:37

## 2021-10-20 RX ADMIN — ZINC SULFATE 220 MG (50 MG) CAPSULE 50 MG: CAPSULE at 21:37

## 2021-10-20 RX ADMIN — METOPROLOL TARTRATE 25 MG: 25 TABLET, FILM COATED ORAL at 21:37

## 2021-10-20 ASSESSMENT — PAIN SCALES - GENERAL
PAINLEVEL_OUTOF10: 0

## 2021-10-20 NOTE — H&P
[facility] History and Physical      CHIEF COMPLAINT:  Shortness of breath    History of Present Illness: 81-year-old female with a history of breast cancer, A. fib, chronic anticoagulation, anemia due to GI bleed, prior ischemic cardiomyopathy, moderate mitral regurg, pulmonary hypertension, diastolic heart failure. She was supposed to be on Bumex daily but states she was not taking it. She is not sure why, she answered that she was lazy and forgetful. For the past few weeks she has noticed increased lower extremity edema and dyspnea on exertion that progressed significantly in the past week. No chest pain. No change in her diet. Presented to the ED for further evaluation of this. Found to be hypoxic in the 80s therefore placed on 2 L of oxygen. Covid test was performed and was positive. The patient states she was vaccinated in January. Her daughter-in-law was recently sick with Covid. The patient denies any sore throat, anosmia or dysgeusia , no diarrhea, nausea or vomiting  She also mentioned visual hallucinations. Occurred intermittently past 2 weeks. Insight intact. As examples she reports seeing a cat but knows that there is no cat in her house. Also she sees some of the figurines on her shelves moving. Reports prior history of this that she states occurs when she is sick including when she has fluid overload. Noted to have erythema in her left leg. With an area of skin breakdown on the medial aspect. She did not notice it before.       REVIEW OF SYSTEMS:  A comprehensive 14 point review of systems was negative except for: what is in the HPI    PMH:  Past Medical History:   Diagnosis Date    AF (atrial fibrillation) (HCC)     Anxiety     Atrial tachycardia ([facility] [address] 01/14/2021    Breast cancer (HCC)     right chest wall involvement    Depression     Hyperlipidemia     Lymphadenopathy     Osteoporosis     Radiation     Status post chemotherapy     neoadjuvant chemo with AC, Taxol, carboplatin, and Herceptin       Surgical History:  Past Surgical History:   Procedure Laterality Date    BRONCHOSCOPY  07/12/2012    ebus    CARDIAC CATHETERIZATION Right 04/03/2014    Dr. Wallace Caballero  11/16/2016    CARDIOVERSION  01/14/2021    Successful    (Dr. J Luis Reyna)    COLONOSCOPY N/A 3/1/2021    COLONOSCOPY DIAGNOSTIC performed by Maurizio Hood MD at [address]      RT - 2010    MASTECTOMY Right 02/06/2009    TONSILLECTOMY      TUNNELED VENOUS PORT PLACEMENT      UPPER GASTROINTESTINAL ENDOSCOPY N/A 2/9/2021    EGD ESOPHAGOGASTRODUODENOSCOPY performed by Maurizio Hood MD at [place] [address] N/A 3/1/2021    EGD ESOPHAGOGASTRODUODENOSCOPY performed by Maurizio Hood MD at [facility] ENDOSCOPY       Medications Prior to Admission:    Prior to Admission medications    Medication Sig Start Date End Date Taking?  Authorizing Provider   dofetilide (TIKOSYN) 250 MCG capsule Take 500 mcg by mouth 2 times daily    Historical Provider, MD   ferrous sulfate (IRON 325) 325 (65 Fe) MG tablet Take 325 mg by mouth daily (with breakfast)    Historical Provider, MD   metoprolol tartrate (LOPRESSOR) 25 MG tablet Take 1 tablet by mouth 2 times daily 6/3/21   Katie Ovalle,    pantoprazole (PROTONIX) 40 MG tablet Take 1 tablet by mouth every morning (before breakfast) 3/4/21   Jasonville Neligh, DO   sucralfate (CARAFATE) 1 GM tablet Take 1 tablet by mouth 3 times daily (with meals) 3/3/21   [facility], DO   bumetanide (BUMEX) 1 MG tablet Take 1 mg by mouth as needed Indications: May Take Addional Dose if Needed    Historical Provider, MD   OXYGEN Inhale 2 L into the lungs nightly    Historical Provider, MD   potassium chloride (KLOR-CON M) 20 MEQ extended release tablet Take 1 tablet by mouth daily 2/11/21   Tobi Jain MD   PARoxetine (PAXIL) 20 MG tablet Take 1 tablet by mouth every morning 10/4/17   Kar Tyler, MD   atorvastatin (LIPITOR) 10 MG tablet Take 10 mg by mouth nightly     Historical Provider, MD   ALPRAZolam (XANAX) 0.25 MG tablet Take 0.25 mg by mouth daily as needed for Anxiety. 3/24/16   Historical Provider, MD   calcium carbonate (OSCAL) 500 MG TABS tablet Take 500 mg by mouth every morning     Historical Provider, MD       Allergies:    Sulfa antibiotics    Social History:    reports that she has never smoked. She has never used smokeless tobacco. She reports that she does not drink alcohol and does not use drugs. Family History:   family history includes Breast Cancer in her maternal aunt; Cancer in her father and mother. PHYSICAL EXAM:  Vitals:  /82   Pulse 92   Temp 98.2 °F (36.8 °C) (Oral)   Resp 16   Ht 5' 6\" (1.676 m)   Wt 169 lb (76.7 kg)   SpO2 96%   BMI 27.28 kg/m²   General Appearance: alert and oriented to person, place and time and in no acute distress  Skin: warm and dry, turgor not diminished  Head: normocephalic and atraumatic  Eyes: pupils equal, round, and reactive to light, extraocular eye movements intact, conjunctivae normal  Neck: neck supple and non tender without mass   Pulmonary/Chest: clear to auscultation bilaterally- no wheezes, rales or rhonchi, normal air movement, no respiratory distress  Cardiovascular: normal rate, normal S1 and S2 and 2 out of 6 systolic ejection murmur. JVD is elevated. Abdomen: soft, non-tender, non-distended, normal bowel sounds, no masses or organomegaly  Extremities: no cyanosis, no clubbing and +2 edema extending to her thighs. Over her left medial shin she has an area of skin breakdown in the medial aspect. Surrounded by erythema.   Neurologic: no cranial nerve deficit and speech normal        LABS:  Recent Labs     10/19/21  1724      K 4.1   CL 94*   CO2 36*   BUN 19   CREATININE 0.9   GLUCOSE 93   CALCIUM 9.0       Recent Labs     10/19/21  1724   WBC 5.9   RBC 4.79   HGB 13.7   HCT 45.9   MCV 95.8   MCH 28.6   MCHC 29.8*   RDW 18.2*      MPV 10.6       No results for input(s): POCGLU in the last 72 hours. Radiology:   CT HEAD WO CONTRAST   Final Result   No acute intracranial abnormality. XR CHEST PORTABLE   Final Result   New right CP angle blunting suggestive of small pleural effusion      New subpleural opacity in right lower lung lateral aspect may be atelectasis   or tracking pleural fluid. Consider also band like small consolidated   infiltrate from pneumonitis         US DUP LOWER EXTREMITIES BILATERAL VENOUS   Final Result   No evidence of DVT in either lower extremity. EKG: Atrial fibrillation    ASSESSMENT/PLAN:  Acute respiratory failure with hypoxia  Acute on chronic diastolic heart failure  [id number] pneumonia  Nonzero troponin  -Unclear how much of her hypoxia is due to fluid overload versus Covid. -Bumex IV twice daily. Salt restriction. Wean down oxygen as tolerated  -Start Decadron. Monitor oxygen requirement. Check inflammatory markers. If they are elevated or oxygen requirement worsens may need further medications. -D-dimer negative  -Trend troponin    Lower extremity cellulitis  -Rocephin and doxycycline  -Check blood cultures and ASO  -Elevate lower extremities    Permanent A. fib  Anemia due to GIB  -Resume home regimen including Tikosyn. QTC okay. -Check INR as it was significantly elevated recently.  -not on AC due to bleeding. Is being evaluated for watchman    Hyperbilirubinemia  -Repeat tomorrow obtain direct      Visual Hallucinations  -None currently. Intact insight. Monitor for now. CT head negative. Code Status: DNR arrest  DVT prophylaxis: On Eliquis      NOTE: This report was transcribed using voice recognition software. Every effort was made to ensure accuracy; however, inadvertent computerized transcription errors may be present.   Electronically signed by Sina Schneider MD on 10/19/2021 at 8:57 PM

## 2021-10-20 NOTE — DISCHARGE INSTR - DIET
 Good nutrition is important when healing from an illness, injury, or surgery. Follow any nutrition recommendations given to you during your hospital stay.  If you were given an oral nutrition supplement while in the hospital, continue to take this supplement at home. You can take it with meals, in-between meals, and/or before bedtime. These supplements can be purchased at most local grocery stores, pharmacies, and chain super-stores.  If you have any questions about your diet or nutrition, call the hospital and ask for the dietitian. Heart Failure Nutrition Therapy     This nutrition therapy will help you feel better and support your heart. This plan focuses on:    Limiting sodium in your diet. Salt (sodium) makes your body hold water. When your body holds too much water, you can feel shortness of breath and swelling. You can prevent these symptoms by eating less salt.  Limiting fluid in your diet. For some patients, drinking too much fluid can make heart failure worse. It can cause symptoms such as shortness of breath and swelling. Limiting fluids can help relieve some of your symptoms.  Managing your weight. Your registered dietitian nutritionist (RDN) can help you choose a healthy weight for your body type. You can achieve these goals by:    Reading food labels to keep track of how much sodium is in the foods you eat.  Limiting foods that are high in sodium.  Checking your weight to make sure youre not retaining too much fluid. Reading the Food Label: How Much Sodium Is Too Much? The nutrition plan for heart failure usually limits the sodium you get from food and drinks to 2,000 milligrams per day. Salt is the main source of sodium. Read the nutrition label to find out how much sodium is in 1 serving of a food.  Select foods with 140 milligrams of sodium or less per serving.     Foods with more than 300 milligrams of sodium per serving may not fit into a reduced-sodium meal plan.    Check serving sizes. If you eat more than 1 serving, you will get more sodium than the amount listed. Cutting Back on Sodium    Avoid processed foods. Eat more fresh foods.  Fresh and frozen fruits and vegetables without added juices or sauces are naturally low in sodium.  Fresh meats are lower in sodium than processed meats, such as mendieta, sausage, and hot dogs. Read the nutrition label or ask your  to help you find a fresh meat that is low in sodium.  Eat less salt, at the table and when cooking.  Just 1 teaspoon of table salt has 2,300 milligrams of sodium.  Leave the salt out of recipes for pasta, casseroles, and soups.  Ask your RDN how to cook your favorite recipes without sodium.  Be a smart .  Look for food packages that say salt-free or sodium-free.  These items contain less than 5 milligrams of sodium per serving.  Very-low-sodium products contain less than 35 milligrams of sodium per serving.  Low-sodium products contain less than 140 milligrams of sodium per serving.  Unsalted or no added salt products may still be high in sodium. Check the nutrition label.  Add flavors to your food without adding sodium.  Try lemon juice, lime juice, fruit juice, or vinegar.  Dry or fresh herbs add flavor. Try basil, bay leaf, dill, rosemary, parsley, joyce, dry mustard, nutmeg, thyme, and paprika.  Pepper, red pepper flakes, and cayenne pepper can add spice to your meals without adding sodium. Hot sauce contains sodium, but if you use just a drop or two, it will not add up to much.  Buy a sodium-free seasoning blend or make your own at home.  Use caution when you eat outside your home.  Restaurant foods can be very high in sodium.  Ask for nutrition information. Many restaurants provide nutrition facts on their menus or websites.  Let your  know that you want your food to be cooked without salt.  Ask for your salad dressing and sauces to come on the side.   Fluid Restriction   Your doctor may ask you to follow a fluid restriction in addition to taking diuretics (water pills). Ask your doctor how much fluid you can have. Foods that are liquid at room temperature are considered a fluid, such as popsicles, soup, ice cream, and Jell-O. Here are some common conversions that will help you measure your fluid intake every day:    1,000 milliliters = 1 liter or 4 cups  1 fluid ounce = 30 milliliters    1 cup = 240 milliliters  2,000 milliliters = 2 liters or 8 cups    1,500 milliliters = 1½ liters or 6 cups    Weight Monitoring   Weigh yourself each day. Sudden weight gain is a sign that fluid is building up in your body. Follow these guidelines:    Weigh yourself every morning. If you gain 3 or more pounds in 1-2 days or 5 or more pounds within 1 week, call your doctor. Your doctor may adjust your medicine to get rid of the extra fluid.  Talk with your doctor or RDN about what a healthy weight is for you.  Talk with your doctor to find out what type of physical activity is best for you.   Foods Recommended   Food Group Recommended Foods   Grains Bread with less than 80 milligrams sodium per slice (yeast breads usually have less sodium than those made with baking soda)  Homemade bread made with reduced-sodium baking soda  Many cold cereals, especially shredded wheat and puffed rice  Oats, grits, or cream of wheat  Dry pastas, noodles, quinoa, and rice   Vegetables Fresh and frozen vegetables without added sauces, salt, or sodium  Homemade soups (salt free or low sodium)  Low-sodium or sodium-free canned vegetables and soups   Fruits Fresh and canned fruits  Dried fruits, such as raisins, cranberries, and prunes   Dairy (Milk and Milk Products) Milk or milk powder  Rice milk and soy milk  Yogurt, including Greek yogurt  Small amounts of natural, block cheese or reduced-sodium cheese (Swiss, ricotta, and fresh mozzarella are lower in sodium than others)  Regular or soft cream cheese and low-sodium cottage cheese   Protein Foods (Meat, Poultry, Fish, Beans) Fresh meats and fish  Citizen of [place]) mendieta (except if packaged in a sodium solution)  Canned or packed tuna (no more than 4 ounces at 1 serving)  Dried beans and peas; edamame (fresh soybeans)  Eggs or egg beaters (if less than 200 mg per serving)  Unsalted nuts or peanut butter   Desserts and Snacks Fresh fruit or applesauce  Renato food cake  Granola bars  Unsalted pretzels, popcorn, or nuts  Pudding or gelatin with whipped cream topping  Homemade rice-crispy treats  Vanilla wafers  Frozen fruit bars   Fats Tub or liquid margarine  Unsaturated fat oils (canola, olive, corn, sunflower, safflower, peanut)   Condiments Fresh or dried herbs; low-sodium ketchup; vinegar; lemon or lime juice; pepper; salt-free seasoning mixes and marinades (salt-free seasoning blend); simple salad dressings (vinegar and oil); salt-free sauces   Foods Not Recommended   Food Group Foods Not Recommended   Grains Breads or crackers topped with salt  Cereals (hot/cold) with more than 300 milligrams sodium per serving  Biscuits, cornbread, and other quick breads prepared with baking soda  Prepackaged bread crumbs  Self-rising flours   Vegetables Canned vegetables (unless they are salt free or low sodium)  Frozen vegetables with seasoning and sauces  Sauerkraut and pickled vegetables  Canned or dried soups (unless they are salt free or low sodium)  [place] fries and onion rings   Fruits Dried fruits preserved with sodium-containing additives   Dairy (Milk and Milk Products) Buttermilk  Processed cheeses   Cottage cheese (unless a low-sodium variety)  Feta cheese; shredded cheese (has more sodium than block cheese); singles slices and string cheese   Protein Foods (Meat, Poultry, Fish, Beans) Cured meats: mendieta, ham, sausage, pepperoni, and hot dogs  Canned meats: chili, Tanya sausage, sardines, and ham  Smoked fish and meats  Frozen meals that have more than 600 milligrams sodium   Fats Salted butter or margarine   Condiments Salt, sea salt, kosher salt, onion salt, and garlic salt  Seasoning mixes containing salt (Lemon Pepper or Bouillon cubes)  Catsup or ketchup, BBQ sauce, Worcestershire and soy sauce  Salsa, pickles, olives, relish  Salad dressings: ranch, blue cheese, [place], and [place]    Alcohol Check with your doctor.    Heart Failure Sample 1-Day Menu View Nutrient Info         Breakfast 1 cup regular oatmeal made with water or milk   1 cup reduced-fat (2%) milk   1 medium banana   1 slice whole wheat bread   1 tablespoon salt-free peanut butter   Morning Snack 1/2 cup dried cranberries   Lunch 3 ounces grilled chicken breast   1 cup salad greens   Olive oil and vinegar dressing (for greens)   5 unsalted or low-sodium crackers   Fruit plate with 1/4 cup strawberries   1/2 sliced orange (for fruit plate)   1 peach half (for fruit plate)   Afternoon Snack 1 ounce low-sodium turkey   1 piece whole wheat bread   Evening Meal 3 ounces herb-baked fish   1 baked potato   2 teaspoons soft margarine (trans fat-free) (for potato)   Sliced tomatoes   1/2 cup steamed spinach drizzled with lemon juice   3-inch square of vu food cake   Fresh strawberries (2) (for cake)   Evening Snack 2 tablespoons salt-free peanut butter   5 low-sodium crackers   Daily Sum   Nutrient Unit Value   Macronutrients   Energy kcal 1890   Energy kJ 7906   Protein g 95   Total lipid (fat) g 56   Carbohydrate, by difference g 270   Fiber, total dietary g 31   Sugars, total g 99   Minerals   Calcium, Ca mg 949   Iron, Fe mg 27   Sodium, Na mg 1538   Vitamins   Vitamin C, total ascorbic acid mg 118   Vitamin A, IU IU 70722   Vitamin D    Lipids   Fatty acids, total saturated g 13   Fatty acids, total monounsaturated g 22   Fatty acids, total polyunsaturated g 16   Cholesterol mg 126   Heart Failure Vegan Sample [facility] Nutrient Info Breakfast 1 cup oatmeal   ¼ cup walnuts   1 banana   1 cup soymilk fortified with calcium, vitamin B12, and vitamin D   Lunch 1 large whole wheat chacha   Salad made with: 1 cup chickpeas   1 cup lettuce   ½ cup cherry tomatoes   1 cup strawberries   1 tablespoon olive oil   1 tablespoon balsamic vinegar    Evening Meal ¾ cup tofu   2 teaspoons olive oil   Pinch garlic powder   1 baked potato   1 tablespoon margarine, soft, tub   ½ cup cooked spinach with:   Squeeze of lemon   1 cup soymilk fortified with calcium, vitamin B12, and vitamin D   Evening Snack 1 tablespoon peanut butter, without salt   ¾ ounce pretzels, without salt   Daily Sum   Nutrient Unit Value   Macronutrients   Energy kcal 1848   Energy kJ 7735   Protein g 74   Total lipid (fat) g 83   Carbohydrate, by difference g 223   Fiber, total dietary g 39   Sugars, total g 44   Minerals   Calcium, Ca mg 1325   Iron, Fe mg 20   Sodium, Na mg 1098   Vitamins   Vitamin C, total ascorbic acid mg 140   Vitamin A, IU IU 86367   Vitamin D    Lipids   Fatty acids, total saturated g 13   Fatty acids, total monounsaturated g 33   Fatty acids, total polyunsaturated g 32   Cholesterol mg 0   Heart Failure Vegetarian (Lacto-Ovo) Sample 1-Day Menu View Nutrient Info         Breakfast 1 cup oatmeal   ¼ cup walnuts   1 banana   1 cup fat-free milk   Lunch 1 large whole wheat chacha   Salad made with: ½ cup chickpeas   1 ounce mozzarella cheese   1 cup lettuce   ½ cup cherry tomatoes   1 cup strawberries   1 tablespoon olive oil   1 tablespoon balsamic vinegar    Evening Meal ¾ cup tofu   2 teaspoons olive oil   Pinch garlic powder   1 baked potato   1 tablespoon margarine, soft, tub   ½ cup cooked spinach with:   Squeeze of lemon   1 cup fat-free milk   Evening Snack 1 tablespoon peanut butter, without salt   1 apple   Daily Sum   Nutrient Unit Value   Macronutrients   Energy kcal 1847   Energy kJ 7734   Protein g 76   Total lipid (fat) g 79   Carbohydrate, by difference g 231   Fiber, total dietary g 35   Sugars, total g 83   Minerals   Calcium, Ca mg 1488   Iron, Fe mg 16   Sodium, Na mg 1100   Vitamins   Vitamin C, total ascorbic acid mg 149   Vitamin A, IU IU 62416   Vitamin D    Lipids   Fatty acids, total saturated g 15   Fatty acids, total monounsaturated g 32   Fatty acids, total polyunsaturated g 26   Cholesterol mg 28     If any questions/concerns please don't hesitate to reach out to either myself or primarily our out-patient dietitian THUY Koch RD) @ [phone] (Desk).     THUY Bravo RD  In-Patient Clinical Dietitian  [phone] (Office)  [facility]

## 2021-10-20 NOTE — PROGRESS NOTES
[facility]) Hospitalist Progress Note    Admission Date  10/19/2021  5:09 PM  Chief Complaint SOB improved    Subjective  History of Present Illness  Seen at bedside this AM  On [facility] - wears this nightly at home   No SOB at rest, no fever, no CP, no nausea / emesis / diarrhea  Says a bit fatigued and unsteady on her feet  Amenable to PT OT evals  Her phone doesn't work and that frustrates her  No family present during my visit  No new issues identified today  Case was discussed with charge nurse and pt's nurse    Review of Systems - 12-point review of systems has been reviewed and is otherwise negative except as listed in the HPI    Objective  Physical Exam  Vitals: /77   Pulse 113   Temp 97.3 °F (36.3 °C) (Oral)   Resp 20   Ht 5' 6\" (1.676 m)   Wt 169 lb (76.7 kg)   SpO2 97%   BMI 27.28 kg/m²   General: well-developed, well-nourished, no acute distress, cooperative  Skin: warm, dry, intact, skin generally looks flushed today  HEENT: normocephalic, atraumatic, mucous membranes normal; 2LNC   Respiratory: coarse to auscultation bilaterally without respiratory distress  Cardiovascular: regular rate and rhythm without murmur / rub / gallop  Abdominal: soft, nontender, nondistended, normoactive bowel sounds  Extremities: no mottling, pulses intact, trace edema if any  Neurologic: awake, alert, no focal deficits  Psychiatric: normal affect, cooperative    Assessment / Plan  Acute hypoxic resp failure d/t COVID pneumonia   COVID+ as of 10/19   Imaging noted   Afebrile   O2 requirement currently [facility]  o Chronically wears 2 at home   Incentive spirometry and prone positioning as tolerated   Inflammatory markers noted   Decadron 6 mg daily   Baricitinib deferred - pt chronically hypoxic and minimally symptomatic   Remdesivir / toci not indicated   ID and pulmonology consults deferred at this time   Symptom mgmt for cough, fevers, nausea, diarrhea, body aches if present    NICM - BNP ~5k on admission, on Bumex 1 mg IV bid for now - watch fluid balance closely, -680cc so far per EMR    Supratherapeutic INR - on Coumadin for Afib, INR was 8.1, not activley bleeding but issues w GI bleedsd multiple times this year; vitK given and will follow INR    Afib - on Coumadin as above. Follows w Dr. Anuj Rivera.  Rate controlled, cardio note Aug 11 reviewed, pt is no longer on Tikosyn and I dc'd this today    ? LE cellulitis - Started on Rocephin and doxy, ASO was only 64, will stop abx for now though continue to monitor     Hallucinations, resolved - pt is appropriate today. UA unremarkable    Weakness / falls at home - PT OT consulted    Pt's PMH otherwise pertinent for HPL, breast Ca, anxiety. Continue outpt med regimen; if any particular issues, will address and move to active problem list above. Please see orders for further plan of care.      Code status  DNRCCA   DVT prophylaxis Held d/t supratherapeutic INR; on Coumadin chronically   Disposition  Likely home when ready - possibly as early as tomorrow    Electronically signed by Shelba Heimlich, DO on 10/20/2021 at 1:04 PM

## 2021-10-20 NOTE — ACP (ADVANCE CARE PLANNING)
10/20/2021 Advance Care Planning     Advance Care Planning Activator (Inpatient)  Conversation Note      Date of ACP Conversation: 10/20/2021     Conversation Conducted with: Saadia James    ACP Activator: JIMENEZ Phoenix        Health Care Decision Maker:     Current Designated Health Care Decision Maker: Saadia James    Click here to complete Healthcare Decision Makers including section of the Healthcare Decision Maker Relationship (ie \"Primary\")      Care Preferences    Ventilation: \"If you were in your present state of health and suddenly became very ill and were unable to breathe on your own, what would your preference be about the use of a ventilator (breathing machine) if it were available to you? \"      Would the patient desire the use of ventilator (breathing machine)?: yes    \"If your health worsens and it becomes clear that your chance of recovery is unlikely, what would your preference be about the use of a ventilator (breathing machine) if it were available to you? \"     Would the patient desire the use of ventilator (breathing machine)?: yes       Resuscitation  \"CPR works best to restart the heart when there is a sudden event, like a heart attack, in someone who is otherwise healthy. Unfortunately, CPR does not typically restart the heart for people who have serious health conditions or who are very sick. \"    \"In the event your heart stopped as a result of an underlying serious health condition, would you want attempts to be made to restart your heart (answer \"yes\" for attempt to resuscitate) or would you prefer a natural death (answer \"no\" for do not attempt to resuscitate)? \" yes       [] Yes   [x] No   Educated Patient / Annemarie Bonner regarding differences between Advance Directives and portable DNR orders.     Length of ACP Conversation in minutes:  10min  Conversation Outcomes:  [x] ACP discussion completed  [] Existing advance directive reviewed with patient; no changes to patient's previously recorded wishes  [] New Advance Directive completed  [] Portable Do Not Rescitate prepared for Provider review and signature  [] POLST/POST/MOLST/MOST prepared for Provider review and signature      Follow-up plan:    [] Schedule follow-up conversation to continue planning  [x] Referred individual to Provider for additional questions/concerns   [] Advised patient/agent/surrogate to review completed ACP document and update if needed with changes in condition, patient preferences or care setting    [] This note routed to one or more involved healthcare providers

## 2021-10-20 NOTE — PROGRESS NOTES
Nutrition Assessment     Type and Reason for Visit: Consult, Patient Education    Nutrition Assessment:  Consult received for CHF Diet Ed. Chart reviewed. Provided written edu on the Cardiac Diet which includes main diet guidelines, sample diet plan and ways to reduce sodium intake. Handout and contact info placed in d/c instructions. Will follow-up per policy.     Electronically signed by Andre Santoro RD, LD on 10/20/21 at 9:39 AM EDT    Contact: ext [phone]

## 2021-10-20 NOTE — CARE COORDINATION
10/20/2021  Social Work Discharge Planning:SW discussed discharge planning with Pt. POSITIVE COVID 10/19. History of AFIB. Pt resides with her son and his spouse . Pt has used [facility] recently. Pt is on IV ATB. Pt is also on 2l o2 here and uses 2l HS only, with [facility]. Pt uses a ww at home. SW did ACP with Pts son due to being unable to speak to Pt at this time. Pt is a DNR-CCA;however, son informed Pt would want to be on a vent and CPR performed if ever needed. Physician will need to speak to Pt about code status. Pharmacy is [facility] and PCP is Dr. Leonel Thomason. Electronically signed by JIMENEZ Balderas on 10/20/2021 at 1:51 PM    10/20/2021 Social Work Discharge Planning:Sw made a referral to [facility].  Electronically signed by JIMENEZ Balderas on 10/20/2021 at 2:15 PM

## 2021-10-21 LAB
ALBUMIN SERPL-MCNC: 3.1 G/DL (ref 3.5–5.2)
ALP BLD-CCNC: 111 U/L (ref 35–104)
ALT SERPL-CCNC: 11 U/L (ref 0–32)
ANION GAP SERPL CALCULATED.3IONS-SCNC: 9 MMOL/L (ref 7–16)
AST SERPL-CCNC: 22 U/L (ref 0–31)
BILIRUB SERPL-MCNC: 1.6 MG/DL (ref 0–1.2)
BUN BLDV-MCNC: 22 MG/DL (ref 6–23)
CALCIUM SERPL-MCNC: 8.5 MG/DL (ref 8.6–10.2)
CHLORIDE BLD-SCNC: 93 MMOL/L (ref 98–107)
CO2: 38 MMOL/L (ref 22–29)
CREAT SERPL-MCNC: 0.9 MG/DL (ref 0.5–1)
GFR AFRICAN AMERICAN: >60
GFR NON-AFRICAN AMERICAN: >60 ML/MIN/1.73
GLUCOSE BLD-MCNC: 130 MG/DL (ref 74–99)
MAGNESIUM: 1.6 MG/DL (ref 1.6–2.6)
POTASSIUM SERPL-SCNC: 3.2 MMOL/L (ref 3.5–5)
SODIUM BLD-SCNC: 140 MMOL/L (ref 132–146)
TOTAL PROTEIN: 5.8 G/DL (ref 6.4–8.3)

## 2021-10-21 PROCEDURE — 2580000003 HC RX 258: Performed by: INTERNAL MEDICINE

## 2021-10-21 PROCEDURE — 2500000003 HC RX 250 WO HCPCS: Performed by: INTERNAL MEDICINE

## 2021-10-21 PROCEDURE — 6360000002 HC RX W HCPCS: Performed by: INTERNAL MEDICINE

## 2021-10-21 PROCEDURE — 6370000000 HC RX 637 (ALT 250 FOR IP): Performed by: INTERNAL MEDICINE

## 2021-10-21 PROCEDURE — 97165 OT EVAL LOW COMPLEX 30 MIN: CPT

## 2021-10-21 PROCEDURE — 80053 COMPREHEN METABOLIC PANEL: CPT

## 2021-10-21 PROCEDURE — 36415 COLL VENOUS BLD VENIPUNCTURE: CPT

## 2021-10-21 PROCEDURE — 99232 SBSQ HOSP IP/OBS MODERATE 35: CPT | Performed by: INTERNAL MEDICINE

## 2021-10-21 PROCEDURE — 97161 PT EVAL LOW COMPLEX 20 MIN: CPT

## 2021-10-21 PROCEDURE — 83735 ASSAY OF MAGNESIUM: CPT

## 2021-10-21 PROCEDURE — 2060000000 HC ICU INTERMEDIATE R&B

## 2021-10-21 RX ORDER — POTASSIUM CHLORIDE 20 MEQ/1
40 TABLET, EXTENDED RELEASE ORAL EVERY 6 HOURS
Status: COMPLETED | OUTPATIENT
Start: 2021-10-21 | End: 2021-10-22

## 2021-10-21 RX ORDER — MAGNESIUM SULFATE IN WATER 40 MG/ML
2000 INJECTION, SOLUTION INTRAVENOUS ONCE
Status: COMPLETED | OUTPATIENT
Start: 2021-10-21 | End: 2021-10-21

## 2021-10-21 RX ADMIN — METOPROLOL TARTRATE 25 MG: 25 TABLET, FILM COATED ORAL at 20:46

## 2021-10-21 RX ADMIN — BUMETANIDE 1 MG: 0.25 INJECTION, SOLUTION INTRAMUSCULAR; INTRAVENOUS at 20:39

## 2021-10-21 RX ADMIN — BUMETANIDE 1 MG: 0.25 INJECTION, SOLUTION INTRAMUSCULAR; INTRAVENOUS at 09:48

## 2021-10-21 RX ADMIN — DOFETILIDE 500 MCG: 0.5 CAPSULE ORAL at 09:50

## 2021-10-21 RX ADMIN — SUCRALFATE 1 G: 1 TABLET ORAL at 16:24

## 2021-10-21 RX ADMIN — Medication 10 ML: at 09:47

## 2021-10-21 RX ADMIN — SUCRALFATE 1 G: 1 TABLET ORAL at 11:38

## 2021-10-21 RX ADMIN — ATORVASTATIN CALCIUM 10 MG: 10 TABLET, FILM COATED ORAL at 20:39

## 2021-10-21 RX ADMIN — ZINC SULFATE 220 MG (50 MG) CAPSULE 50 MG: CAPSULE at 20:39

## 2021-10-21 RX ADMIN — FERROUS SULFATE TAB 325 MG (65 MG ELEMENTAL FE) 325 MG: 325 (65 FE) TAB at 09:50

## 2021-10-21 RX ADMIN — PANTOPRAZOLE SODIUM 40 MG: 40 TABLET, DELAYED RELEASE ORAL at 06:15

## 2021-10-21 RX ADMIN — MAGNESIUM SULFATE HEPTAHYDRATE 2000 MG: 40 INJECTION, SOLUTION INTRAVENOUS at 18:36

## 2021-10-21 RX ADMIN — SUCRALFATE 1 G: 1 TABLET ORAL at 09:50

## 2021-10-21 RX ADMIN — METOPROLOL TARTRATE 25 MG: 25 TABLET, FILM COATED ORAL at 09:49

## 2021-10-21 RX ADMIN — POTASSIUM CHLORIDE 40 MEQ: 1500 TABLET, EXTENDED RELEASE ORAL at 18:36

## 2021-10-21 RX ADMIN — Medication 2000 UNITS: at 09:48

## 2021-10-21 RX ADMIN — Medication 1000 MG: at 20:39

## 2021-10-21 RX ADMIN — Medication 10 ML: at 20:39

## 2021-10-21 RX ADMIN — DEXAMETHASONE SODIUM PHOSPHATE 6 MG: 10 INJECTION INTRAMUSCULAR; INTRAVENOUS at 18:36

## 2021-10-21 RX ADMIN — Medication 1000 MG: at 09:50

## 2021-10-21 RX ADMIN — DOFETILIDE 500 MCG: 0.5 CAPSULE ORAL at 20:39

## 2021-10-21 RX ADMIN — PAROXETINE HYDROCHLORIDE 20 MG: 20 TABLET, FILM COATED ORAL at 09:48

## 2021-10-21 RX ADMIN — ZINC SULFATE 220 MG (50 MG) CAPSULE 50 MG: CAPSULE at 09:50

## 2021-10-21 ASSESSMENT — PAIN SCALES - GENERAL
PAINLEVEL_OUTOF10: 0
PAINLEVEL_OUTOF10: 0

## 2021-10-21 NOTE — PROGRESS NOTES
Physical Therapy    Facility/Department: [address] INTERNAL MEDICINE 2  Initial Assessment    NAME: Marco A Zaidi  : 1945  MRN: [id number]    Date of Service: 10/21/2021      Patient Diagnosis(es): The primary encounter diagnosis was COVID. Diagnoses of Dyspnea and respiratory abnormalities and Acute on chronic combined systolic and diastolic congestive heart failure ([address] were also pertinent to this visit. has a past medical history of AF (atrial fibrillation) ([address] Anxiety, Atrial tachycardia ([address] Breast cancer ([address] Depression, Hyperlipidemia, Lymphadenopathy, Osteoporosis, Radiation, and Status post chemotherapy. has a past surgical history that includes Tunneled venous port placement; Tonsillectomy; bronchoscopy (2012); Mastectomy; Cardiac catheterization (Right, 2014); Cardioversion (2016); Mastectomy (Right, 2009); Cardioversion (2021); Upper gastrointestinal endoscopy (N/A, 2021); Upper gastrointestinal endoscopy (N/A, 3/1/2021); and Colonoscopy (N/A, 3/1/2021). Evaluating Therapist: Bartolome Keller PT        Room #:  4727/8103-F  Diagnosis:  Dyspnea and respiratory abnormalities [R06.00, R06.89]  Acute on chronic combined systolic and diastolic congestive heart failure (HCC) [I50.43]  COVID [U07.1]  Pneumonia due to COVID-19 virus [U07.1, J12.82]  PMHx/PSHx:  Breast CA  Precautions:  Falls, O2, droplet plus isolation      Social:  Pt lives with son in a 1 floor plan. Prior to admission independent with ww, uses O2 at night     Initial Evaluation  Date: 10/21/21 Treatment      Short Term/ Long Term   Goals   Was pt agreeable to Eval/treatment?  yes     Does pt have pain? no     Bed Mobility  Rolling:  Min assist  Supine to sit: min assist  Sit to supine: NT  Scooting: min assist  independetn   Transfers Sit to stand: min assist  Stand to sit: min assist  Stand pivot: min assist  SBA   Ambulation    25 feet x2 with ww with CGA  75 feet with ww with SBA Stair Negotiation  Ascended and descended  NT      LE strength     3+/5    4-/5   balance      Fair with ww     AM-PAC Raw score               17/24         Pt is alert and Oriented   LE ROM: WFL  Sensation: intact  Edema: none  Endurance: fair     ASSESSMENT:    Pt displays functional ability as noted in the objective portion of this evaluation. Patient education  Pt educated on PT objectives    Patient response to education:   Pt verbalized understanding Pt demonstrated skill Pt requires further education in this area   yes           Comments:  Pt instructed on hand placement and safety with ww. Cues for walker safety especially when approaching chair to sit. Pt starts to sit before completely to chair and with walker between her and chair. Pt's/ family goals   1. To return home    Conditions Requiring Skilled Therapeutic Intervention:    [x]Decreased strength     []Decreased ROM  [x]Decreased functional mobility  [x]Decreased balance   [x]Decreased endurance   []Decreased posture  []Decreased sensation  []Decreased coordination   []Decreased vision  [x]Decreased safety awareness   []Increased pain       Patient and or family understand(s) diagnosis, prognosis, and plan of care.     Prognosis is good for reaching above PT goals    PHYSICAL THERAPY PLAN OF CARE:    PT POC is established based on physician order and patient diagnosis     Referring provider/PT Order: France Rich, DO/ PT eval and treat      Current Treatment Recommendations:     [x] Strengthening to improve independence with functional mobility   [] ROM to improve independence with functional mobility   [x] Balance Training to improve static/dynamic balance and to reduce fall risk  [x] Endurance Training to improve activity tolerance during functional mobility   [x] Transfer Training to improve safety and independence with all functional transfers   [x] Gait Training to improve gait mechanics, endurance and assess need for appropriate assistive device  [] Stair Training in preparation for safe discharge home and/or into the community   [] Positioning to prevent skin breakdown and contractures  [x] Safety and Education Training   [x] Patient/Caregiver Education   [] HEP  [] Other     PT long term treatment goals are located in above grid    Frequency of treatments: 2-5x/week x 7 days. Time in  0915  Time out  0933        Evaluation Time includes thorough review of current medical information, gathering information on past medical history/social history and prior level of function, completion of standardized testing/informal observation of tasks, assessment of data and education on plan of care and goals.       CPT codes:  [x] Low Complexity PT evaluation 12964  [] Moderate Complexity PT evaluation 51253  [] High Complexity PT evaluation 26327  [] PT Re-evaluation 87767  [] Gait training 13104 minutes  [] Manual therapy 59347 minutes  [] Therapeutic activities 07835 minutes  [] Therapeutic exercises 03915 minutes  [] Neuromuscular reeducation 22434 minutes     [facility] PSYCHIATRY PT [id number]

## 2021-10-21 NOTE — CARE COORDINATION
10/21/2021  Social Work Discharge [address] 10/19. Awaiting therapy evals. Pt is on room air and uses 2l HS at home via [address] [facility]. [address] is following. Plan is home with son and dil.  Electronically signed by JIMENEZ Bolton on 10/21/2021 at 9:39 AM

## 2021-10-21 NOTE — PROGRESS NOTES
Occupational Therapy  OCCUPATIONAL THERAPY INITIAL EVALUATION      Date:10/21/2021  Patient Name: Naina Hernandez  MRN: [id number]  : 1945  Room: [address] [place] [address] & [place], [place]         Date:10/21/2021                                                  Patient Name: Naina Hernandez    MRN: [id number]    : 1945    Room: [address] [place] [address]      Evaluating OT: Benny Shah OTR/L   [id number]      Referring Provider:Apollo Elmore DO    Specific Provider Orders/Date:OT eval and treat 10/20/2021      Diagnosis:  Dyspnea and respiratory abnormalities [R06.00, R06.89]  Acute on chronic combined systolic and diastolic congestive heart failure ([facility] [I50.43]  COVID [U07.1]  Pneumonia due to COVID-19 virus [U07.1, J12.82]     Pertinent Medical History: A fib, osteoporosis,     Precautions:  Fall Risk, O2,  DROPLET PLUS      Assessment of current deficits    [x] Functional mobility  [x]ADLs  [x] Strength               []Cognition    [x] Functional transfers   [x] IADLs         [x] Safety Awareness   [x]Endurance    [] Fine Coordination              [x] Balance      [] Vision/perception   []Sensation     []Gross Motor Coordination  [] ROM  [] Delirium                   [] Motor Control     OT PLAN OF CARE   OT POC based on physician orders, patient diagnosis and results of clinical assessment    Frequency/Duration  2-4 days/wk for 2 weeks PRN   Specific OT Treatment Interventions to include:   ADL retraining/adapted techniques and AE recommendations to increase functional independence within precautions                    Energy conservation techniques to improve tolerance for selfcare routine   Functional transfer/mobility training/DME recommendations for increased independence, safety and fall prevention         Patient/family education to increase safety and functional independence Environmental modifications for safe mobility and completion of ADLs                             Therapeutic activity to improve functional performance during ADLs. Therapeutic exercise to improve tolerance and functional strength for ADLs    Balance retraining/tolerance tasks for facilitation of postural control with dynamic challenges during ADLs . Positioning to improve functional independence    Recommended Adaptive Equipment: TBD     Home Living: Pt lives with son and daughter in law.  1 story     Equipment owned: walker,home O2    Prior Level of Function: Independent  with ADLs , assist   with IADLs; ambulated with walker       Pain Level: no pain this session ;   Cognition: A&O: 4/4;    Memory:  Good    Sequencing:  Good    Problem solving:  Fair +   Judgement/safety:  Fair+     Functional Assessment:  AM-PAC Daily Activity Raw Score: 17/24   Initial Eval Status  Date: 10/21/21 Treatment Status  Date: STGs = LTGs  Time frame: 10-14 days   Feeding Independent      Grooming SBA/set-up ,seated   Mod I    UB Dressing SBA/set-up   Mod I    LB Dressing Mod A   Mod I    Bathing Mod A   Mod I    Toileting Supervision   Mod I    Bed Mobility  Min A  Supine to sit   Mod I    Functional Transfers Min A  Sit-stand from bed, commode   Mod I    Functional Mobility Min A,w/walker  Ambulated to/from bathroom   Mod I  with good tolerance    Balance Sitting:     Static:  Independent     Dynamic:Min A  Standing: Min A  Mod I    Activity Tolerance Fair with light activity   Good  with ADL activity    Visual/  Perceptual Glasses: reading                 Hand Dominance right    AROM (PROM) Strength Additional Info:    RUE  WFL WFL good  and wfl FMC/dexterity noted during ADL tasks       LUE WFL WFL good  and wfl FMC/dexterity noted during ADL tasks       Hearing: WFL   Sensation:  No c/o numbness or tingling   Tone: WFL   Edema: none observed     Comments: Upon arrival patient lying in bed . At end of session, patient seated in chair  with call light and phone within reach, all lines and tubes intact. *ALARM ON      Overall patient demonstrated  decreased independence and safety during completion of ADL/functional transfer/mobility tasks. Pt would benefit from continued skilled OT to increase safety and independence with completion of ADL/IADL tasks for functional independence and quality of life. Rehab Potential: good for established goals     Patient / Family Goal: return home       Patient and/or family were instructed on functional diagnosis, prognosis/goals and OT plan of care. Demonstrated good  understanding. Eval Complexity: Low    Time In: 0900  Time Out: 0925      Min Units   OT Eval Low 97165 x  1   OT Eval Medium 11304      OT Eval High 55611      OT Re-Eval [id number]       Therapeutic Ex [phone]       Therapeutic Activities 31280       ADL/Self Care 46063       Orthotic Management 57788       Manual [id number]     Neuro Re-Ed 40366       Non-Billable Time          Evaluation Time additionally includes thorough review of current medical information, gathering information on past medical history/social history and prior level of function, interpretation of standardized testing/informal observation of tasks, assessment of data and development of plan of care and goals.             Deisy Live  OTR/L  OT [id number]

## 2021-10-22 LAB
ALBUMIN SERPL-MCNC: 3.2 G/DL (ref 3.5–5.2)
ALP BLD-CCNC: 112 U/L (ref 35–104)
ALT SERPL-CCNC: 12 U/L (ref 0–32)
ANION GAP SERPL CALCULATED.3IONS-SCNC: 8 MMOL/L (ref 7–16)
AST SERPL-CCNC: 24 U/L (ref 0–31)
BILIRUB SERPL-MCNC: 1.6 MG/DL (ref 0–1.2)
BUN BLDV-MCNC: 24 MG/DL (ref 6–23)
CALCIUM SERPL-MCNC: 8.5 MG/DL (ref 8.6–10.2)
CHLORIDE BLD-SCNC: 94 MMOL/L (ref 98–107)
CO2: 39 MMOL/L (ref 22–29)
CREAT SERPL-MCNC: 0.9 MG/DL (ref 0.5–1)
GFR AFRICAN AMERICAN: >60
GFR NON-AFRICAN AMERICAN: >60 ML/MIN/1.73
GLUCOSE BLD-MCNC: 121 MG/DL (ref 74–99)
MAGNESIUM: 2 MG/DL (ref 1.6–2.6)
POTASSIUM SERPL-SCNC: 3.9 MMOL/L (ref 3.5–5)
PRO-BNP: 5717 PG/ML (ref 0–125)
SODIUM BLD-SCNC: 141 MMOL/L (ref 132–146)
TOTAL PROTEIN: 5.7 G/DL (ref 6.4–8.3)
URINE CULTURE, ROUTINE: NORMAL

## 2021-10-22 PROCEDURE — 2700000000 HC OXYGEN THERAPY PER DAY

## 2021-10-22 PROCEDURE — 2060000000 HC ICU INTERMEDIATE R&B

## 2021-10-22 PROCEDURE — 80053 COMPREHEN METABOLIC PANEL: CPT

## 2021-10-22 PROCEDURE — 6370000000 HC RX 637 (ALT 250 FOR IP): Performed by: INTERNAL MEDICINE

## 2021-10-22 PROCEDURE — 6360000002 HC RX W HCPCS: Performed by: INTERNAL MEDICINE

## 2021-10-22 PROCEDURE — 99232 SBSQ HOSP IP/OBS MODERATE 35: CPT | Performed by: INTERNAL MEDICINE

## 2021-10-22 PROCEDURE — 2580000003 HC RX 258: Performed by: INTERNAL MEDICINE

## 2021-10-22 PROCEDURE — 36415 COLL VENOUS BLD VENIPUNCTURE: CPT

## 2021-10-22 PROCEDURE — 83735 ASSAY OF MAGNESIUM: CPT

## 2021-10-22 PROCEDURE — 2500000003 HC RX 250 WO HCPCS: Performed by: INTERNAL MEDICINE

## 2021-10-22 PROCEDURE — 83880 ASSAY OF NATRIURETIC PEPTIDE: CPT

## 2021-10-22 RX ADMIN — SUCRALFATE 1 G: 1 TABLET ORAL at 18:47

## 2021-10-22 RX ADMIN — Medication 10 ML: at 10:02

## 2021-10-22 RX ADMIN — PANTOPRAZOLE SODIUM 40 MG: 40 TABLET, DELAYED RELEASE ORAL at 05:32

## 2021-10-22 RX ADMIN — BUMETANIDE 1 MG: 0.25 INJECTION, SOLUTION INTRAMUSCULAR; INTRAVENOUS at 10:02

## 2021-10-22 RX ADMIN — Medication 10 ML: at 20:54

## 2021-10-22 RX ADMIN — ATORVASTATIN CALCIUM 10 MG: 10 TABLET, FILM COATED ORAL at 21:22

## 2021-10-22 RX ADMIN — DOFETILIDE 500 MCG: 0.5 CAPSULE ORAL at 21:22

## 2021-10-22 RX ADMIN — POTASSIUM CHLORIDE 40 MEQ: 1500 TABLET, EXTENDED RELEASE ORAL at 00:41

## 2021-10-22 RX ADMIN — ZINC SULFATE 220 MG (50 MG) CAPSULE 50 MG: CAPSULE at 21:22

## 2021-10-22 RX ADMIN — DOFETILIDE 500 MCG: 0.5 CAPSULE ORAL at 10:01

## 2021-10-22 RX ADMIN — Medication 10 ML: at 21:22

## 2021-10-22 RX ADMIN — PAROXETINE HYDROCHLORIDE 20 MG: 20 TABLET, FILM COATED ORAL at 10:01

## 2021-10-22 RX ADMIN — SUCRALFATE 1 G: 1 TABLET ORAL at 10:01

## 2021-10-22 RX ADMIN — FERROUS SULFATE TAB 325 MG (65 MG ELEMENTAL FE) 325 MG: 325 (65 FE) TAB at 10:00

## 2021-10-22 RX ADMIN — METOPROLOL TARTRATE 25 MG: 25 TABLET, FILM COATED ORAL at 10:01

## 2021-10-22 RX ADMIN — DEXAMETHASONE SODIUM PHOSPHATE 6 MG: 10 INJECTION INTRAMUSCULAR; INTRAVENOUS at 18:46

## 2021-10-22 RX ADMIN — Medication 1000 MG: at 21:22

## 2021-10-22 RX ADMIN — ZINC SULFATE 220 MG (50 MG) CAPSULE 50 MG: CAPSULE at 10:00

## 2021-10-22 RX ADMIN — SUCRALFATE 1 G: 1 TABLET ORAL at 12:19

## 2021-10-22 RX ADMIN — Medication 1000 MG: at 10:00

## 2021-10-22 RX ADMIN — Medication 2000 UNITS: at 10:00

## 2021-10-22 RX ADMIN — BUMETANIDE 1 MG: 0.25 INJECTION, SOLUTION INTRAMUSCULAR; INTRAVENOUS at 21:22

## 2021-10-22 RX ADMIN — METOPROLOL TARTRATE 25 MG: 25 TABLET, FILM COATED ORAL at 21:22

## 2021-10-22 ASSESSMENT — PAIN SCALES - GENERAL
PAINLEVEL_OUTOF10: 0
PAINLEVEL_OUTOF10: 0

## 2021-10-22 NOTE — CARE COORDINATION
10/22/2021  Social Work Discharge Planning:POS COVID 10/19. AMPAC is 17/24. Plan is home with son and dil. [facility] is following. Order will be needed. Pt is on room air and 2l HS which is her baseline at home with [address] [facility].   Electronically signed by JIMENEZ Phoenix on 10/22/2021 at 9:35 AM

## 2021-10-22 NOTE — PROGRESS NOTES
[facility] Hospitalist   Progress Note    Admitting Date and Time: 10/19/2021  5:09 PM  Admit Dx: Dyspnea and respiratory abnormalities [R06.00, R06.89]  Acute on chronic combined systolic and diastolic congestive heart failure (HCC) [I50.43]  COVID [U07.1]  Pneumonia due to COVID-19 virus [U07.1, J12.82]    Subjective:    Patient was admitted with Dyspnea and respiratory abnormalities [R06.00, R06.89]  Acute on chronic combined systolic and diastolic congestive heart failure ([address] [I50.43]  COVID [U07.1]  Pneumonia due to COVID-19 virus [U07.1, J12.82].  Patient denies fever, chills, cp, sob, n/v.     potassium chloride  40 mEq Oral Q6H    atorvastatin  10 mg Oral Nightly    dofetilide  500 mcg Oral BID    ferrous sulfate  325 mg Oral Daily with breakfast    metoprolol tartrate  25 mg Oral BID    pantoprazole  40 mg Oral QAM AC    PARoxetine  20 mg Oral QAM    sucralfate  1 g Oral TID WC    sodium chloride flush  5-40 mL IntraVENous 2 times per day    bumetanide  1 mg IntraVENous BID    dexamethasone  6 mg IntraVENous Q24H    vitamin C  1,000 mg Oral BID    zinc sulfate  50 mg Oral BID    Vitamin D  2,000 Units Oral Daily     sodium chloride flush, 5-40 mL, PRN  sodium chloride, 25 mL, PRN  polyethylene glycol, 17 g, Daily PRN  acetaminophen, 650 mg, Q6H PRN   Or  acetaminophen, 650 mg, Q6H PRN  benzonatate, 100 mg, TID PRN         Objective:    /71   Pulse 98   Temp 98 °F (36.7 °C) (Oral)   Resp 20   Ht 5' 6\" (1.676 m)   Wt 169 lb (76.7 kg)   SpO2 93%   BMI 27.28 kg/m²   Skin: warm and dry, no rash or erythema  Pulmonary/Chest: clear to auscultation bilaterally- no wheezes, rales or rhonchi, normal air movement, no respiratory distress  Cardiovascular: rhythm reg at rate of 96  Abdomen: soft, non-tender, non-distended, normal bowel sounds, no masses or organomegaly  Extremities: no cyanosis, no clubbing and pos for 1-2+ b/l le edema      Recent Labs     10/19/21  3654 10/19/21  2301 10/21/21  0620    139 140   K 4.1 3.6 3.2*   CL 94* 91* 93*   CO2 36* 35* 38*   BUN 19 19 22   CREATININE 0.9 0.8 0.9   GLUCOSE 93 95 130*   CALCIUM 9.0 8.6 8.5*       Recent Labs     10/19/21  1724 10/19/21  2301   WBC 5.9 4.8   RBC 4.79 4.47   HGB 13.7 13.2   HCT 45.9 43.9   MCV 95.8 98.2   MCH 28.6 29.5   MCHC 29.8* 30.1*   RDW 18.2* 18.0*    165   MPV 10.6 10.1       CMP:    Lab Results   Component Value Date     10/21/2021    K 3.2 10/21/2021    K 4.1 10/19/2021    CL 93 10/21/2021    CO2 38 10/21/2021    BUN 22 10/21/2021    CREATININE 0.9 10/21/2021    GFRAA >60 10/21/2021    LABGLOM >60 10/21/2021    GLUCOSE 130 10/21/2021    GLUCOSE 95 02/20/2012    PROT 5.8 10/21/2021    LABALBU 3.1 10/21/2021    LABALBU 4.1 02/20/2012    CALCIUM 8.5 10/21/2021    BILITOT 1.6 10/21/2021    ALKPHOS 111 10/21/2021    AST 22 10/21/2021    ALT 11 10/21/2021     Magnesium:    Lab Results   Component Value Date    MG 1.6 10/21/2021        Radiology:   CT HEAD WO CONTRAST   Final Result   No acute intracranial abnormality. XR CHEST PORTABLE   Final Result   New right CP angle blunting suggestive of small pleural effusion      New subpleural opacity in right lower lung lateral aspect may be atelectasis   or tracking pleural fluid. Consider also band like small consolidated   infiltrate from pneumonitis         US DUP LOWER EXTREMITIES BILATERAL VENOUS   Final Result   No evidence of DVT in either lower extremity. Assessment:    Active Problems:    COVID  Resolved Problems:    * No resolved hospital problems. *      Plan:  1. Acute respiratory failure with hypoxia wean o2 as able  2. Possible acute on chronic diastolic chf  Continue diuresis  3. Pneumonia due to covid 19 continue steroids  4. Hypokalemia monitor and replace prn  5. Elevated inr improved  6. Atrial fib continue med  7. Hyperlipidemia continue med  8.  Depression continue med        Electronically signed by Apollo Rogers, DO on 10/21/2021 at 11:17 PM

## 2021-10-23 LAB
ALBUMIN SERPL-MCNC: 3.1 G/DL (ref 3.5–5.2)
ALP BLD-CCNC: 110 U/L (ref 35–104)
ALT SERPL-CCNC: 15 U/L (ref 0–32)
ANION GAP SERPL CALCULATED.3IONS-SCNC: 7 MMOL/L (ref 7–16)
AST SERPL-CCNC: 39 U/L (ref 0–31)
BILIRUB SERPL-MCNC: 1.8 MG/DL (ref 0–1.2)
BUN BLDV-MCNC: 25 MG/DL (ref 6–23)
CALCIUM SERPL-MCNC: 8.5 MG/DL (ref 8.6–10.2)
CHLORIDE BLD-SCNC: 92 MMOL/L (ref 98–107)
CO2: 42 MMOL/L (ref 22–29)
CREAT SERPL-MCNC: 0.9 MG/DL (ref 0.5–1)
GFR AFRICAN AMERICAN: >60
GFR NON-AFRICAN AMERICAN: >60 ML/MIN/1.73
GLUCOSE BLD-MCNC: 131 MG/DL (ref 74–99)
INR BLD: 1.3
MAGNESIUM: 2.3 MG/DL (ref 1.6–2.6)
POTASSIUM SERPL-SCNC: 3.4 MMOL/L (ref 3.5–5)
PROTHROMBIN TIME: 13.9 SEC (ref 9.3–12.4)
SODIUM BLD-SCNC: 141 MMOL/L (ref 132–146)
TOTAL PROTEIN: 6.1 G/DL (ref 6.4–8.3)

## 2021-10-23 PROCEDURE — 2500000003 HC RX 250 WO HCPCS: Performed by: INTERNAL MEDICINE

## 2021-10-23 PROCEDURE — 2580000003 HC RX 258: Performed by: INTERNAL MEDICINE

## 2021-10-23 PROCEDURE — 80053 COMPREHEN METABOLIC PANEL: CPT

## 2021-10-23 PROCEDURE — 36415 COLL VENOUS BLD VENIPUNCTURE: CPT

## 2021-10-23 PROCEDURE — 6370000000 HC RX 637 (ALT 250 FOR IP): Performed by: INTERNAL MEDICINE

## 2021-10-23 PROCEDURE — 6360000002 HC RX W HCPCS: Performed by: INTERNAL MEDICINE

## 2021-10-23 PROCEDURE — 99232 SBSQ HOSP IP/OBS MODERATE 35: CPT | Performed by: INTERNAL MEDICINE

## 2021-10-23 PROCEDURE — 2700000000 HC OXYGEN THERAPY PER DAY

## 2021-10-23 PROCEDURE — 83735 ASSAY OF MAGNESIUM: CPT

## 2021-10-23 PROCEDURE — 85610 PROTHROMBIN TIME: CPT

## 2021-10-23 PROCEDURE — 2060000000 HC ICU INTERMEDIATE R&B

## 2021-10-23 RX ORDER — WARFARIN SODIUM 5 MG/1
5 TABLET ORAL
Status: COMPLETED | OUTPATIENT
Start: 2021-10-23 | End: 2021-10-23

## 2021-10-23 RX ORDER — POTASSIUM CHLORIDE 20 MEQ/1
40 TABLET, EXTENDED RELEASE ORAL ONCE
Status: COMPLETED | OUTPATIENT
Start: 2021-10-23 | End: 2021-10-23

## 2021-10-23 RX ADMIN — SUCRALFATE 1 G: 1 TABLET ORAL at 12:23

## 2021-10-23 RX ADMIN — ZINC SULFATE 220 MG (50 MG) CAPSULE 50 MG: CAPSULE at 08:25

## 2021-10-23 RX ADMIN — BUMETANIDE 1 MG: 0.25 INJECTION, SOLUTION INTRAMUSCULAR; INTRAVENOUS at 20:54

## 2021-10-23 RX ADMIN — Medication 1000 MG: at 08:25

## 2021-10-23 RX ADMIN — DOFETILIDE 500 MCG: 0.5 CAPSULE ORAL at 20:54

## 2021-10-23 RX ADMIN — ZINC SULFATE 220 MG (50 MG) CAPSULE 50 MG: CAPSULE at 20:53

## 2021-10-23 RX ADMIN — POTASSIUM CHLORIDE 40 MEQ: 1500 TABLET, EXTENDED RELEASE ORAL at 20:54

## 2021-10-23 RX ADMIN — METOPROLOL TARTRATE 25 MG: 25 TABLET, FILM COATED ORAL at 08:25

## 2021-10-23 RX ADMIN — Medication 10 ML: at 08:26

## 2021-10-23 RX ADMIN — SUCRALFATE 1 G: 1 TABLET ORAL at 16:56

## 2021-10-23 RX ADMIN — PANTOPRAZOLE SODIUM 40 MG: 40 TABLET, DELAYED RELEASE ORAL at 05:18

## 2021-10-23 RX ADMIN — SUCRALFATE 1 G: 1 TABLET ORAL at 08:25

## 2021-10-23 RX ADMIN — FERROUS SULFATE TAB 325 MG (65 MG ELEMENTAL FE) 325 MG: 325 (65 FE) TAB at 08:25

## 2021-10-23 RX ADMIN — DOFETILIDE 500 MCG: 0.5 CAPSULE ORAL at 08:25

## 2021-10-23 RX ADMIN — PAROXETINE HYDROCHLORIDE 20 MG: 20 TABLET, FILM COATED ORAL at 08:25

## 2021-10-23 RX ADMIN — Medication 1000 MG: at 20:54

## 2021-10-23 RX ADMIN — WARFARIN SODIUM 5 MG: 5 TABLET ORAL at 20:54

## 2021-10-23 RX ADMIN — DEXAMETHASONE SODIUM PHOSPHATE 6 MG: 10 INJECTION INTRAMUSCULAR; INTRAVENOUS at 19:35

## 2021-10-23 RX ADMIN — ATORVASTATIN CALCIUM 10 MG: 10 TABLET, FILM COATED ORAL at 20:54

## 2021-10-23 RX ADMIN — BUMETANIDE 1 MG: 0.25 INJECTION, SOLUTION INTRAMUSCULAR; INTRAVENOUS at 08:26

## 2021-10-23 RX ADMIN — METOPROLOL TARTRATE 25 MG: 25 TABLET, FILM COATED ORAL at 20:54

## 2021-10-23 RX ADMIN — Medication 2000 UNITS: at 08:25

## 2021-10-23 ASSESSMENT — PAIN SCALES - GENERAL
PAINLEVEL_OUTOF10: 0
PAINLEVEL_OUTOF10: 0

## 2021-10-23 NOTE — PLAN OF CARE
Problem: Airway Clearance - Ineffective  Goal: Achieve or maintain patent airway  Outcome: Met This Shift     Problem: Gas Exchange - Impaired  Goal: Absence of hypoxia  Outcome: Met This Shift  Goal: Promote optimal lung function  Outcome: Met This Shift     Problem: Breathing Pattern - Ineffective  Goal: Ability to achieve and maintain a regular respiratory rate  Outcome: Met This Shift     Problem: Body Temperature -  Risk of, Imbalanced  Goal: Ability to maintain a body temperature within defined limits  Outcome: Met This Shift  Goal: Will regain or maintain usual level of consciousness  Outcome: Met This Shift  Goal: Complications related to the disease process, condition or treatment will be avoided or minimized  Outcome: Met This Shift     Problem:  Body Temperature -  Risk of, Imbalanced  Goal: Ability to maintain a body temperature within defined limits  Outcome: Met This Shift  Goal: Will regain or maintain usual level of consciousness  Outcome: Met This Shift  Goal: Complications related to the disease process, condition or treatment will be avoided or minimized  Outcome: Met This Shift     Problem: Isolation Precautions - Risk of Spread of Infection  Goal: Prevent transmission of infection  Outcome: Met This Shift     Problem: Nutrition Deficits  Goal: Optimize nutritional status  Outcome: Met This Shift     Problem: Risk for Fluid Volume Deficit  Goal: Maintain normal heart rhythm  Outcome: Met This Shift  Goal: Maintain absence of muscle cramping  Outcome: Met This Shift  Goal: Maintain normal serum potassium, sodium, calcium, phosphorus, and pH  Outcome: Met This Shift     Problem: Loneliness or Risk for Loneliness  Goal: Demonstrate positive use of time alone when socialization is not possible  Outcome: Ongoing

## 2021-10-23 NOTE — PROGRESS NOTES
[facility] Hospitalist   Progress Note    Admitting Date and Time: 10/19/2021  5:09 PM  Admit Dx: Dyspnea and respiratory abnormalities [R06.00, R06.89]  Acute on chronic combined systolic and diastolic congestive heart failure (HCC) [I50.43]  COVID [U07.1]  Pneumonia due to COVID-19 virus [U07.1, J12.82]    Subjective:    Patient was admitted with Dyspnea and respiratory abnormalities [R06.00, R06.89]  Acute on chronic combined systolic and diastolic congestive heart failure ([address] [I50.43]  COVID [U07.1]  Pneumonia due to COVID-19 virus [U07.1, J12.82]. Patient denies fever, chills, cp, n/v. Pt c/o some sob.       atorvastatin  10 mg Oral Nightly    dofetilide  500 mcg Oral BID    ferrous sulfate  325 mg Oral Daily with breakfast    metoprolol tartrate  25 mg Oral BID    pantoprazole  40 mg Oral QAM AC    PARoxetine  20 mg Oral QAM    sucralfate  1 g Oral TID WC    sodium chloride flush  5-40 mL IntraVENous 2 times per day    bumetanide  1 mg IntraVENous BID    dexamethasone  6 mg IntraVENous Q24H    vitamin C  1,000 mg Oral BID    zinc sulfate  50 mg Oral BID    Vitamin D  2,000 Units Oral Daily     sodium chloride flush, 5-40 mL, PRN  sodium chloride, 25 mL, PRN  polyethylene glycol, 17 g, Daily PRN  acetaminophen, 650 mg, Q6H PRN   Or  acetaminophen, 650 mg, Q6H PRN  benzonatate, 100 mg, TID PRN         Objective:    /70   Pulse 96   Temp 97.8 °F (36.6 °C) (Oral)   Resp 18   Ht 5' 6\" (1.676 m)   Wt 169 lb (76.7 kg)   SpO2 97%   BMI 27.28 kg/m²   Skin: warm and dry, no rash or erythema  Pulmonary/Chest: clear to auscultation bilaterally- no wheezes, rales or rhonchi, normal air movement, no respiratory distress  Cardiovascular: rhythm reg at rate of 98  Abdomen: soft, non-tender, non-distended, normal bowel sounds, no masses or organomegaly  Extremities: no cyanosis, no clubbing and pos for 1-2+ b/l le edema      Recent Labs     10/21/21  0620 10/22/21  0509 10/23/21  1045    141 141   K 3.2* 3.9 3.4*   CL 93* 94* 92*   CO2 38* 39* 42*   BUN 22 24* 25*   CREATININE 0.9 0.9 0.9   GLUCOSE 130* 121* 131*   CALCIUM 8.5* 8.5* 8.5*       No results for input(s): WBC, RBC, HGB, HCT, MCV, MCH, MCHC, RDW, PLT, MPV in the last 72 hours. CMP:    Lab Results   Component Value Date     10/23/2021    K 3.4 10/23/2021    K 4.1 10/19/2021    CL 92 10/23/2021    CO2 42 10/23/2021    BUN 25 10/23/2021    CREATININE 0.9 10/23/2021    GFRAA >60 10/23/2021    LABGLOM >60 10/23/2021    GLUCOSE 131 10/23/2021    GLUCOSE 95 02/20/2012    PROT 6.1 10/23/2021    LABALBU 3.1 10/23/2021    LABALBU 4.1 02/20/2012    CALCIUM 8.5 10/23/2021    BILITOT 1.8 10/23/2021    ALKPHOS 110 10/23/2021    AST 39 10/23/2021    ALT 15 10/23/2021     Magnesium:    Lab Results   Component Value Date    MG 2.3 10/23/2021     PT/INR:    Lab Results   Component Value Date    PROTIME 13.9 10/23/2021    INR 1.3 10/23/2021        Radiology:   CT HEAD WO CONTRAST   Final Result   No acute intracranial abnormality. XR CHEST PORTABLE   Final Result   New right CP angle blunting suggestive of small pleural effusion      New subpleural opacity in right lower lung lateral aspect may be atelectasis   or tracking pleural fluid. Consider also band like small consolidated   infiltrate from pneumonitis         US DUP LOWER EXTREMITIES BILATERAL VENOUS   Final Result   No evidence of DVT in either lower extremity. Assessment:    Active Problems:    COVID    Acute respiratory failure with hypoxia (HCC)    Pneumonia due to COVID-19 virus    Atrial fibrillation (HCC)    Hyperlipidemia  Resolved Problems:    * No resolved hospital problems. *      Plan:  1. Acute respiratory failure with hypoxia wean o2 as able  2. Possible acute on chronic diastolic chf  Continue diuresis. Monitor lytes  3. Pneumonia due to covid 19 continue steroids  4. Hypokalemia monitor and replace prn  5.  Elevated inr improved  6. Atrial fib continue med  7. Hyperlipidemia continue med  8.  Depression continue med    Will ask cardiology to evaluate pt and further recommendations    Electronically signed by Calvin Cleaning DO on 10/23/2021 at 7:57 PM

## 2021-10-23 NOTE — PROGRESS NOTES
[facility] Hospitalist   Progress Note    Admitting Date and Time: 10/19/2021  5:09 PM  Admit Dx: Dyspnea and respiratory abnormalities [R06.00, R06.89]  Acute on chronic combined systolic and diastolic congestive heart failure (HCC) [I50.43]  COVID [U07.1]  Pneumonia due to COVID-19 virus [U07.1, J12.82]    Subjective:    Patient was admitted with Dyspnea and respiratory abnormalities [R06.00, R06.89]  Acute on chronic combined systolic and diastolic congestive heart failure ([address] [I50.43]  COVID [U07.1]  Pneumonia due to COVID-19 virus [U07.1, J12.82]. Patient denies fever, chills, cp, sob, n/v. Pt notes some improvement in legs.       atorvastatin  10 mg Oral Nightly    dofetilide  500 mcg Oral BID    ferrous sulfate  325 mg Oral Daily with breakfast    metoprolol tartrate  25 mg Oral BID    pantoprazole  40 mg Oral QAM AC    PARoxetine  20 mg Oral QAM    sucralfate  1 g Oral TID WC    sodium chloride flush  5-40 mL IntraVENous 2 times per day    bumetanide  1 mg IntraVENous BID    dexamethasone  6 mg IntraVENous Q24H    vitamin C  1,000 mg Oral BID    zinc sulfate  50 mg Oral BID    Vitamin D  2,000 Units Oral Daily     sodium chloride flush, 5-40 mL, PRN  sodium chloride, 25 mL, PRN  polyethylene glycol, 17 g, Daily PRN  acetaminophen, 650 mg, Q6H PRN   Or  acetaminophen, 650 mg, Q6H PRN  benzonatate, 100 mg, TID PRN         Objective:    /78   Pulse 87   Temp 97.5 °F (36.4 °C) (Oral)   Resp 16   Ht 5' 6\" (1.676 m)   Wt 169 lb (76.7 kg)   SpO2 100%   BMI 27.28 kg/m²   Skin: warm and dry, no rash or erythema  Pulmonary/Chest: clear to auscultation bilaterally- no wheezes, rales or rhonchi, normal air movement, no respiratory distress  Cardiovascular: rhythm reg at rate of 88  Abdomen: soft, non-tender, non-distended, normal bowel sounds, no masses or organomegaly  Extremities: no cyanosis, no clubbing and pos for 1-2+ b/l le but improving      Recent Labs 10/19/21  2301 10/21/21  0620 10/22/21  0540    140 141   K 3.6 3.2* 3.9   CL 91* 93* 94*   CO2 35* 38* 39*   BUN 19 22 24*   CREATININE 0.8 0.9 0.9   GLUCOSE 95 130* 121*   CALCIUM 8.6 8.5* 8.5*       Recent Labs     10/19/21  2301   WBC 4.8   RBC 4.47   HGB 13.2   HCT 43.9   MCV 98.2   MCH 29.5   MCHC 30.1*   RDW 18.0*      MPV 10.1       CMP:    Lab Results   Component Value Date     10/22/2021    K 3.9 10/22/2021    K 4.1 10/19/2021    CL 94 10/22/2021    CO2 39 10/22/2021    BUN 24 10/22/2021    CREATININE 0.9 10/22/2021    GFRAA >60 10/22/2021    LABGLOM >60 10/22/2021    GLUCOSE 121 10/22/2021    GLUCOSE 95 02/20/2012    PROT 5.7 10/22/2021    LABALBU 3.2 10/22/2021    LABALBU 4.1 02/20/2012    CALCIUM 8.5 10/22/2021    BILITOT 1.6 10/22/2021    ALKPHOS 112 10/22/2021    AST 24 10/22/2021    ALT 12 10/22/2021     Magnesium:    Lab Results   Component Value Date    MG 2.0 10/22/2021        Radiology:   CT HEAD WO CONTRAST   Final Result   No acute intracranial abnormality. XR CHEST PORTABLE   Final Result   New right CP angle blunting suggestive of small pleural effusion      New subpleural opacity in right lower lung lateral aspect may be atelectasis   or tracking pleural fluid. Consider also band like small consolidated   infiltrate from pneumonitis         US DUP LOWER EXTREMITIES BILATERAL VENOUS   Final Result   No evidence of DVT in either lower extremity. Assessment:    Active Problems:    COVID    Acute respiratory failure with hypoxia (HCC)    Pneumonia due to COVID-19 virus    Atrial fibrillation (HCC)    Hyperlipidemia  Resolved Problems:    * No resolved hospital problems. *      Plan:  1. Acute respiratory failure with hypoxia wean o2 as able  2. Possible acute on chronic diastolic chf  Continue diuresis. Monitor lytes  3. Pneumonia due to covid 19 continue steroids  4. Hypokalemia monitor and replace prn  5. Elevated inr improved  6.  Atrial fib continue med  7. Hyperlipidemia continue med  8.  Depression continue med        Electronically signed by Sp Iglesias DO on 10/22/2021 at 8:34 PM

## 2021-10-24 LAB
ALBUMIN SERPL-MCNC: 3.1 G/DL (ref 3.5–5.2)
ALP BLD-CCNC: 110 U/L (ref 35–104)
ALT SERPL-CCNC: 29 U/L (ref 0–32)
ANION GAP SERPL CALCULATED.3IONS-SCNC: 4 MMOL/L (ref 7–16)
AST SERPL-CCNC: 48 U/L (ref 0–31)
BILIRUB SERPL-MCNC: 1.8 MG/DL (ref 0–1.2)
BUN BLDV-MCNC: 25 MG/DL (ref 6–23)
CALCIUM SERPL-MCNC: 8.4 MG/DL (ref 8.6–10.2)
CHLORIDE BLD-SCNC: 93 MMOL/L (ref 98–107)
CO2: 45 MMOL/L (ref 22–29)
CREAT SERPL-MCNC: 0.9 MG/DL (ref 0.5–1)
GFR AFRICAN AMERICAN: >60
GFR NON-AFRICAN AMERICAN: >60 ML/MIN/1.73
GLUCOSE BLD-MCNC: 132 MG/DL (ref 74–99)
INR BLD: 1.3
MAGNESIUM: 1.9 MG/DL (ref 1.6–2.6)
POTASSIUM SERPL-SCNC: 3.8 MMOL/L (ref 3.5–5)
PROTHROMBIN TIME: 14.3 SEC (ref 9.3–12.4)
SODIUM BLD-SCNC: 142 MMOL/L (ref 132–146)
TOTAL PROTEIN: 5.4 G/DL (ref 6.4–8.3)

## 2021-10-24 PROCEDURE — 2060000000 HC ICU INTERMEDIATE R&B

## 2021-10-24 PROCEDURE — 36415 COLL VENOUS BLD VENIPUNCTURE: CPT

## 2021-10-24 PROCEDURE — 85610 PROTHROMBIN TIME: CPT

## 2021-10-24 PROCEDURE — 83735 ASSAY OF MAGNESIUM: CPT

## 2021-10-24 PROCEDURE — 6370000000 HC RX 637 (ALT 250 FOR IP): Performed by: NURSE PRACTITIONER

## 2021-10-24 PROCEDURE — 2580000003 HC RX 258: Performed by: INTERNAL MEDICINE

## 2021-10-24 PROCEDURE — 80053 COMPREHEN METABOLIC PANEL: CPT

## 2021-10-24 PROCEDURE — 6370000000 HC RX 637 (ALT 250 FOR IP): Performed by: INTERNAL MEDICINE

## 2021-10-24 PROCEDURE — 99232 SBSQ HOSP IP/OBS MODERATE 35: CPT | Performed by: INTERNAL MEDICINE

## 2021-10-24 PROCEDURE — 99222 1ST HOSP IP/OBS MODERATE 55: CPT | Performed by: INTERNAL MEDICINE

## 2021-10-24 PROCEDURE — 2500000003 HC RX 250 WO HCPCS: Performed by: INTERNAL MEDICINE

## 2021-10-24 PROCEDURE — 6360000002 HC RX W HCPCS: Performed by: INTERNAL MEDICINE

## 2021-10-24 PROCEDURE — 93005 ELECTROCARDIOGRAM TRACING: CPT | Performed by: NURSE PRACTITIONER

## 2021-10-24 RX ORDER — METOPROLOL SUCCINATE 25 MG/1
25 TABLET, EXTENDED RELEASE ORAL 2 TIMES DAILY
Status: DISCONTINUED | OUTPATIENT
Start: 2021-10-24 | End: 2021-10-29 | Stop reason: HOSPADM

## 2021-10-24 RX ORDER — WARFARIN SODIUM 5 MG/1
5 TABLET ORAL
Status: COMPLETED | OUTPATIENT
Start: 2021-10-24 | End: 2021-10-24

## 2021-10-24 RX ADMIN — ZINC SULFATE 220 MG (50 MG) CAPSULE 50 MG: CAPSULE at 08:37

## 2021-10-24 RX ADMIN — WARFARIN SODIUM 5 MG: 5 TABLET ORAL at 17:54

## 2021-10-24 RX ADMIN — PAROXETINE HYDROCHLORIDE 20 MG: 20 TABLET, FILM COATED ORAL at 08:36

## 2021-10-24 RX ADMIN — PANTOPRAZOLE SODIUM 40 MG: 40 TABLET, DELAYED RELEASE ORAL at 06:49

## 2021-10-24 RX ADMIN — Medication 2000 UNITS: at 08:36

## 2021-10-24 RX ADMIN — Medication 1000 MG: at 08:37

## 2021-10-24 RX ADMIN — BUMETANIDE 1 MG: 0.25 INJECTION, SOLUTION INTRAMUSCULAR; INTRAVENOUS at 08:37

## 2021-10-24 RX ADMIN — Medication 10 ML: at 21:14

## 2021-10-24 RX ADMIN — FERROUS SULFATE TAB 325 MG (65 MG ELEMENTAL FE) 325 MG: 325 (65 FE) TAB at 08:37

## 2021-10-24 RX ADMIN — SUCRALFATE 1 G: 1 TABLET ORAL at 08:36

## 2021-10-24 RX ADMIN — METOPROLOL TARTRATE 25 MG: 25 TABLET, FILM COATED ORAL at 08:37

## 2021-10-24 RX ADMIN — Medication 10 ML: at 08:37

## 2021-10-24 RX ADMIN — BENZONATATE 100 MG: 100 CAPSULE ORAL at 08:37

## 2021-10-24 RX ADMIN — BUMETANIDE 1 MG: 0.25 INJECTION, SOLUTION INTRAMUSCULAR; INTRAVENOUS at 21:14

## 2021-10-24 RX ADMIN — SUCRALFATE 1 G: 1 TABLET ORAL at 17:55

## 2021-10-24 RX ADMIN — DOFETILIDE 500 MCG: 0.5 CAPSULE ORAL at 08:37

## 2021-10-24 RX ADMIN — ATORVASTATIN CALCIUM 10 MG: 10 TABLET, FILM COATED ORAL at 21:14

## 2021-10-24 RX ADMIN — SUCRALFATE 1 G: 1 TABLET ORAL at 11:30

## 2021-10-24 RX ADMIN — METOPROLOL SUCCINATE 25 MG: 25 TABLET, EXTENDED RELEASE ORAL at 21:14

## 2021-10-24 RX ADMIN — DEXAMETHASONE SODIUM PHOSPHATE 6 MG: 10 INJECTION INTRAMUSCULAR; INTRAVENOUS at 17:55

## 2021-10-24 RX ADMIN — ZINC SULFATE 220 MG (50 MG) CAPSULE 50 MG: CAPSULE at 21:14

## 2021-10-24 ASSESSMENT — PAIN SCALES - GENERAL
PAINLEVEL_OUTOF10: 0
PAINLEVEL_OUTOF10: 0

## 2021-10-24 NOTE — CONSULTS
Inpatient Cardiology Consultation      Reason for Consult:  HFpEF    Consulting Physician: Dr Robbie Arias    Requesting Physician:  Dr Gabriela Live    Date of Consultation: 10/24/2021    HISTORY OF PRESENT ILLNESS: 69 yo  female known to Dr Morgan An last seen in office 8/11/2021 and Dr Eufemia Gregg 8/23/2021. PMH: HTN, HLD, CKD stage III, R breast carcinoma s/p masectomy s/p chemotherapy with chronic dyspnea since chemotherapy in 2009, Moderna Vaccine completed 4/2021, hemorrhagic gastritis 2/2021, Chronic AF, OAc with coumadin, Tikosyn stopped 5/2021, VHD, and lifelong non smoker. Information obtained from patients son Luz Grey whom she lives with-->Currently daughter-in-law has COVID-19 (vaccinated) diagnosed on 10/14/2021  BLE swelling (x 2-3 weeks) seeping fluids, hallucinating, LEVIN (chronic since chemotherapy), urine foul smelling, dizzy when standing. Some concern over her taking her diuretics son doesn't think she was taking them for last 4-5 days days. [facility] 10/19/2021 /70 HR 90's AF RVR, O2 saturation 83% on RA--> 2 liters NC--> 93%, she remains on 2 liters NC O2 saturation 100%. + COVID-19, WBC 5.9, Hgb 13.7, , K+ 4.1, Bun/Cr 19/0.9, lactic acid 2.1, troponin 36, p-BNP 5044, INR 8.1   BLE Dopplers: No DVT. CT Head nothing acute. CXR New right CP angle blunting suggestive of small pleural effusion. New subpleural opacity in right lower lung lateral aspect may be atelectasis or tracking pleural fluid. Consider also band like small consolidated infiltrate from pneumonitis  Received Decadron IV and IV diuretics--> placed on Bumex 1 mg IV BID  Coumadin held--> Vitamin K  ? LE cellulitis--> ATB's stopped on 10/20/2021  10/19/2021 Primary service ordered Tikosyn (Tikosyn had been stopped by EP as outpatient). 10/20/2021  Decadron 6 mg daily  Baricitinib deferred - pt chronically hypoxic and minimally symptomatic  Current /85 SR with frequent PAC's.       Spoke with juventino Grey [phone] as patient is in [facility] and did not answer her phone. Please note: past medical records were reviewed per electronic medical record (EMR) - see detailed reports under Past Medical/ Surgical History. Past Medical History:    1. Lifelong non smoker  2. Depression/Anxiety  3. 2009 R breast carcinoma s/p mastectomy and chemotherapy (AC, Taxol, carboplatin, and Herceptin)  4. L chest wall infusa-port  5. Lymphedema of the right arm requiring the patient to wear a sleeve on her right arm. 6. No history MI, HTN, DM, stroke or CHF. 7. R [address] presentation, 04/01/2014 with two month history of Sourav Mancera found to be in AF/RVR.  PBNP 1389, Tn normal.  8. Echo, 04/02/2014.  Moderate concentric LVH, global hypokinesis, EF 45-50%.  No significant valve disease.  E/E' 16.  LA felt to be normal in size.  Normal RVSP. 9. MPS, 04/02/2014.  Moderate area of anterior ischemia and hypokinesis.  EF 45%.    10. Cardiac catheterization, 04/03/2014.   Normal coronary arteries.  Global LV hypokinesis, EF 40%.  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 11. MINE guided DCCV, 04/03/2014.   Restoration of sinus rhythm.  No thrombus. LA felt to be enlarged.  EF 40-45%. 12. Recurrent AF noted 04/04/2014.  EP evaluation recommended sotalol therapy.   [id number] score 3.  13. Successful DCCV, 04/07/2014. Gaby Pérez subsequent monitoring, patient had short runs of junctional bradycardia as well as short runs of SVT without symptoms.  Sotalol continued, metoprolol stopped.    14. Echo, 05/27/2014. [place] 40.  LV mildly dilated.  Normal EF.  RVSP 44.  E/E' 21.  E/A 3.15.  Consistent with Stage III diastolic dysfunction.    15. CKD stage III  16. Anemia with heme positive stools documented 10/2014.   17. Colonoscopy 11/2014 no bleeding noted \"negative\"  18. 6/22/2015 Office visit ASA stopped (no CAD on Cath in 2014)  19. 24 Hour Holter 8/3/2015: PAF, SR/SB, No AVB, no evidence of chronotropic and successfully restored normal sinus rhythm  37. 2/9/2021 EGD-->Hemorrhagic gastritis. No active bleeding at this time. Resume [address] in 24 hours. 38. 2/15/2021 Toprol XL decreased to 12.5 mg QD due to baseline bradycardia with plans to discontinue at next office visit  39. Admission 2/26/2021 with GIB Sodium 139, potassium 3.4, BUN 27, creatinine 1.1, p-BNP 2977 (prior proBNP 1890), troponin negative x1, albumin 3.0, WBC 7.1, H/H 4.3/15.7 s/p 2U PRBC 6.7 s/p 1 U PRBC>> repeat hemoglobin 8.1. EKG AF 90's  40. 3/1/2021 EGD/Colonoscopy--> Hemorrhagic gastritis, sigmoid diverticulosis (Findings suggest a diverticular bleed which has since resolved. Unchanged hemorrhagic gastritis). 41. 3/3/2021 Discharge home on  [address]  42. 5/25/2021 EP outpatient visit--> AFL--> Tikosyn stopped  43. 6/1/2021 48 hour Holter: AF/AFL/AT burden ~93% with episodes of rapid ventricular rate. Brief episodes of sinus rhythm with PACs. Occasional PVCs. Device detected VE appears to be predominantly aberrancy during AF/AFL/AT. No patient triggered events  44. 6/3/2021 Toprol XL increased from 12.5 mg QD to 25 mg QD by EP  45. 7/1/2021 TTE Dr Neida Vo: EF 55-60%. TAPSE 1.8 cm. Severely dilated LA. Dilated RA. Moderate MR/TR. PASP 61 mmHg  46. 8/11/2021 EKG AF CVR  47. 8/23/2021 EKG AF CVR  48. 8/23/2021 Dr Mer Tan she tolerates the warfarin, perform MINE-guided cardioversion once INR \"therapeutic\" and initiate amiodarone therapy in an effort to maintain sinus rhythm. Left atrial appendage intervention should be considered only if warfarin therapy proves infeasible, and should recognize real and significant up-front risks of the intervention procedure required to access its theoretical future benefits (stroke prevention). 52. 8/2021 Started on Coumadin--> PCP managing INR's          Medications Prior to admit:  Prior to Admission medications    Medication Sig Start Date End Date Taking?  Authorizing Provider   ferrous sulfate (IRON 325) 325 (65 Fe) MG tablet Take 325 mg by mouth daily (with breakfast)   Yes Historical Provider, MD   metoprolol tartrate (LOPRESSOR) 25 MG tablet Take 1 tablet by mouth 2 times daily 6/3/21  Yes Cher Palomino DO   pantoprazole (PROTONIX) 40 MG tablet Take 1 tablet by mouth every morning (before breakfast) 3/4/21  Yes Flaco Kan, DO   sucralfate (CARAFATE) 1 GM tablet Take 1 tablet by mouth 3 times daily (with meals) 3/3/21  Yes Flaco Kan DO   bumetanide (BUMEX) 1 MG tablet Take 1 mg by mouth as needed Indications: May Take Addional Dose if Needed   Yes Historical Provider, MD   OXYGEN Inhale 2 L into the lungs nightly   Yes Historical Provider, MD   potassium chloride (KLOR-CON M) 20 MEQ extended release tablet Take 1 tablet by mouth daily 2/11/21  Yes Devyn Jolley MD   PARoxetine (PAXIL) 20 MG tablet Take 1 tablet by mouth every morning 10/4/17  Yes Rina Saini MD   atorvastatin (LIPITOR) 10 MG tablet Take 10 mg by mouth nightly    Yes Historical Provider, MD   ALPRAZolam (XANAX) 0.25 MG tablet Take 0.25 mg by mouth daily as needed for Anxiety.   3/24/16  Yes Historical Provider, MD   calcium carbonate (OSCAL) 500 MG TABS tablet Take 500 mg by mouth every morning    Yes Historical Provider, MD   dofetilide (TIKOSYN) 250 MCG capsule Take 500 mcg by mouth 2 times daily    Historical Provider, MD       Current Medications:    Current Facility-Administered Medications: warfarin (COUMADIN) daily dosing (placeholder), , Other, RX Placeholder  atorvastatin (LIPITOR) tablet 10 mg, 10 mg, Oral, Nightly  dofetilide (TIKOSYN) capsule 500 mcg, 500 mcg, Oral, BID  ferrous sulfate (IRON 325) tablet 325 mg, 325 mg, Oral, Daily with breakfast  metoprolol tartrate (LOPRESSOR) tablet 25 mg, 25 mg, Oral, BID  pantoprazole (PROTONIX) tablet 40 mg, 40 mg, Oral, QAM AC  PARoxetine (PAXIL) tablet 20 mg, 20 mg, Oral, QAM  sucralfate (CARAFATE) tablet 1 g, 1 g, Oral, TID WC  sodium chloride flush 0.9 % injection 5-40 mL, 5-40 mL, IntraVENous, 2 times per day  sodium chloride flush 0.9 % injection 5-40 mL, 5-40 mL, IntraVENous, PRN  0.9 % sodium chloride infusion, 25 mL, IntraVENous, PRN  polyethylene glycol (GLYCOLAX) packet 17 g, 17 g, Oral, Daily PRN  acetaminophen (TYLENOL) tablet 650 mg, 650 mg, Oral, Q6H PRN **OR** acetaminophen (TYLENOL) suppository 650 mg, 650 mg, Rectal, Q6H PRN  bumetanide (BUMEX) injection 1 mg, 1 mg, IntraVENous, BID  dexamethasone (DECADRON) injection 6 mg, 6 mg, IntraVENous, Q24H  ascorbic acid (VITAMIN C) tablet 1,000 mg, 1,000 mg, Oral, BID  zinc sulfate (ZINCATE) capsule 50 mg, 50 mg, Oral, BID  benzonatate (TESSALON) capsule 100 mg, 100 mg, Oral, TID PRN  Vitamin D (CHOLECALCIFEROL) tablet 2,000 Units, 2,000 Units, Oral, Daily    Allergies:  Sulfa antibiotics    Social History:    Lifelong non smoker  Denies ETOH/Illicit Drugs  Activity: Lives with son and daughter in law in one story with basement. Uses walker to ambulate. Code Status: DNR-CCA      Family History:       REVIEW OF SYSTEMS:  See HPI as patient did not answer phone. PHYSICAL EXAM:   /85   Pulse 92   Temp 97.2 °F (36.2 °C) (Oral)   Resp 17   Ht 5' 6\" (1.676 m)   Wt 169 lb (76.7 kg)   SpO2 100%   BMI 27.28 kg/m²   In order to limited COVID-19 exposure and preserve PPE physical exam not done.      DATA:    ECG as per Dr Cammie James interpretation, repeating EKG this afternoon  Tele strips: AF CVR    Diagnostic:    See HPI    Labs:   BMP:   Recent Labs     10/23/21  1045 10/24/21  0406    142   K 3.4* 3.8   CO2 42* 45*   BUN 25* 25*   CREATININE 0.9 0.9   LABGLOM >60 >60   CALCIUM 8.5* 8.4*     Mag:   Recent Labs     10/23/21  1045 10/24/21  0406   MG 2.3 1.9     TFT:   Lab Results   Component Value Date    TSH 1.180 10/19/2021      proBNP:   Recent Labs     10/22/21  0540   PROBNP 5,717*     PT/INR:   Recent Labs     10/23/21  1045 10/24/21  0406   PROTIME 13.9* 14.3*   INR 1.3 1.3     FASTING LIPID PANEL:  Lab Results   Component Value Date    CHOL 99 10/19/2021    HDL 28 10/19/2021    LDLCALC 51 10/19/2021    TRIG 102 10/19/2021     LIVER PROFILE:  Recent Labs     10/23/21  1045 10/24/21  0406   AST 39* 48*   ALT 15 29   LABALBU 3.1* 3.1*     ASSESSMENT:  1. COVID-19 Pneumonia in a vaccinated elderly female  2. HFpEF decompensated  3. PAF/PAFL--> AF CVR currently  4. Supra-theurpeutic INR on admission (8.1) s/p vitamin K--> 1.3--> Coumadin resumed 10/23/2021  5. VHD moderate MR/TR on TTE 7/2021 (EF 55-60%)  6. Dyslipidemia on statin  7. Pre-renal azotemia  8. Hx GIB in 2/2021  9. Hypokalemia--> supplemented and resolved    PLAN:  1. Continue IV Bumex: strict I&O, daily weights, and daily BMP  2. Stop Tikosyn (was stopped by EP 5/2021)  3. Change Lopressor to Toprol XL 25 mg BID  4. INR between 2-3  5. Can consider Watchman as outpatient  6. Follow-up with Dr Catie Reid on discharge    Discussed with Dr David Jacinto  Electronically signed by Cheryl Sim.  CANDICE Holder on 10/24/2021 at 10:32 AM

## 2021-10-24 NOTE — PROGRESS NOTES
Pharmacy Consultation Note  (Anticoagulant Dosing and Monitoring)    Initial consult date: 10/23/21  Consulting physician: Dr. Savage Feeling    Allergies:  Sulfa antibiotics    70 y.o. female      Ht Readings from Last 1 Encounters:   10/19/21 5' 6\" (1.676 m)     Wt Readings from Last 1 Encounters:   10/19/21 169 lb (76.7 kg)         Warfarin Indication Target   INR Range Home   Dose  (if applicable) Diet/Feeding Tube   afib 2-3 5 mg daily  --       Vitamin K or Blood product  Administration Date                 Warfarin drug-drug interactions  Start  Stop Home Med?  Comments                                 TSH:    Lab Results   Component Value Date    TSH 1.180 10/19/2021        Hepatic Function Panel:                            Lab Results   Component Value Date    ALKPHOS 110 10/23/2021    ALT 15 10/23/2021    AST 39 10/23/2021    PROT 6.1 10/23/2021    BILITOT 1.8 10/23/2021    BILIDIR 1.4 10/19/2021    IBILI 1.1 10/19/2021    LABALBU 3.1 10/23/2021    LABALBU 4.1 02/20/2012       Date Warfarin Dose INR Heparin or LMWH HBG/  HCT PLT Comment   10/23 5 mg  1.3 -- -- --                                          Assessment:  · 70year old female on warfarin for afib  · INR goal 2-3; home warfarin dose warfarin 5 mg daily   · 10/23: INR 1.3    Plan:  · Warfarin 5 mg tonight  · Daily PT/INR until the INR is stable within the therapeutic range  · Pharmacist will follow and monitor/adjust dosing as necessary    Vikash Navarro PharmD, BCPS 10/23/2021 8:28 PM

## 2021-10-24 NOTE — PROGRESS NOTES
Pharmacy Consultation Note  (Anticoagulant Dosing and Monitoring)    Initial consult date: 10/23/21  Consulting physician: Dr. Chato Lomeli    Allergies:  Sulfa antibiotics    70 y.o. female      Ht Readings from Last 1 Encounters:   10/19/21 5' 6\" (1.676 m)     Wt Readings from Last 1 Encounters:   10/19/21 169 lb (76.7 kg)         Warfarin Indication Target   INR Range Home   Dose  (if applicable) Diet/Feeding Tube   afib 2-3 5 mg daily  --       Vitamin K or Blood product  Administration Date                 Warfarin drug-drug interactions  Start  Stop Home Med? Comments                                 TSH:    Lab Results   Component Value Date    TSH 1.180 10/19/2021        Hepatic Function Panel:                            Lab Results   Component Value Date    ALKPHOS 110 10/24/2021    ALT 29 10/24/2021    AST 48 10/24/2021    PROT 5.4 10/24/2021    BILITOT 1.8 10/24/2021    BILIDIR 1.4 10/19/2021    IBILI 1.1 10/19/2021    LABALBU 3.1 10/24/2021    LABALBU 4.1 02/20/2012       Date Warfarin Dose INR Heparin or LMWH HBG/  HCT PLT Comment   10/23 5 mg  1.3 -- -- --    10/24 5 mg  1.3 -- -- --                                 Assessment:  · 70year old female on warfarin for afib  · INR goal 2-3; home warfarin dose warfarin 5 mg daily   · 10/23: INR 1.3  · 10/24:  INR 1.3    Plan:  · Warfarin 5 mg again tonight, if INR does not increase on 10/25 will increase dose  · Daily PT/INR until the INR is stable within the therapeutic range  · Pharmacist will follow and monitor/adjust dosing as necessary    Sondra Echeverria, Debora, BCPS 10/24/2021 11:05 AM

## 2021-10-25 LAB
ALBUMIN SERPL-MCNC: 3 G/DL (ref 3.5–5.2)
ALP BLD-CCNC: 112 U/L (ref 35–104)
ALT SERPL-CCNC: 30 U/L (ref 0–32)
ANION GAP SERPL CALCULATED.3IONS-SCNC: 5 MMOL/L (ref 7–16)
AST SERPL-CCNC: 37 U/L (ref 0–31)
BASOPHILS ABSOLUTE: 0 E9/L (ref 0–0.2)
BASOPHILS RELATIVE PERCENT: 0 % (ref 0–2)
BILIRUB SERPL-MCNC: 1.7 MG/DL (ref 0–1.2)
BLOOD CULTURE, ROUTINE: NORMAL
BUN BLDV-MCNC: 28 MG/DL (ref 6–23)
CALCIUM SERPL-MCNC: 8.4 MG/DL (ref 8.6–10.2)
CHLORIDE BLD-SCNC: 89 MMOL/L (ref 98–107)
CO2: 45 MMOL/L (ref 22–29)
CREAT SERPL-MCNC: 0.9 MG/DL (ref 0.5–1)
CULTURE, BLOOD 2: NORMAL
EKG ATRIAL RATE: 65 BPM
EKG P AXIS: 49 DEGREES
EKG P-R INTERVAL: 208 MS
EKG Q-T INTERVAL: 352 MS
EKG QRS DURATION: 76 MS
EKG QTC CALCULATION (BAZETT): 501 MS
EKG R AXIS: -111 DEGREES
EKG T AXIS: 112 DEGREES
EKG VENTRICULAR RATE: 122 BPM
EOSINOPHILS ABSOLUTE: 0 E9/L (ref 0.05–0.5)
EOSINOPHILS RELATIVE PERCENT: 0 % (ref 0–6)
GFR AFRICAN AMERICAN: >60
GFR NON-AFRICAN AMERICAN: >60 ML/MIN/1.73
GLUCOSE BLD-MCNC: 134 MG/DL (ref 74–99)
HCT VFR BLD CALC: 50.6 % (ref 34–48)
HEMOGLOBIN: 15.2 G/DL (ref 11.5–15.5)
IMMATURE GRANULOCYTES #: 0.04 E9/L
IMMATURE GRANULOCYTES %: 0.5 % (ref 0–5)
INR BLD: 1.6
LYMPHOCYTES ABSOLUTE: 1.11 E9/L (ref 1.5–4)
LYMPHOCYTES RELATIVE PERCENT: 13.7 % (ref 20–42)
MAGNESIUM: 1.9 MG/DL (ref 1.6–2.6)
MCH RBC QN AUTO: 28.8 PG (ref 26–35)
MCHC RBC AUTO-ENTMCNC: 30 % (ref 32–34.5)
MCV RBC AUTO: 96 FL (ref 80–99.9)
MONOCYTES ABSOLUTE: 0.35 E9/L (ref 0.1–0.95)
MONOCYTES RELATIVE PERCENT: 4.3 % (ref 2–12)
NEUTROPHILS ABSOLUTE: 6.62 E9/L (ref 1.8–7.3)
NEUTROPHILS RELATIVE PERCENT: 81.5 % (ref 43–80)
PDW BLD-RTO: 17.2 FL (ref 11.5–15)
PLATELET # BLD: 188 E9/L (ref 130–450)
PMV BLD AUTO: 10.5 FL (ref 7–12)
POTASSIUM SERPL-SCNC: 3.3 MMOL/L (ref 3.5–5)
PRO-BNP: 3490 PG/ML (ref 0–125)
PROTHROMBIN TIME: 17.8 SEC (ref 9.3–12.4)
RBC # BLD: 5.27 E12/L (ref 3.5–5.5)
SODIUM BLD-SCNC: 139 MMOL/L (ref 132–146)
TOTAL PROTEIN: 5.8 G/DL (ref 6.4–8.3)
WBC # BLD: 8.1 E9/L (ref 4.5–11.5)

## 2021-10-25 PROCEDURE — 85025 COMPLETE CBC W/AUTO DIFF WBC: CPT

## 2021-10-25 PROCEDURE — 83880 ASSAY OF NATRIURETIC PEPTIDE: CPT

## 2021-10-25 PROCEDURE — 6370000000 HC RX 637 (ALT 250 FOR IP): Performed by: INTERNAL MEDICINE

## 2021-10-25 PROCEDURE — 36415 COLL VENOUS BLD VENIPUNCTURE: CPT

## 2021-10-25 PROCEDURE — 2060000000 HC ICU INTERMEDIATE R&B

## 2021-10-25 PROCEDURE — 80053 COMPREHEN METABOLIC PANEL: CPT

## 2021-10-25 PROCEDURE — 83735 ASSAY OF MAGNESIUM: CPT

## 2021-10-25 PROCEDURE — 85610 PROTHROMBIN TIME: CPT

## 2021-10-25 PROCEDURE — 2500000003 HC RX 250 WO HCPCS: Performed by: INTERNAL MEDICINE

## 2021-10-25 PROCEDURE — 93010 ELECTROCARDIOGRAM REPORT: CPT | Performed by: INTERNAL MEDICINE

## 2021-10-25 PROCEDURE — 2580000003 HC RX 258: Performed by: INTERNAL MEDICINE

## 2021-10-25 PROCEDURE — 6360000002 HC RX W HCPCS: Performed by: INTERNAL MEDICINE

## 2021-10-25 PROCEDURE — 2700000000 HC OXYGEN THERAPY PER DAY

## 2021-10-25 PROCEDURE — 99233 SBSQ HOSP IP/OBS HIGH 50: CPT | Performed by: INTERNAL MEDICINE

## 2021-10-25 PROCEDURE — 6370000000 HC RX 637 (ALT 250 FOR IP): Performed by: NURSE PRACTITIONER

## 2021-10-25 PROCEDURE — 99232 SBSQ HOSP IP/OBS MODERATE 35: CPT | Performed by: INTERNAL MEDICINE

## 2021-10-25 RX ORDER — WARFARIN SODIUM 5 MG/1
5 TABLET ORAL
Status: COMPLETED | OUTPATIENT
Start: 2021-10-25 | End: 2021-10-25

## 2021-10-25 RX ORDER — POTASSIUM CHLORIDE 20 MEQ/1
40 TABLET, EXTENDED RELEASE ORAL ONCE
Status: COMPLETED | OUTPATIENT
Start: 2021-10-25 | End: 2021-10-25

## 2021-10-25 RX ADMIN — BUMETANIDE 1 MG: 0.25 INJECTION, SOLUTION INTRAMUSCULAR; INTRAVENOUS at 19:51

## 2021-10-25 RX ADMIN — POTASSIUM CHLORIDE 40 MEQ: 1500 TABLET, EXTENDED RELEASE ORAL at 11:26

## 2021-10-25 RX ADMIN — METOPROLOL SUCCINATE 25 MG: 25 TABLET, EXTENDED RELEASE ORAL at 09:00

## 2021-10-25 RX ADMIN — PANTOPRAZOLE SODIUM 40 MG: 40 TABLET, DELAYED RELEASE ORAL at 06:33

## 2021-10-25 RX ADMIN — BUMETANIDE 1 MG: 0.25 INJECTION, SOLUTION INTRAMUSCULAR; INTRAVENOUS at 09:00

## 2021-10-25 RX ADMIN — Medication 2000 UNITS: at 09:00

## 2021-10-25 RX ADMIN — ZINC SULFATE 220 MG (50 MG) CAPSULE 50 MG: CAPSULE at 09:00

## 2021-10-25 RX ADMIN — WARFARIN SODIUM 5 MG: 5 TABLET ORAL at 17:02

## 2021-10-25 RX ADMIN — SUCRALFATE 1 G: 1 TABLET ORAL at 09:00

## 2021-10-25 RX ADMIN — FERROUS SULFATE TAB 325 MG (65 MG ELEMENTAL FE) 325 MG: 325 (65 FE) TAB at 09:00

## 2021-10-25 RX ADMIN — PAROXETINE HYDROCHLORIDE 20 MG: 20 TABLET, FILM COATED ORAL at 09:00

## 2021-10-25 RX ADMIN — ATORVASTATIN CALCIUM 10 MG: 10 TABLET, FILM COATED ORAL at 19:51

## 2021-10-25 RX ADMIN — SUCRALFATE 1 G: 1 TABLET ORAL at 17:02

## 2021-10-25 RX ADMIN — ZINC SULFATE 220 MG (50 MG) CAPSULE 50 MG: CAPSULE at 19:51

## 2021-10-25 RX ADMIN — SUCRALFATE 1 G: 1 TABLET ORAL at 12:05

## 2021-10-25 RX ADMIN — BENZONATATE 100 MG: 100 CAPSULE ORAL at 09:00

## 2021-10-25 RX ADMIN — DEXAMETHASONE SODIUM PHOSPHATE 6 MG: 10 INJECTION INTRAMUSCULAR; INTRAVENOUS at 19:51

## 2021-10-25 RX ADMIN — ACETAMINOPHEN 650 MG: 325 TABLET ORAL at 02:48

## 2021-10-25 RX ADMIN — Medication 10 ML: at 19:51

## 2021-10-25 RX ADMIN — Medication 1000 MG: at 09:00

## 2021-10-25 RX ADMIN — METOPROLOL SUCCINATE 25 MG: 25 TABLET, EXTENDED RELEASE ORAL at 19:51

## 2021-10-25 RX ADMIN — Medication 10 ML: at 09:00

## 2021-10-25 ASSESSMENT — PAIN SCALES - GENERAL
PAINLEVEL_OUTOF10: 0
PAINLEVEL_OUTOF10: 0
PAINLEVEL_OUTOF10: 3

## 2021-10-25 NOTE — CARE COORDINATION
10/25/2021  Social Work Discharge Planning:COVID POS 10/19. [facility] 17/24. SW confirmed with son that plan is still to rertun home with them at [facility]. [address] following. Order will be needed. Pt is on IV Decadron. Pt is on 1l o2 here and uses 2l HS only via [employer]. Wean o2.  Electronically signed by JIMENEZ Mcguire on 10/25/2021 at 10:03 AM

## 2021-10-25 NOTE — PROGRESS NOTES
Pharmacy Consultation Note  (Anticoagulant Dosing and Monitoring)    Initial consult date: 10/23/2021  Consulting physician: Dr. Camila Ernandez    Allergies:  Sulfa antibiotics      Ht Readings from Last 1 Encounters:   10/19/21 5' 6\" (1.676 m)     Wt Readings from Last 1 Encounters:   10/19/21 169 lb (76.7 kg)       Warfarin Indication Target   INR [place] Dose  (if applicable) Diet/Feeding Tube   Atrial fibrillation 2-3 Warfarin 5 mg daily  --       Vitamin K or Blood product  Administration Date                 Warfarin drug-drug interactions  Start  Stop Home Med?  Comments                                 TSH:    Lab Results   Component Value Date    TSH 1.180 10/19/2021        Hepatic Function Panel:                         Lab Results   Component Value Date    ALKPHOS 112 10/25/2021    ALT 30 10/25/2021    AST 37 10/25/2021    PROT 5.8 10/25/2021    BILITOT 1.7 10/25/2021    BILIDIR 1.4 10/19/2021    IBILI 1.1 10/19/2021    LABALBU 3.0 10/25/2021    LABALBU 4.1 02/20/2012       Date Warfarin Dose INR Heparin or LMWH HBG/  HCT PLT Comment   10/23 5 mg  1.3 -- -- --    10/24 5 mg  1.3 -- -- --    10/25 5 mg 1.6 -- 15.2/50.6 188                        Assessment:  · Patient is a 70year old female on warfarin for atrial fibrillation  · INR goal 2-3; home warfarin dose warfarin 5 mg daily   · INR today = 1.6 (subtherapeutic, increased from 1.3 yesterday)    Plan:  · Will give warfarin 5 mg again tonight  · Daily PT/INR until the INR is stable within the therapeutic range  · Pharmacist will follow and monitor/adjust dosing as necessary    Nicki Argueta, BeatriceD, [facility]rin[facility] HospitalCP  10/25/2021  8:45 AM

## 2021-10-25 NOTE — PROGRESS NOTES
[facility] Hospitalist   Progress Note    Admitting Date and Time: 10/19/2021  5:09 PM  Admit Dx: Dyspnea and respiratory abnormalities [R06.00, R06.89]  Acute on chronic combined systolic and diastolic congestive heart failure (HCC) [I50.43]  COVID [U07.1]  Pneumonia due to COVID-19 virus [U07.1, J12.82]    Subjective:    Patient was admitted with Dyspnea and respiratory abnormalities [R06.00, R06.89]  Acute on chronic combined systolic and diastolic congestive heart failure ([address] [I50.43]  COVID [U07.1]  Pneumonia due to COVID-19 virus [U07.1, J12.82]. Patient denies fever, chills, cp, sob, n/v. Legs improving in size per pt.       metoprolol succinate  25 mg Oral BID    warfarin (COUMADIN) daily dosing (placeholder)   Other RX Placeholder    atorvastatin  10 mg Oral Nightly    ferrous sulfate  325 mg Oral Daily with breakfast    pantoprazole  40 mg Oral QAM AC    PARoxetine  20 mg Oral QAM    sucralfate  1 g Oral TID WC    sodium chloride flush  5-40 mL IntraVENous 2 times per day    bumetanide  1 mg IntraVENous BID    dexamethasone  6 mg IntraVENous Q24H    vitamin C  1,000 mg Oral BID    zinc sulfate  50 mg Oral BID    Vitamin D  2,000 Units Oral Daily     sodium chloride flush, 5-40 mL, PRN  sodium chloride, 25 mL, PRN  polyethylene glycol, 17 g, Daily PRN  acetaminophen, 650 mg, Q6H PRN   Or  acetaminophen, 650 mg, Q6H PRN  benzonatate, 100 mg, TID PRN         Objective:    /82   Pulse 118   Temp 97.9 °F (36.6 °C)   Resp 18   Ht 5' 6\" (1.676 m)   Wt 169 lb (76.7 kg)   SpO2 94%   BMI 27.28 kg/m²   Skin: warm and dry, no rash or erythema  Pulmonary/Chest: clear to auscultation bilaterally- no wheezes, rales or rhonchi, normal air movement, no respiratory distress  Cardiovascular: rhythm reg at rate of 110  Abdomen: soft, non-tender, non-distended, normal bowel sounds, no masses or organomegaly  Extremities: no cyanosis, no clubbing and pos for 1+ b/l le edema      Recent Labs     10/22/21  0540 10/23/21  1045 10/24/21  0406    141 142   K 3.9 3.4* 3.8   CL 94* 92* 93*   CO2 39* 42* 45*   BUN 24* 25* 25*   CREATININE 0.9 0.9 0.9   GLUCOSE 121* 131* 132*   CALCIUM 8.5* 8.5* 8.4*       No results for input(s): WBC, RBC, HGB, HCT, MCV, MCH, MCHC, RDW, PLT, MPV in the last 72 hours. CMP:    Lab Results   Component Value Date     10/24/2021    K 3.8 10/24/2021    K 4.1 10/19/2021    CL 93 10/24/2021    CO2 45 10/24/2021    BUN 25 10/24/2021    CREATININE 0.9 10/24/2021    GFRAA >60 10/24/2021    LABGLOM >60 10/24/2021    GLUCOSE 132 10/24/2021    GLUCOSE 95 02/20/2012    PROT 5.4 10/24/2021    LABALBU 3.1 10/24/2021    LABALBU 4.1 02/20/2012    CALCIUM 8.4 10/24/2021    BILITOT 1.8 10/24/2021    ALKPHOS 110 10/24/2021    AST 48 10/24/2021    ALT 29 10/24/2021     Magnesium:    Lab Results   Component Value Date    MG 1.9 10/24/2021     PT/INR:    Lab Results   Component Value Date    PROTIME 14.3 10/24/2021    INR 1.3 10/24/2021        Radiology:   CT HEAD WO CONTRAST   Final Result   No acute intracranial abnormality. XR CHEST PORTABLE   Final Result   New right CP angle blunting suggestive of small pleural effusion      New subpleural opacity in right lower lung lateral aspect may be atelectasis   or tracking pleural fluid. Consider also band like small consolidated   infiltrate from pneumonitis         US DUP LOWER EXTREMITIES BILATERAL VENOUS   Final Result   No evidence of DVT in either lower extremity. Assessment:    Active Problems:    COVID    Acute respiratory failure with hypoxia (HCC)    Pneumonia due to COVID-19 virus    Atrial fibrillation (HCC)    Hyperlipidemia    Acute on chronic combined systolic and diastolic congestive heart failure (HCC)  Resolved Problems:    * No resolved hospital problems. *      Plan:  1.  Acute respiratory failure with hypoxia wean o2 as able  2. acute on chronic diastolic chf  Continue diuresis.  Monitor lytes cardiology following  3. Pneumonia due to covid 19 continue steroids  4. Hypokalemia monitor and replace prn  5. Elevated inr improved  6. Atrial fib  meds adjusted continue anticoagulation monitor inr.  7. Hyperlipidemia continue med  8.  Depression continue med        Electronically signed by Irine Hodgkins, DO on 10/24/2021 at 11:19 PM

## 2021-10-25 NOTE — PROGRESS NOTES
INPATIENT CARDIOLOGY FOLLOW-UP    Name: Cruz Bower    Age: 70 y.o. Date of Admission: 10/19/2021  5:09 PM    Date of Service: 10/25/2021    Primary Cardiologist: Dr. Alexander Avilez    Chief Complaint: Follow-up for decompensated heart failure    Interim History:  No new overnight cardiac complaints. Currently with no complaints of CP, SOB, palpitations, dizziness, or lightheadedness. Telemetry shows atrial flutter with variable block. Rates ranging from 100-120. Review of Systems:   Negative except as described above    Problem List:  Patient Active Problem List   Diagnosis    Adenopathy    Malignant ne[facility]    Atrial fibrillation with RVR ([facility]    Dyspnea on exertion    Chronic anticoagulation    Iron deficiency anemia    Breast cancer metastasized to axillary lymph node (HCC)    Atrial flutter (HCC)    Bilateral pleural effusion    Syncope and collapse    Breast cancer metastasized to axillary lymph node, right (HCC)    Atrial tachycardia (HCC)    Rectal bleeding    GI bleed    COVID    Acute respiratory failure with hypoxia ([facility]    Pneumonia due to COVID-19 virus    Atrial fibrillation ([facility]    Hyperlipidemia    Acute on chronic combined systolic and diastolic congestive heart failure (HCC)       Current Medications:    Current Facility-Administered Medications:     warfarin (COUMADIN) tablet 5 mg, 5 mg, Oral, Once, Apollo Rogers DO    potassium chloride (KLOR-CON M) extended release tablet 40 mEq, 40 mEq, Oral, Once, Jeancarlos Schneider MD    metoprolol succinate (TOPROL XL) extended release tablet 25 mg, 25 mg, Oral, BID, Kelsey Wolfe.  CANDICE Garsia, 25 mg at 10/25/21 0900    warfarin (COUMADIN) daily dosing (placeholder), , Other, RX Placeholder, Apollo Rogers DO    atorvastatin (LIPITOR) tablet 10 mg, 10 mg, Oral, Nightly, Tea Nielsen MD, 10 mg at 10/24/21 2114    ferrous sulfate (IRON 325) tablet 325 mg, 325 mg, Oral, Daily with breakfast, Maya Gaming MD, 325 mg at 10/25/21 0900    pantoprazole (PROTONIX) tablet 40 mg, 40 mg, Oral, QAM AC, Tea Nielsen MD, 40 mg at 10/25/21 6521    PARoxetine (PAXIL) tablet 20 mg, 20 mg, Oral, QAM, Tea Nielsen MD, 20 mg at 10/25/21 0900    sucralfate (CARAFATE) tablet 1 g, 1 g, Oral, TID WC, Tea Nielsen MD, 1 g at 10/25/21 0900    sodium chloride flush 0.9 % injection 5-40 mL, 5-40 mL, IntraVENous, 2 times per day, Tea Nielsen MD, 10 mL at 10/25/21 0900    sodium chloride flush 0.9 % injection 5-40 mL, 5-40 mL, IntraVENous, PRN, Tea Nielsen MD    0.9 % sodium chloride infusion, 25 mL, IntraVENous, PRN, Tea Nielsen MD    polyethylene glycol (GLYCOLAX) packet 17 g, 17 g, Oral, Daily PRN, Autumn Church MD    acetaminophen (TYLENOL) tablet 650 mg, 650 mg, Oral, Q6H PRN, 650 mg at 10/25/21 0248 **OR** acetaminophen (TYLENOL) suppository 650 mg, 650 mg, Rectal, Q6H PRN, Autumn Church MD    bumetanide (BUMEX) injection 1 mg, 1 mg, IntraVENous, BID, Tea Nielsen MD, 1 mg at 10/25/21 0900    dexamethasone (DECADRON) injection 6 mg, 6 mg, IntraVENous, Q24H, Tea Nielsen MD, 6 mg at 10/24/21 1755    ascorbic acid (VITAMIN C) tablet 1,000 mg, 1,000 mg, Oral, BID, Tea Nielsen MD, 1,000 mg at 10/25/21 0900    zinc sulfate (ZINCATE) capsule 50 mg, 50 mg, Oral, BID, Tea Nielsen MD, 50 mg at 10/25/21 0900    benzonatate (TESSALON) capsule 100 mg, 100 mg, Oral, TID PRN, Autumn Church MD, 100 mg at 10/25/21 0900    Vitamin D (CHOLECALCIFEROL) tablet 2,000 Units, 2,000 Units, Oral, Daily, Tea Nielsen MD, 2,000 Units at 10/25/21 0900    Physical Exam:  BP (!) 125/98   Pulse 97   Temp 97.7 °F (36.5 °C)   Resp 19   Ht 5' 6\" (1.676 m)   Wt 169 lb (76.7 kg)   SpO2 95%   BMI 27.28 kg/m²   Wt Readings from Last 3 Encounters:   10/19/21 169 lb (76.7 kg)   08/23/21 169 lb (76.7 kg)   08/11/21 166 lb 4.8 oz (75.4 kg)     Patient is stable on room air.   Examined from doorway on account of being Covid positive and in order to preserve PPE. Intake/Output:    Intake/Output Summary (Last 24 hours) at 10/25/2021 1106  Last data filed at 10/25/2021 0318  Gross per 24 hour   Intake --   Output 1400 ml   Net -1400 ml     No intake/output data recorded.     Laboratory Tests:  Recent Labs     10/23/21  1045 10/24/21  0406 10/25/21  0301    142 139   K 3.4* 3.8 3.3*   CL 92* 93* 89*   CO2 42* 45* 45*   BUN 25* 25* 28*   CREATININE 0.9 0.9 0.9   GLUCOSE 131* 132* 134*   CALCIUM 8.5* 8.4* 8.4*     Lab Results   Component Value Date    MG 1.9 10/25/2021     Recent Labs     10/23/21  1045 10/24/21  0406 10/25/21  0301   ALKPHOS 110* 110* 112*   ALT 15 29 30   AST 39* 48* 37*   PROT 6.1* 5.4* 5.8*   BILITOT 1.8* 1.8* 1.7*   LABALBU 3.1* 3.1* 3.0*     Recent Labs     10/25/21  0301   WBC 8.1   RBC 5.27   HGB 15.2   HCT 50.6*   MCV 96.0   MCH 28.8   MCHC 30.0*   RDW 17.2*      MPV 10.5     Lab Results   Component Value Date    TROPONINI <0.01 02/26/2021    TROPONINI <0.01 05/31/2019    TROPONINI 0.02 05/30/2019     Lab Results   Component Value Date    INR 1.6 10/25/2021    INR 1.3 10/24/2021    INR 1.3 10/23/2021    PROTIME 17.8 (H) 10/25/2021    PROTIME 14.3 (H) 10/24/2021    PROTIME 13.9 (H) 10/23/2021     Lab Results   Component Value Date    TSH 1.180 10/19/2021     No results found for: LABA1C  No results found for: EAG  Lab Results   Component Value Date    CHOL 99 10/19/2021    CHOL 116 01/16/2019    CHOL 194 05/11/2015     Lab Results   Component Value Date    TRIG 102 10/19/2021    TRIG 114 01/16/2019    TRIG 166 (H) 05/11/2015     Lab Results   Component Value Date    HDL 28 10/19/2021    HDL 30 01/16/2019    HDL 28 05/11/2015     Lab Results   Component Value Date    LDLCALC 51 10/19/2021    LDLCALC 63 01/16/2019    LDLCALC 133 (H) 05/11/2015     Lab Results   Component Value Date    LABVLDL 20 10/19/2021    LABVLDL 23 01/16/2019    LABVLDL 33 05/11/2015     No results found for: CHOLHDLRATIO  Recent Labs     10/25/21  0301   PROBNP 3,490*       Cardiac Tests:      Echocardiogram: From 7/1/2021. EF is 55 to 60%. Normal RV function. Severe left atrial enlargement. Moderate pulmonary hypertension with a PASP of 61 mmHg. ASSESSMENT:  1. COVID-19 Pneumonia in a vaccinated elderly female  2. HFpEF decompensated  3. PAF/PAFL--> AF CVR currently  4. Supra-theurpeutic INR on admission (8.1) s/p vitamin K--> 1.3--> Coumadin resumed 10/23/2021  5. VHD moderate MR/TR on TTE 7/2021 (EF 55-60%)  6. Dyslipidemia on statin  7. Pre-renal azotemia  8. Hx GIB in 2/2021  9. Hypokalemia--> will give 40 mEq of KCl.     PLAN:  1. Continue IV Bumex: strict I&O, daily weights, and daily BMP. Good response of her. 1.4 L negative since yesterday. 2. Tikosyn has been discontinued. 3. Rate controlled with Toprol-XL 25 mg twice daily. 4. Goal INR is between 2 and 3.  5. She has had prior gastrointestinal bleeding on Xarelto. She is currently on Coumadin and came in with a supratherapeutic INR. Overall, she appears to be poor long-term candidate for anticoagulation and should be reasonable candidate for watchman left atrial appendage closure device. This should be considered as an outpatient. 6. Follow-up with Dr Omid Douglass on discharge. Alba Cerna MD, [facility]    NOTE: This report was transcribed using voice recognition software. Every effort was made to ensure accuracy; however, inadvertent computerized transcription errors may be present.

## 2021-10-26 LAB
ALBUMIN SERPL-MCNC: 3.1 G/DL (ref 3.5–5.2)
ALP BLD-CCNC: 106 U/L (ref 35–104)
ALT SERPL-CCNC: 30 U/L (ref 0–32)
ANION GAP SERPL CALCULATED.3IONS-SCNC: 3 MMOL/L (ref 7–16)
AST SERPL-CCNC: 32 U/L (ref 0–31)
BILIRUB SERPL-MCNC: 1.4 MG/DL (ref 0–1.2)
BUN BLDV-MCNC: 28 MG/DL (ref 6–23)
CALCIUM SERPL-MCNC: 8.4 MG/DL (ref 8.6–10.2)
CHLORIDE BLD-SCNC: 94 MMOL/L (ref 98–107)
CO2: 48 MMOL/L (ref 22–29)
CREAT SERPL-MCNC: 0.9 MG/DL (ref 0.5–1)
GFR AFRICAN AMERICAN: >60
GFR NON-AFRICAN AMERICAN: >60 ML/MIN/1.73
GLUCOSE BLD-MCNC: 149 MG/DL (ref 74–99)
INR BLD: 2.7
MAGNESIUM: 2.1 MG/DL (ref 1.6–2.6)
PHOSPHORUS: 4 MG/DL (ref 2.5–4.5)
POTASSIUM SERPL-SCNC: 4.1 MMOL/L (ref 3.5–5)
PROTHROMBIN TIME: 30.4 SEC (ref 9.3–12.4)
SODIUM BLD-SCNC: 145 MMOL/L (ref 132–146)
TOTAL PROTEIN: 5.7 G/DL (ref 6.4–8.3)

## 2021-10-26 PROCEDURE — 6360000002 HC RX W HCPCS: Performed by: INTERNAL MEDICINE

## 2021-10-26 PROCEDURE — 80053 COMPREHEN METABOLIC PANEL: CPT

## 2021-10-26 PROCEDURE — 6370000000 HC RX 637 (ALT 250 FOR IP): Performed by: NURSE PRACTITIONER

## 2021-10-26 PROCEDURE — 2580000003 HC RX 258: Performed by: INTERNAL MEDICINE

## 2021-10-26 PROCEDURE — 99232 SBSQ HOSP IP/OBS MODERATE 35: CPT | Performed by: INTERNAL MEDICINE

## 2021-10-26 PROCEDURE — 84100 ASSAY OF PHOSPHORUS: CPT

## 2021-10-26 PROCEDURE — 97530 THERAPEUTIC ACTIVITIES: CPT

## 2021-10-26 PROCEDURE — 85610 PROTHROMBIN TIME: CPT

## 2021-10-26 PROCEDURE — 6370000000 HC RX 637 (ALT 250 FOR IP): Performed by: INTERNAL MEDICINE

## 2021-10-26 PROCEDURE — 2700000000 HC OXYGEN THERAPY PER DAY

## 2021-10-26 PROCEDURE — 83735 ASSAY OF MAGNESIUM: CPT

## 2021-10-26 PROCEDURE — 2060000000 HC ICU INTERMEDIATE R&B

## 2021-10-26 PROCEDURE — 36415 COLL VENOUS BLD VENIPUNCTURE: CPT

## 2021-10-26 RX ORDER — WARFARIN SODIUM 1 MG/1
1 TABLET ORAL
Status: COMPLETED | OUTPATIENT
Start: 2021-10-26 | End: 2021-10-26

## 2021-10-26 RX ORDER — SODIUM CHLORIDE 9 MG/ML
INJECTION, SOLUTION INTRAVENOUS CONTINUOUS
Status: ACTIVE | OUTPATIENT
Start: 2021-10-26 | End: 2021-10-27

## 2021-10-26 RX ADMIN — DEXAMETHASONE SODIUM PHOSPHATE 6 MG: 10 INJECTION INTRAMUSCULAR; INTRAVENOUS at 19:12

## 2021-10-26 RX ADMIN — METOPROLOL SUCCINATE 25 MG: 25 TABLET, EXTENDED RELEASE ORAL at 21:11

## 2021-10-26 RX ADMIN — WARFARIN SODIUM 1 MG: 1 TABLET ORAL at 17:07

## 2021-10-26 RX ADMIN — PANTOPRAZOLE SODIUM 40 MG: 40 TABLET, DELAYED RELEASE ORAL at 05:39

## 2021-10-26 RX ADMIN — Medication 10 ML: at 08:31

## 2021-10-26 RX ADMIN — FERROUS SULFATE TAB 325 MG (65 MG ELEMENTAL FE) 325 MG: 325 (65 FE) TAB at 08:31

## 2021-10-26 RX ADMIN — Medication 10 ML: at 21:17

## 2021-10-26 RX ADMIN — ATORVASTATIN CALCIUM 10 MG: 10 TABLET, FILM COATED ORAL at 21:11

## 2021-10-26 RX ADMIN — Medication 2000 UNITS: at 08:30

## 2021-10-26 RX ADMIN — SUCRALFATE 1 G: 1 TABLET ORAL at 11:20

## 2021-10-26 RX ADMIN — Medication 1000 MG: at 11:20

## 2021-10-26 RX ADMIN — SUCRALFATE 1 G: 1 TABLET ORAL at 08:31

## 2021-10-26 RX ADMIN — Medication 1000 MG: at 21:10

## 2021-10-26 RX ADMIN — BENZONATATE 100 MG: 100 CAPSULE ORAL at 08:30

## 2021-10-26 RX ADMIN — SODIUM CHLORIDE, PRESERVATIVE FREE 10 ML: 5 INJECTION INTRAVENOUS at 19:12

## 2021-10-26 RX ADMIN — SUCRALFATE 1 G: 1 TABLET ORAL at 16:37

## 2021-10-26 RX ADMIN — PAROXETINE HYDROCHLORIDE 20 MG: 20 TABLET, FILM COATED ORAL at 08:31

## 2021-10-26 RX ADMIN — METOPROLOL SUCCINATE 25 MG: 25 TABLET, EXTENDED RELEASE ORAL at 08:31

## 2021-10-26 RX ADMIN — SODIUM CHLORIDE: 9 INJECTION, SOLUTION INTRAVENOUS at 21:09

## 2021-10-26 RX ADMIN — ZINC SULFATE 220 MG (50 MG) CAPSULE 50 MG: CAPSULE at 21:10

## 2021-10-26 RX ADMIN — ZINC SULFATE 220 MG (50 MG) CAPSULE 50 MG: CAPSULE at 08:30

## 2021-10-26 ASSESSMENT — PAIN SCALES - GENERAL
PAINLEVEL_OUTOF10: 0

## 2021-10-26 NOTE — PROGRESS NOTES
independetn   Transfers Sit to stand: min assist  Stand to sit: min assist  Stand pivot: min assist Sit <> stand SBA  Stand Pivot: SBA using WW for support SBA   Ambulation    25 feet x2 with ww with CGA 5 feet x 1 forward/backwards using [employer] for support SBA for balance 75 feet with ww with SBA   Stair Negotiation  Ascended and descended  NT      LE strength     3+/5    4-/5   balance      Fair with ww     AM-PAC Raw score               17/24 17/24        Pt is alert and able to follow instruction, states needs to move slowly, states unable to walk any distance today, initially states not getting OOB  Balance: fair during brief functional mobility using [employer] for support    Pt performed therapeutic exercise of the following: NT    Patient education  Pt was educated on UE usage to assist with transfers, gait/transfer safety promoting staying within [employer] base of support    Patient response to education:   Pt verbalized understanding Pt demonstrated skill Pt requires further education in this area   yes With repeated instruction for transfers yes     ASSESSMENT:   Comments: Nurse ok with Rx. Pt found in bed, agreed to rx, states wants to get to the EOB only. Pt assisted to EOB, sat EOB a prolonged time. Pt strongly encouraged to get OOB, finally agreed to this, very slow pivot performed bed to bedside chair using [employer] for support. Pt able to advance LEs without difficulty. Pt then sat in a chair, was further encouraged to attempt gait in the room. Pt states willing to walk a short distance, this performed without loss of balance or shortness of breath. Pt stood using [employer] for support SBA while receiving hygiene care. After rest, Pt then stood again from the bedside chair, brief donned.     Treatment: Pt practiced and was instructed in the following treatment: transfer safety, gait/WW mechanics    Pt was left in a bedside chair with call light in reach    Chair/bed alarm: chair alarm active    Time in 1341   Time out 1410   Total Treatment Time 29 minutes   CPT codes:     Therapeutic activities 09257 29 minutes   Therapeutic exercises 54388 0 minutes       Pt is showing limted progress toward established Physical Therapy goals as per brief functional mobility performed  Continue with physical therapy current plan of care promoting increased functional mobility next session.     Anshu Coppola PTA   License Number: [id number]

## 2021-10-26 NOTE — PROGRESS NOTES
Pharmacy Consultation Note  (Anticoagulant Dosing and Monitoring)    Initial consult date: 10/23/2021  Consulting physician: Dr. Marly Carlson    Allergies:  Sulfa antibiotics      Ht Readings from Last 1 Encounters:   10/19/21 5' 6\" (1.676 m)     Wt Readings from Last 1 Encounters:   10/19/21 169 lb (76.7 kg)       Warfarin Indication Target   INR [place] Dose  (if applicable) Diet/Feeding Tube   Atrial fibrillation 2-3 Warfarin 5 mg daily  --       Vitamin K or Blood product  Administration Date                 Warfarin drug-drug interactions  Start  Stop Home Med?  Comments                                 TSH:    Lab Results   Component Value Date    TSH 1.180 10/19/2021        Hepatic Function Panel:                         Lab Results   Component Value Date    ALKPHOS 106 10/26/2021    ALT 30 10/26/2021    AST 32 10/26/2021    PROT 5.7 10/26/2021    BILITOT 1.4 10/26/2021    BILIDIR 1.4 10/19/2021    IBILI 1.1 10/19/2021    LABALBU 3.1 10/26/2021    LABALBU 4.1 02/20/2012       Date Warfarin Dose INR Heparin or LMWH HBG/  HCT PLT Comment   10/23 5 mg  1.3 -- -- --    10/24 5 mg  1.3 -- -- --    10/25 5 mg 1.6 -- 15.2/50.6 188    10/26 1 mg 2.7 -- -- --               Assessment:  · Patient is a 70year old female on warfarin for atrial fibrillation  · INR goal 2-3; home warfarin dose warfarin 5 mg daily   · INR today = 2.7 (therapeutic, significantly increased from 1.6 yesterday)    Plan:  · Will give decrease dose of warfarin 1 mg tonight due to large increase in INR  · Daily PT/INR until the INR is stable within the therapeutic range  · Pharmacist will follow and monitor/adjust dosing as necessary    Xenia Piper, BeatriceD, BCCCP  10/26/2021  1:25 PM

## 2021-10-26 NOTE — PROGRESS NOTES
INPATIENT CARDIOLOGY FOLLOW-UP    Name: Angeline Silvestre    Age: 70 y.o. Date of Admission: 10/19/2021  5:09 PM    Date of Service: 10/26/2021    Primary Cardiologist: Dr. Jennifer Chavez    Chief Complaint: Follow-up for decompensated heart failure    Interim History:  No new overnight cardiac complaints. Currently with no complaints of CP, SOB, palpitations, dizziness, or lightheadedness. Telemetry shows atrial flutter with variable block. Rates ranging from 100-120. Continues to be on room air. 650 cc negative overnight. IV Bumex has been discontinued. Review of Systems:   Negative except as described above    Problem List:  Patient Active Problem List   Diagnosis    Adenopathy    [facility] ([facility]    Atrial fibrillation with RVR ([facility]    Dyspnea on exertion    Chronic anticoagulation    Iron deficiency anemia    Breast cancer metastasized to axillary lymph node (HCC)    Atrial flutter (HCC)    Bilateral pleural effusion    Syncope and collapse    Breast cancer metastasized to axillary lymph node, right (HCC)    Atrial tachycardia (HCC)    Rectal bleeding    GI bleed    COVID    Acute respiratory failure with hypoxia (HCC)    Pneumonia due to COVID-19 virus    Atrial fibrillation ([facility]    Hyperlipidemia    Acute on chronic combined systolic and diastolic congestive heart failure (HCC)       Current Medications:    Current Facility-Administered Medications:     metoprolol succinate (TOPROL XL) extended release tablet 25 mg, 25 mg, Oral, BID, Srikanth Garsia, CANDICE, 25 mg at 10/26/21 0831    warfarin (COUMADIN) daily dosing (placeholder), , Other, RX Placeholder, Apollo Rogers,     atorvastatin (LIPITOR) tablet 10 mg, 10 mg, Oral, Nightly, Tea Nielsen MD, 10 mg at 10/25/21 1951    ferrous sulfate (IRON 325) tablet 325 mg, 325 mg, Oral, Daily with breakfast, Tea Nielsen MD, 325 mg at 10/26/21 0831    pantoprazole (PROTONIX) tablet 40 mg, 40 mg, Oral, [facility], Pushpa Cruz MD, 40 mg at 10/26/21 0539    PARoxetine (PAXIL) tablet 20 mg, 20 mg, Oral, QAM, Tea Nielsen MD, 20 mg at 10/26/21 0831    sucralfate (CARAFATE) tablet 1 g, 1 g, Oral, TID WC, Tea Nielsen MD, 1 g at 10/26/21 0831    sodium chloride flush 0.9 % injection 5-40 mL, 5-40 mL, IntraVENous, 2 times per day, Pushpa Cruz MD, 10 mL at 10/26/21 0831    sodium chloride flush 0.9 % injection 5-40 mL, 5-40 mL, IntraVENous, PRN, Tea Nielsen MD    0.9 % sodium chloride infusion, 25 mL, IntraVENous, PRN, Tea Nielsen MD    polyethylene glycol (GLYCOLAX) packet 17 g, 17 g, Oral, Daily PRN, Pushpa Cruz MD    acetaminophen (TYLENOL) tablet 650 mg, 650 mg, Oral, Q6H PRN, 650 mg at 10/25/21 0248 **OR** acetaminophen (TYLENOL) suppository 650 mg, 650 mg, Rectal, Q6H PRN, Pushpa Cruz MD    dexamethasone (DECADRON) injection 6 mg, 6 mg, IntraVENous, Q24H, Tea Nielsen MD, 6 mg at 10/25/21 1951    ascorbic acid (VITAMIN C) tablet 1,000 mg, 1,000 mg, Oral, BID, Tea Nielsen MD, 1,000 mg at 10/25/21 0900    zinc sulfate (ZINCATE) capsule 50 mg, 50 mg, Oral, BID, Tea Nielsen MD, 50 mg at 10/26/21 0830    benzonatate (TESSALON) capsule 100 mg, 100 mg, Oral, TID PRN, Pushpa Cruz MD, 100 mg at 10/26/21 0830    Vitamin D (CHOLECALCIFEROL) tablet 2,000 Units, 2,000 Units, Oral, Daily, Tea Nielsen MD, 2,000 Units at 10/26/21 0830    Physical Exam:  /86   Pulse 126   Temp 97.1 °F (36.2 °C) (Oral)   Resp 18   Ht 5' 6\" (1.676 m)   Wt 169 lb (76.7 kg)   SpO2 95%   BMI 27.28 kg/m²   Wt Readings from Last 3 Encounters:   10/19/21 169 lb (76.7 kg)   08/23/21 169 lb (76.7 kg)   08/11/21 166 lb 4.8 oz (75.4 kg)     Patient is stable on room air. Examined from doorway on account of being Covid positive and in order to preserve PPE.     Intake/Output:    Intake/Output Summary (Last 24 hours) at 10/26/2021 1013  Last data filed at 10/26/2021 0540  Gross per 24 hour   Intake 420 ml   Output 1250 ml   Net -830 ml     No intake/output data recorded. Laboratory Tests:  Recent Labs     10/24/21  0406 10/25/21  0301 10/26/21  0540    139 145   K 3.8 3.3* 4.1   CL 93* 89* 94*   CO2 45* 45* 48*   BUN 25* 28* 28*   CREATININE 0.9 0.9 0.9   GLUCOSE 132* 134* 149*   CALCIUM 8.4* 8.4* 8.4*     Lab Results   Component Value Date    MG 2.1 10/26/2021     Recent Labs     10/24/21  0406 10/25/21  0301 10/26/21  0540   ALKPHOS 110* 112* 106*   ALT 29 30 30   AST 48* 37* 32*   PROT 5.4* 5.8* 5.7*   BILITOT 1.8* 1.7* 1.4*   LABALBU 3.1* 3.0* 3.1*     Recent Labs     10/25/21  0301   WBC 8.1   RBC 5.27   HGB 15.2   HCT 50.6*   MCV 96.0   MCH 28.8   MCHC 30.0*   RDW 17.2*      MPV 10.5     Lab Results   Component Value Date    TROPONINI <0.01 02/26/2021    TROPONINI <0.01 05/31/2019    TROPONINI 0.02 05/30/2019     Lab Results   Component Value Date    INR 2.7 10/26/2021    INR 1.6 10/25/2021    INR 1.3 10/24/2021    PROTIME 30.4 (H) 10/26/2021    PROTIME 17.8 (H) 10/25/2021    PROTIME 14.3 (H) 10/24/2021     Lab Results   Component Value Date    TSH 1.180 10/19/2021     No results found for: LABA1C  No results found for: EAG  Lab Results   Component Value Date    CHOL 99 10/19/2021    CHOL 116 01/16/2019    CHOL 194 05/11/2015     Lab Results   Component Value Date    TRIG 102 10/19/2021    TRIG 114 01/16/2019    TRIG 166 (H) 05/11/2015     Lab Results   Component Value Date    HDL 28 10/19/2021    HDL 30 01/16/2019    HDL 28 05/11/2015     Lab Results   Component Value Date    LDLCALC 51 10/19/2021    LDLCALC 63 01/16/2019    LDLCALC 133 (H) 05/11/2015     Lab Results   Component Value Date    LABVLDL 20 10/19/2021    LABVLDL 23 01/16/2019    LABVLDL 33 05/11/2015     No results found for: [facility]  Recent Labs     10/25/21  0301   PROBNP 3,490*       Cardiac Tests:      Echocardiogram: From 7/1/2021. EF is 55 to 60%. Normal RV function. Severe left atrial enlargement.   Moderate pulmonary hypertension with a PASP of 61 mmHg. ASSESSMENT:  1. COVID-19 Pneumonia in a vaccinated elderly female  2. HFpEF decompensated  3. PAF/PAFL--> AF CVR currently  4. Supra-theurpeutic INR on admission (8.1) s/p vitamin K--> 1.3--> Coumadin resumed 10/23/2021  5. VHD moderate MR/TR on TTE 7/2021 (EF 55-60%)  6. Dyslipidemia on statin  7. Pre-renal azotemia  8. Hx GIB in 2/2021  9. Hypokalemia--> will give 40 mEq of KCl.     PLAN:  1. Near euvolemic now. IV Bumex discontinued. Does have evidence of contraction alkalosis. Can likely start Bumex 1 mg oral daily starting tomorrow. 2. Tikosyn has been discontinued. 3. Rate control is with Toprol-XL 25 twice daily. Will consider up titration should there be room on pressures. 4. Goal INR is between 2 and 3.  5. She has had prior gastrointestinal bleeding on Xarelto. She is currently on Coumadin and came in with a supratherapeutic INR. Overall, she appears to be poor long-term candidate for anticoagulation and should be reasonable candidate for watchman left atrial appendage closure device. This should be considered as an outpatient. 6. Follow-up with Dr Wallace Daily on discharge. Apurva Corea MD, [address] [place] [address] Cardiology    NOTE: This report was transcribed using voice recognition software. Every effort was made to ensure accuracy; however, inadvertent computerized transcription errors may be present.

## 2021-10-26 NOTE — PROGRESS NOTES
10/26/2021  4:17 PM           RD Nutrition Re Screen/LOS Note    Pt admit w/Covid PNA, CHF. She is currently eating 50-75% at meals on Low Na diet and written diet copy in D/C instructions. No nonhealing or open wounds. No new nutrition recommendations. Will continue inpatient monitoring.       Electronically signed by Ben Hernandez RD, MAGDIEL, THUY on 10/26/21 at 4:17 PM EDT    Contact: [phone]

## 2021-10-26 NOTE — PROGRESS NOTES
[facility]on Weir Hospitalist   Progress Note    Admitting Date and Time: 10/19/2021  5:09 PM  Admit Dx: Dyspnea and respiratory abnormalities [R06.00, R06.89]  Acute on chronic combined systolic and diastolic congestive heart failure (HCC) [I50.43]  COVID [U07.1]  Pneumonia due to COVID-19 virus [U07.1, J12.82]    Subjective:    Patient was admitted with Dyspnea and respiratory abnormalities [R06.00, R06.89]  Acute on chronic combined systolic and diastolic congestive heart failure ([address] [I50.43]  COVID [U07.1]  Pneumonia due to COVID-19 virus [U07.1, J12.82].  Patient denies fever, chills, cp, sob, n/v.     metoprolol succinate  25 mg Oral BID    warfarin (COUMADIN) daily dosing (placeholder)   Other RX Placeholder    atorvastatin  10 mg Oral Nightly    ferrous sulfate  325 mg Oral Daily with breakfast    pantoprazole  40 mg Oral QAM AC    PARoxetine  20 mg Oral QAM    sucralfate  1 g Oral TID WC    sodium chloride flush  5-40 mL IntraVENous 2 times per day    bumetanide  1 mg IntraVENous BID    dexamethasone  6 mg IntraVENous Q24H    vitamin C  1,000 mg Oral BID    zinc sulfate  50 mg Oral BID    Vitamin D  2,000 Units Oral Daily     sodium chloride flush, 5-40 mL, PRN  sodium chloride, 25 mL, PRN  polyethylene glycol, 17 g, Daily PRN  acetaminophen, 650 mg, Q6H PRN   Or  acetaminophen, 650 mg, Q6H PRN  benzonatate, 100 mg, TID PRN         Objective:    /60   Pulse 94   Temp 97.5 °F (36.4 °C) (Oral)   Resp 18   Ht 5' 6\" (1.676 m)   Wt 169 lb (76.7 kg)   SpO2 91%   BMI 27.28 kg/m²   Skin: warm and dry, no rash or erythema  Pulmonary/Chest: clear to auscultation bilaterally- no wheezes, rales or rhonchi, normal air movement, no respiratory distress  Cardiovascular: rhythm reg at rate of 96  Abdomen: soft, non-tender, non-distended, normal bowel sounds, no masses or organomegaly  Extremities: no cyanosis, no clubbing and pos for 1+ b/l le edema      Recent Labs     10/23/21  8335 10/24/21  0406 10/25/21  0301    142 139   K 3.4* 3.8 3.3*   CL 92* 93* 89*   CO2 42* 45* 45*   BUN 25* 25* 28*   CREATININE 0.9 0.9 0.9   GLUCOSE 131* 132* 134*   CALCIUM 8.5* 8.4* 8.4*       Recent Labs     10/25/21  0301   WBC 8.1   RBC 5.27   HGB 15.2   HCT 50.6*   MCV 96.0   MCH 28.8   MCHC 30.0*   RDW 17.2*      MPV 10.5       CBC with Differential:    Lab Results   Component Value Date    WBC 8.1 10/25/2021    RBC 5.27 10/25/2021    HGB 15.2 10/25/2021    HCT 50.6 10/25/2021     10/25/2021    MCV 96.0 10/25/2021    MCH 28.8 10/25/2021    MCHC 30.0 10/25/2021    RDW 17.2 10/25/2021    SEGSPCT 53 02/12/2014    LYMPHOPCT 13.7 10/25/2021    MONOPCT 4.3 10/25/2021    EOSPCT 3 10/06/2010    BASOPCT 0.0 10/25/2021    MONOSABS 0.35 10/25/2021    LYMPHSABS 1.11 10/25/2021    EOSABS 0.00 10/25/2021    BASOSABS 0.00 10/25/2021     CMP:    Lab Results   Component Value Date     10/25/2021    K 3.3 10/25/2021    K 4.1 10/19/2021    CL 89 10/25/2021    CO2 45 10/25/2021    BUN 28 10/25/2021    CREATININE 0.9 10/25/2021    GFRAA >60 10/25/2021    LABGLOM >60 10/25/2021    GLUCOSE 134 10/25/2021    GLUCOSE 95 02/20/2012    PROT 5.8 10/25/2021    LABALBU 3.0 10/25/2021    LABALBU 4.1 02/20/2012    CALCIUM 8.4 10/25/2021    BILITOT 1.7 10/25/2021    ALKPHOS 112 10/25/2021    AST 37 10/25/2021    ALT 30 10/25/2021     Magnesium:    Lab Results   Component Value Date    MG 1.9 10/25/2021     PT/INR:    Lab Results   Component Value Date    PROTIME 17.8 10/25/2021    INR 1.6 10/25/2021        Radiology:   CT HEAD WO CONTRAST   Final Result   No acute intracranial abnormality. XR CHEST PORTABLE   Final Result   New right CP angle blunting suggestive of small pleural effusion      New subpleural opacity in right lower lung lateral aspect may be atelectasis   or tracking pleural fluid.   Consider also band like small consolidated   infiltrate from pneumonitis         US DUP LOWER EXTREMITIES BILATERAL VENOUS   Final Result   No evidence of DVT in either lower extremity. Assessment:    Active Problems:    COVID    Acute respiratory failure with hypoxia (HCC)    Pneumonia due to COVID-19 virus    Atrial fibrillation (HCC)    Hyperlipidemia    Acute on chronic combined systolic and diastolic congestive heart failure (HCC)  Resolved Problems:    * No resolved hospital problems. *      Plan:  1. Acute respiratory failure with hypoxia wean o2 as able  2. acute on chronic diastolic chf  Continue diuresis.  Monitor lytes cardiology following  3. Pneumonia due to covid 19 continue steroids  4. Hypokalemia monitor and replace prn  5. Elevated inr improved  6. Atrial fib  meds adjusted continue anticoagulation  Continue to monitor inr.  7. Hyperlipidemia continue med  8. Depression continue med    Will stop iv bumex tonight for tomorrow. Exam appears to be improving and labs showing volume contraction. Will reassess tomorrow    Hopeful discharge soon.      Electronically signed by Xiomara Hardin DO on 10/25/2021 at 8:55 PM

## 2021-10-26 NOTE — CARE COORDINATION
10/26/2021  Social Work Discharge Planning:COVID POS 10/19. [facility] 17/24. Plan is home with son and dil. [address] is following. HHC order will be needed. Pt is on 2 o2 here and uses 2l HS ONLY via [address]. Wean o2. If cant wean then new pulse ox testing and o2 order will be needed. Electronically signed by JIMENEZ Bolton on 10/26/2021 at 9:42 AM

## 2021-10-27 LAB
ALBUMIN SERPL-MCNC: 3 G/DL (ref 3.5–5.2)
ALP BLD-CCNC: 108 U/L (ref 35–104)
ALT SERPL-CCNC: 30 U/L (ref 0–32)
ANION GAP SERPL CALCULATED.3IONS-SCNC: 4 MMOL/L (ref 7–16)
AST SERPL-CCNC: 27 U/L (ref 0–31)
BILIRUB SERPL-MCNC: 1.4 MG/DL (ref 0–1.2)
BUN BLDV-MCNC: 30 MG/DL (ref 6–23)
CALCIUM SERPL-MCNC: 8.7 MG/DL (ref 8.6–10.2)
CHLORIDE BLD-SCNC: 94 MMOL/L (ref 98–107)
CO2: 45 MMOL/L (ref 22–29)
CREAT SERPL-MCNC: 0.8 MG/DL (ref 0.5–1)
GFR AFRICAN AMERICAN: >60
GFR NON-AFRICAN AMERICAN: >60 ML/MIN/1.73
GLUCOSE BLD-MCNC: 140 MG/DL (ref 74–99)
INR BLD: 2.9
POTASSIUM SERPL-SCNC: 4.1 MMOL/L (ref 3.5–5)
PROTHROMBIN TIME: 32 SEC (ref 9.3–12.4)
SODIUM BLD-SCNC: 143 MMOL/L (ref 132–146)
TOTAL PROTEIN: 5.3 G/DL (ref 6.4–8.3)

## 2021-10-27 PROCEDURE — 6360000002 HC RX W HCPCS: Performed by: INTERNAL MEDICINE

## 2021-10-27 PROCEDURE — 6370000000 HC RX 637 (ALT 250 FOR IP): Performed by: NURSE PRACTITIONER

## 2021-10-27 PROCEDURE — 80053 COMPREHEN METABOLIC PANEL: CPT

## 2021-10-27 PROCEDURE — 2580000003 HC RX 258: Performed by: INTERNAL MEDICINE

## 2021-10-27 PROCEDURE — 2700000000 HC OXYGEN THERAPY PER DAY

## 2021-10-27 PROCEDURE — 6370000000 HC RX 637 (ALT 250 FOR IP): Performed by: INTERNAL MEDICINE

## 2021-10-27 PROCEDURE — 99232 SBSQ HOSP IP/OBS MODERATE 35: CPT | Performed by: INTERNAL MEDICINE

## 2021-10-27 PROCEDURE — 85610 PROTHROMBIN TIME: CPT

## 2021-10-27 PROCEDURE — 2060000000 HC ICU INTERMEDIATE R&B

## 2021-10-27 PROCEDURE — 36415 COLL VENOUS BLD VENIPUNCTURE: CPT

## 2021-10-27 RX ORDER — BUMETANIDE 1 MG/1
1 TABLET ORAL DAILY
Status: DISCONTINUED | OUTPATIENT
Start: 2021-10-27 | End: 2021-10-27

## 2021-10-27 RX ORDER — WARFARIN SODIUM 2.5 MG/1
2.5 TABLET ORAL
Status: COMPLETED | OUTPATIENT
Start: 2021-10-27 | End: 2021-10-27

## 2021-10-27 RX ORDER — 0.9 % SODIUM CHLORIDE 0.9 %
500 INTRAVENOUS SOLUTION INTRAVENOUS ONCE
Status: COMPLETED | OUTPATIENT
Start: 2021-10-27 | End: 2021-10-28

## 2021-10-27 RX ORDER — SODIUM CHLORIDE 9 MG/ML
INJECTION, SOLUTION INTRAVENOUS CONTINUOUS
Status: ACTIVE | OUTPATIENT
Start: 2021-10-27 | End: 2021-10-27

## 2021-10-27 RX ADMIN — ZINC SULFATE 220 MG (50 MG) CAPSULE 50 MG: CAPSULE at 09:12

## 2021-10-27 RX ADMIN — SODIUM CHLORIDE: 9 INJECTION, SOLUTION INTRAVENOUS at 09:11

## 2021-10-27 RX ADMIN — Medication 2000 UNITS: at 09:12

## 2021-10-27 RX ADMIN — FERROUS SULFATE TAB 325 MG (65 MG ELEMENTAL FE) 325 MG: 325 (65 FE) TAB at 09:11

## 2021-10-27 RX ADMIN — METOPROLOL SUCCINATE 25 MG: 25 TABLET, EXTENDED RELEASE ORAL at 20:21

## 2021-10-27 RX ADMIN — WARFARIN SODIUM 2.5 MG: 2.5 TABLET ORAL at 16:43

## 2021-10-27 RX ADMIN — ZINC SULFATE 220 MG (50 MG) CAPSULE 50 MG: CAPSULE at 20:20

## 2021-10-27 RX ADMIN — Medication 10 ML: at 09:11

## 2021-10-27 RX ADMIN — BUMETANIDE 1 MG: 1 TABLET ORAL at 12:33

## 2021-10-27 RX ADMIN — ATORVASTATIN CALCIUM 10 MG: 10 TABLET, FILM COATED ORAL at 20:21

## 2021-10-27 RX ADMIN — Medication 1000 MG: at 09:12

## 2021-10-27 RX ADMIN — METOPROLOL SUCCINATE 25 MG: 25 TABLET, EXTENDED RELEASE ORAL at 09:12

## 2021-10-27 RX ADMIN — PANTOPRAZOLE SODIUM 40 MG: 40 TABLET, DELAYED RELEASE ORAL at 06:40

## 2021-10-27 RX ADMIN — DEXAMETHASONE SODIUM PHOSPHATE 6 MG: 10 INJECTION INTRAMUSCULAR; INTRAVENOUS at 20:20

## 2021-10-27 RX ADMIN — Medication 1000 MG: at 20:20

## 2021-10-27 RX ADMIN — SUCRALFATE 1 G: 1 TABLET ORAL at 16:43

## 2021-10-27 RX ADMIN — SODIUM CHLORIDE 500 ML: 9 INJECTION, SOLUTION INTRAVENOUS at 20:19

## 2021-10-27 RX ADMIN — PAROXETINE HYDROCHLORIDE 20 MG: 20 TABLET, FILM COATED ORAL at 09:12

## 2021-10-27 RX ADMIN — SUCRALFATE 1 G: 1 TABLET ORAL at 12:33

## 2021-10-27 RX ADMIN — Medication 10 ML: at 20:21

## 2021-10-27 RX ADMIN — SUCRALFATE 1 G: 1 TABLET ORAL at 09:12

## 2021-10-27 ASSESSMENT — PAIN SCALES - GENERAL
PAINLEVEL_OUTOF10: 0
PAINLEVEL_OUTOF10: 0

## 2021-10-27 NOTE — ADT AUTH CERT
Utilization Reviews       Heart Failure - Care Day 7 (10/25/2021) by Kvng Motley RN       Review Status Review Entered   Completed 10/27/2021 12:13      Criteria Review      Care Day: 7 Care Date: 10/25/2021 Level of Care: Intermediate Care    Guideline Day 3    Level Of Care    ( ) Floor to discharge    10/27/2021 12:13 PM EDT by Uma Obando      10/25 - intermediate unit    Clinical Status    ( ) * Hemodynamic stability    ( ) * Tachypnea absent    10/27/2021 12:13 PM EDT by Uma Obando      RR 19    ( ) * Oxygenation at baseline or acceptable for next level of care    10/27/2021 12:13 PM EDT by Valeria Castañeda weaned to [address] [place] [address] w/ sat 95%    ( ) * Dyspnea at baseline or acceptable for next level of care    ( ) * Cardiac rate and rhythm acceptable    ( ) * Pulmonary edema absent or acceptable for next level of care    ( ) * Peripheral or sacral edema absent or acceptable for next level of care    (X) * Mental status at baseline    10/27/2021 12:13 PM EDT by Uma Obando      a&o    ( ) * Volume status acceptable on oral medication    ( ) * Renal function at baseline or acceptable for next level of care    ( ) * Electrolyte abnormalities absent or acceptable for next level of care    ( ) * Precipitating factors absent or controlled    ( ) * Discharge plans and education understood    Activity    ( ) * Ambulatory or acceptable for next level of care    Routes    ( ) * Oral hydration    ( ) * Oral medications or regimen acceptable for next level of care    10/27/2021 12:13 PM EDT by Uma Obando      cont vit c 1gm bid  cont vit d 2000 units qd   cont zinc sulfate 50mg bid   cont iv decadron 6mg qd    (X) * Oral diet or acceptable for next level of care    10/27/2021 12:13 PM EDT by Uma Obando      tolerating low na diet fair    Interventions    ( ) * Oxygen absent    (X) Weigh    10/27/2021 12:13 PM EDT by Uma Obando      qd wts    Medications    (X) Diuretics    10/27/2021 12:13 PM EDT by Hari Olivares 1mg bid    cont iv decadron 6mg qd    cont tylenol 650mg q6h prn . Zurdo Hernandez x1    cont tessalon 100mg tid prn . Zurdo Hernandez x1    cont low na diet       SW NOTE:   10/25/2021   Social Work Discharge Planning:COVID POS 10/19. [facility] 17/24. SW confirmed with son that plan is still to rertun home with them at [facility]. [address] is following. Order will be needed. Pt is on IV Decadron. Pt is on 1l o2 here and uses 2l HS only via [facility]. Wean o2.

## 2021-10-27 NOTE — PROGRESS NOTES
INPATIENT CARDIOLOGY FOLLOW-UP    Name: Claudia Jacobo    Age: 70 y.o. Date of Admission: 10/19/2021  5:09 PM    Date of Service: 10/27/2021    Primary Cardiologist: Dr. Bijal Carmichael    Chief Complaint: Follow-up for decompensated heart failure    Interim History:  No new overnight cardiac complaints. Currently with no complaints of CP, SOB, palpitations, dizziness, or lightheadedness. Telemetry shows atrial flutter with variable block. Rates ranging from 100-120. Continues to be on room air. 400 cc negative overnight. Review of Systems:   Negative except as described above    Problem List:  Patient Active Problem List   Diagnosis    Adenopathy    Malignant neoplasm of breast [facility]    Atrial fibrillation with RVR ([address]    Dyspnea on exertion    Chronic anticoagulation    Iron deficiency anemia    Breast cancer metastasized to axillary lymph node ([address]    Atrial flutter (HCC)    Bilateral pleural effusion    Syncope and collapse    Breast cancer metastasized to axillary lymph node, right (HCC)    Atrial tachycardia (HCC)    Rectal bleeding    GI bleed    COVID    Acute respiratory failure with hypoxia (HCC)    Pneumonia due to COVID-19 virus    Atrial fibrillation ([address]    Hyperlipidemia    Acute on chronic combined systolic and diastolic congestive heart failure (HCC)    Hypovolemia       Current Medications:    Current Facility-Administered Medications:     warfarin (COUMADIN) tablet 2.5 mg, 2.5 mg, Oral, Once, Apollo Hric, DO    0.9 % sodium chloride infusion, , IntraVENous, Continuous, Apollo Hric, DO, Last Rate: 50 mL/hr at 10/27/21 0911, New Bag at 10/27/21 0911    bumetanide (BUMEX) tablet 1 mg, 1 mg, Oral, Daily, Sb Gonzalez MD    metoprolol succinate (TOPROL XL) extended release tablet 25 mg, 25 mg, Oral, BID, Aisha Sears.  CANDICE Garsia, 25 mg at 10/27/21 0912    warfarin (COUMADIN) daily dosing (placeholder), , Other, RX Placeholder, Apollo Hric, DO    atorvastatin (LIPITOR) tablet 10 mg, 10 mg, Oral, Nightly, Tea Nielsen MD, 10 mg at 10/26/21 2111    ferrous sulfate (IRON 325) tablet 325 mg, 325 mg, Oral, Daily with breakfast, Tea Nielsen MD, 325 mg at 10/27/21 0911    pantoprazole (PROTONIX) tablet 40 mg, 40 mg, Oral, QAM AC, Tea Nielsen MD, 40 mg at 10/27/21 0640    PARoxetine (PAXIL) tablet 20 mg, 20 mg, Oral, QAM, Tea Nielsen MD, 20 mg at 10/27/21 0912    sucralfate (CARAFATE) tablet 1 g, 1 g, Oral, TID WC, Tea Nielsen MD, 1 g at 10/27/21 0912    sodium chloride flush 0.9 % injection 5-40 mL, 5-40 mL, IntraVENous, 2 times per day, Torrie Beckett MD, 10 mL at 10/27/21 0911    sodium chloride flush 0.9 % injection 5-40 mL, 5-40 mL, IntraVENous, PRN, Tea Nielsen MD, 10 mL at 10/26/21 1912    0.9 % sodium chloride infusion, 25 mL, IntraVENous, PRN, Tea Nielsen MD    polyethylene glycol (GLYCOLAX) packet 17 g, 17 g, Oral, Daily PRN, Torrie Beckett MD    acetaminophen (TYLENOL) tablet 650 mg, 650 mg, Oral, Q6H PRN, 650 mg at 10/25/21 0248 **OR** acetaminophen (TYLENOL) suppository 650 mg, 650 mg, Rectal, Q6H PRN, Torrie Beckett MD    dexamethasone (DECADRON) injection 6 mg, 6 mg, IntraVENous, Q24H, Tea Nielsen MD, 6 mg at 10/26/21 1912    ascorbic acid (VITAMIN C) tablet 1,000 mg, 1,000 mg, Oral, BID, Tea Nielsen MD, 1,000 mg at 10/27/21 0912    zinc sulfate (ZINCATE) capsule 50 mg, 50 mg, Oral, BID, Tea Nielsen MD, 50 mg at 10/27/21 0912    benzonatate (TESSALON) capsule 100 mg, 100 mg, Oral, TID PRN, Torrie Beckett MD, 100 mg at 10/26/21 0830    Vitamin D (CHOLECALCIFEROL) tablet 2,000 Units, 2,000 Units, Oral, Daily, Tea Nielsen MD, 2,000 Units at 10/27/21 0912    Physical Exam:  /70   Pulse 101   Temp 98.5 °F (36.9 °C) (Oral)   Resp 18   Ht 5' 6\" (1.676 m)   Wt 169 lb (76.7 kg)   SpO2 94%   BMI 27.28 kg/m²   Wt Readings from Last 3 Encounters:   10/19/21 169 lb (76.7 kg)   08/23/21 169 lb (76.7 kg) 08/11/21 166 lb 4.8 oz (75.4 kg)     Patient is stable on room air. Examined from doorway on account of being Covid positive and in order to preserve PPE. Intake/Output:  No intake or output data in the 24 hours ending 10/27/21 1155  No intake/output data recorded.     Laboratory Tests:  Recent Labs     10/25/21  0301 10/26/21  0540 10/27/21  0645    145 143   K 3.3* 4.1 4.1   CL 89* 94* 94*   CO2 45* 48* 45*   BUN 28* 28* 30*   CREATININE 0.9 0.9 0.8   GLUCOSE 134* 149* 140*   CALCIUM 8.4* 8.4* 8.7     Lab Results   Component Value Date    MG 2.1 10/26/2021     Recent Labs     10/25/21  0301 10/26/21  0540 10/27/21  0645   ALKPHOS 112* 106* 108*   ALT 30 30 30   AST 37* 32* 27   PROT 5.8* 5.7* 5.3*   BILITOT 1.7* 1.4* 1.4*   LABALBU 3.0* 3.1* 3.0*     Recent Labs     10/25/21  0301   WBC 8.1   RBC 5.27   HGB 15.2   HCT 50.6*   MCV 96.0   MCH 28.8   MCHC 30.0*   RDW 17.2*      MPV 10.5     Lab Results   Component Value Date    TROPONINI <0.01 02/26/2021    TROPONINI <0.01 05/31/2019    TROPONINI 0.02 05/30/2019     Lab Results   Component Value Date    INR 2.9 10/27/2021    INR 2.7 10/26/2021    INR 1.6 10/25/2021    PROTIME 32.0 (H) 10/27/2021    PROTIME 30.4 (H) 10/26/2021    PROTIME 17.8 (H) 10/25/2021     Lab Results   Component Value Date    TSH 1.180 10/19/2021     No results found for: LABA1C  No results found for: EAG  Lab Results   Component Value Date    CHOL 99 10/19/2021    CHOL 116 01/16/2019    CHOL 194 05/11/2015     Lab Results   Component Value Date    TRIG 102 10/19/2021    TRIG 114 01/16/2019    TRIG 166 (H) 05/11/2015     Lab Results   Component Value Date    HDL 28 10/19/2021    HDL 30 01/16/2019    HDL 28 05/11/2015     Lab Results   Component Value Date    LDLCALC 51 10/19/2021    LDLCALC 63 01/16/2019    LDLCALC 133 (H) 05/11/2015     Lab Results   Component Value Date    LABVLDL 20 10/19/2021    LABVLDL 23 01/16/2019    LABVLDL 33 05/11/2015     No results found for: [facility]  Recent Labs     10/25/21  0301   PROBNP 3,490*       Cardiac Tests:      Echocardiogram: From 7/1/2021. EF is 55 to 60%. Normal RV function. Severe left atrial enlargement. Moderate pulmonary hypertension with a PASP of 61 mmHg. ASSESSMENT:  1. COVID-19 Pneumonia in a vaccinated elderly female  2. HFpEF decompensated  3. PAF/PAFL--> AF CVR currently  4. Supra-theurpeutic INR on admission (8.1) s/p vitamin K--> 1.3--> Coumadin resumed 10/23/2021  5. VHD moderate MR/TR on TTE 7/2021 (EF 55-60%)  6. Dyslipidemia on statin  7. Pre-renal azotemia  8. Hx GIB in 2/2021  9. Hypokalemia--> will give 40 mEq of KCl.     PLAN:  1. Near euvolemic now. IV Bumex discontinued. We will start her on Bumex 1 mg oral daily. Tikosyn has been discontinued. 2. Rate control is with Toprol-XL 25 twice daily. 3. Goal INR is between 2 and 3.  4. She has had prior gastrointestinal bleeding on Xarelto. She is currently on Coumadin and came in with a supratherapeutic INR. Overall, she appears to be poor long-term candidate for anticoagulation and should be reasonable candidate for watchman left atrial appendage closure device. This should be considered as an outpatient. 5. She should follow-up with Dr. Stanley Adams in 3 to 4 weeks after discharge  6. Patient will likely be discharged to home with home care. Luke Jaimes MD, [facility]    NOTE: This report was transcribed using voice recognition software. Every effort was made to ensure accuracy; however, inadvertent computerized transcription errors may be present.

## 2021-10-27 NOTE — PROGRESS NOTES
Heron Weir Hospitalist   Progress Note    Admitting Date and Time: 10/19/2021  5:09 PM  Admit Dx: Dyspnea and respiratory abnormalities [R06.00, R06.89]  Acute on chronic combined systolic and diastolic congestive heart failure (HCC) [I50.43]  COVID [U07.1]  Pneumonia due to COVID-19 virus [U07.1, J12.82]    Subjective:    Patient was admitted with Dyspnea and respiratory abnormalities [R06.00, R06.89]  Acute on chronic combined systolic and diastolic congestive heart failure ([address] [I50.43]  COVID [U07.1]  Pneumonia due to COVID-19 virus [U07.1, J12.82].  Patient denies fever, chills, cp, sob, n/v.     bumetanide  1 mg Oral Daily    sodium chloride  500 mL IntraVENous Once    metoprolol succinate  25 mg Oral BID    warfarin (COUMADIN) daily dosing (placeholder)   Other RX Placeholder    atorvastatin  10 mg Oral Nightly    ferrous sulfate  325 mg Oral Daily with breakfast    pantoprazole  40 mg Oral QAM AC    PARoxetine  20 mg Oral QAM    sucralfate  1 g Oral TID WC    sodium chloride flush  5-40 mL IntraVENous 2 times per day    dexamethasone  6 mg IntraVENous Q24H    vitamin C  1,000 mg Oral BID    zinc sulfate  50 mg Oral BID    Vitamin D  2,000 Units Oral Daily     sodium chloride flush, 5-40 mL, PRN  sodium chloride, 25 mL, PRN  polyethylene glycol, 17 g, Daily PRN  acetaminophen, 650 mg, Q6H PRN   Or  acetaminophen, 650 mg, Q6H PRN  benzonatate, 100 mg, TID PRN         Objective:    BP 96/66   Pulse 97   Temp 98.2 °F (36.8 °C)   Resp 18   Ht 5' 6\" (1.676 m)   Wt 169 lb (76.7 kg)   SpO2 94%   BMI 27.28 kg/m²   Skin: warm and dry, no rash or erythema  Pulmonary/Chest: clear to auscultation bilaterally- no wheezes, rales or rhonchi, normal air movement, no respiratory distress  Cardiovascular: rhythm reg at rate of 98  Abdomen: soft, non-tender, non-distended, normal bowel sounds, no masses or organomegaly  Extremities: no cyanosis, no clubbing and no edema      Recent Labs 10/25/21  0301 10/26/21  0540 10/27/21  0645    145 143   K 3.3* 4.1 4.1   CL 89* 94* 94*   CO2 45* 48* 45*   BUN 28* 28* 30*   CREATININE 0.9 0.9 0.8   GLUCOSE 134* 149* 140*   CALCIUM 8.4* 8.4* 8.7       Recent Labs     10/25/21  0301   WBC 8.1   RBC 5.27   HGB 15.2   HCT 50.6*   MCV 96.0   MCH 28.8   MCHC 30.0*   RDW 17.2*      MPV 10.5       CMP:    Lab Results   Component Value Date     10/27/2021    K 4.1 10/27/2021    K 4.1 10/19/2021    CL 94 10/27/2021    CO2 45 10/27/2021    BUN 30 10/27/2021    CREATININE 0.8 10/27/2021    GFRAA >60 10/27/2021    LABGLOM >60 10/27/2021    GLUCOSE 140 10/27/2021    GLUCOSE 95 02/20/2012    PROT 5.3 10/27/2021    LABALBU 3.0 10/27/2021    LABALBU 4.1 02/20/2012    CALCIUM 8.7 10/27/2021    BILITOT 1.4 10/27/2021    ALKPHOS 108 10/27/2021    AST 27 10/27/2021    ALT 30 10/27/2021     PT/INR:    Lab Results   Component Value Date    PROTIME 32.0 10/27/2021    INR 2.9 10/27/2021        Radiology:   CT HEAD WO CONTRAST   Final Result   No acute intracranial abnormality. XR CHEST PORTABLE   Final Result   New right CP angle blunting suggestive of small pleural effusion      New subpleural opacity in right lower lung lateral aspect may be atelectasis   or tracking pleural fluid. Consider also band like small consolidated   infiltrate from pneumonitis         US DUP LOWER EXTREMITIES BILATERAL VENOUS   Final Result   No evidence of DVT in either lower extremity. Assessment:    Active Problems:    COVID    Acute respiratory failure with hypoxia (HCC)    Pneumonia due to COVID-19 virus    Atrial fibrillation (HCC)    Hyperlipidemia    Acute on chronic combined systolic and diastolic congestive heart failure (HCC)    Hypovolemia  Resolved Problems:    * No resolved hospital problems. *      Plan:  1. Acute respiratory failure with hypoxia(H&P noted pt hypoxic in 80's in ER) wean o2 as able  2. acute on chronic diastolic chf  hold diuresis.  Monitor lytes cardiology following  3. Pneumonia due to covid 19 continue steroids  4. Hypokalemia monitor and replace prn  5. Elevated inr improved  6. Atrial fib  meds adjusted continue anticoagulation  Continue to monitor inr.  7. Hyperlipidemia continue med  8. Depression continue med  9. Hypovolemia see below. I still feel clinically that she needs a little more fluids before placing her back on maintenance diuretics. Will hold diuretic and reassess tomorrow. I have been given boluses of fluid and reassessing pt to try not to place in overload and having to be placed back on iv diuretics again.      Electronically signed by Xiomara Hardin DO on 10/27/2021 at 7:00 PM

## 2021-10-27 NOTE — PROGRESS NOTES
[facility] Hospitalist   Progress Note    Admitting Date and Time: 10/19/2021  5:09 PM  Admit Dx: Dyspnea and respiratory abnormalities [R06.00, R06.89]  Acute on chronic combined systolic and diastolic congestive heart failure (HCC) [I50.43]  COVID [U07.1]  Pneumonia due to COVID-19 virus [U07.1, J12.82]    Subjective:    Patient was admitted with Dyspnea and respiratory abnormalities [R06.00, R06.89]  Acute on chronic combined systolic and diastolic congestive heart failure ([address] [I50.43]  COVID [U07.1]  Pneumonia due to COVID-19 virus [U07.1, J12.82].  Patient denies fever, chills, cp, sob, n/v. Pt states she generally gets up slowly     metoprolol succinate  25 mg Oral BID    warfarin (COUMADIN) daily dosing (placeholder)   Other RX Placeholder    atorvastatin  10 mg Oral Nightly    ferrous sulfate  325 mg Oral Daily with breakfast    pantoprazole  40 mg Oral QAM AC    PARoxetine  20 mg Oral QAM    sucralfate  1 g Oral TID WC    sodium chloride flush  5-40 mL IntraVENous 2 times per day    dexamethasone  6 mg IntraVENous Q24H    vitamin C  1,000 mg Oral BID    zinc sulfate  50 mg Oral BID    Vitamin D  2,000 Units Oral Daily     sodium chloride flush, 5-40 mL, PRN  sodium chloride, 25 mL, PRN  polyethylene glycol, 17 g, Daily PRN  acetaminophen, 650 mg, Q6H PRN   Or  acetaminophen, 650 mg, Q6H PRN  benzonatate, 100 mg, TID PRN         Objective:    /89   Pulse 166   Temp 97.7 °F (36.5 °C)   Resp 18   Ht 5' 6\" (1.676 m)   Wt 169 lb (76.7 kg)   SpO2 92%   BMI 27.28 kg/m²   Skin: warm and dry, no rash or erythema  Pulmonary/Chest: clear to auscultation bilaterally- no wheezes, rales or rhonchi, normal air movement, no respiratory distress  Cardiovascular: rhythm reg at rate of 140  Abdomen: soft, non-tender, non-distended, normal bowel sounds, no masses or organomegaly  Extremities: no cyanosis, no clubbing and no edema      Recent Labs     10/24/21  0406 10/25/21  0301 10/26/21  0540    139 145   K 3.8 3.3* 4.1   CL 93* 89* 94*   CO2 45* 45* 48*   BUN 25* 28* 28*   CREATININE 0.9 0.9 0.9   GLUCOSE 132* 134* 149*   CALCIUM 8.4* 8.4* 8.4*       Recent Labs     10/25/21  0301   WBC 8.1   RBC 5.27   HGB 15.2   HCT 50.6*   MCV 96.0   MCH 28.8   MCHC 30.0*   RDW 17.2*      MPV 10.5       CMP:    Lab Results   Component Value Date     10/26/2021    K 4.1 10/26/2021    K 4.1 10/19/2021    CL 94 10/26/2021    CO2 48 10/26/2021    BUN 28 10/26/2021    CREATININE 0.9 10/26/2021    GFRAA >60 10/26/2021    LABGLOM >60 10/26/2021    GLUCOSE 149 10/26/2021    GLUCOSE 95 02/20/2012    PROT 5.7 10/26/2021    LABALBU 3.1 10/26/2021    LABALBU 4.1 02/20/2012    CALCIUM 8.4 10/26/2021    BILITOT 1.4 10/26/2021    ALKPHOS 106 10/26/2021    AST 32 10/26/2021    ALT 30 10/26/2021     Magnesium:    Lab Results   Component Value Date    MG 2.1 10/26/2021     Phosphorus:    Lab Results   Component Value Date    PHOS 4.0 10/26/2021     PT/INR:    Lab Results   Component Value Date    PROTIME 30.4 10/26/2021    INR 2.7 10/26/2021        Radiology:   CT HEAD WO CONTRAST   Final Result   No acute intracranial abnormality. XR CHEST PORTABLE   Final Result   New right CP angle blunting suggestive of small pleural effusion      New subpleural opacity in right lower lung lateral aspect may be atelectasis   or tracking pleural fluid. Consider also band like small consolidated   infiltrate from pneumonitis         US DUP LOWER EXTREMITIES BILATERAL VENOUS   Final Result   No evidence of DVT in either lower extremity. Assessment:    Active Problems:    COVID    Acute respiratory failure with hypoxia (HCC)    Pneumonia due to COVID-19 virus    Atrial fibrillation (HCC)    Hyperlipidemia    Acute on chronic combined systolic and diastolic congestive heart failure (HCC)  Resolved Problems:    * No resolved hospital problems. *      Plan:  1.  Acute respiratory failure with hypoxia wean o2 as able  2. acute on chronic diastolic chf  Continue diuresis.  Monitor lytes cardiology following  3. Pneumonia due to covid 19 continue steroids  4. Hypokalemia monitor and replace prn  5. Elevated inr improved  6. Atrial fib  meds adjusted continue anticoagulation  Continue to monitor inr.  7. Hyperlipidemia continue med  8. Depression continue med    Given labs, tachycardia and exam, I believe pt is hypovolemic.  It will give fluids and monitor    Electronically signed by Marcos Payton DO on 10/26/2021 at 8:50 PM

## 2021-10-27 NOTE — CARE COORDINATION
10/27/2021  Social Work Discharge Planning:[facility] [address] [facility]. HHC order will be needed. Pt is now on room air. Pt uses 2l HS ONLY via [employer]. Plan is home with son and dil.  Electronically signed by JIMENEZ Gurrola on 10/27/2021 at 9:27 AM

## 2021-10-28 LAB
ALBUMIN SERPL-MCNC: 2.9 G/DL (ref 3.5–5.2)
ALP BLD-CCNC: 101 U/L (ref 35–104)
ALT SERPL-CCNC: 27 U/L (ref 0–32)
ANION GAP SERPL CALCULATED.3IONS-SCNC: 5 MMOL/L (ref 7–16)
AST SERPL-CCNC: 25 U/L (ref 0–31)
BILIRUB SERPL-MCNC: 1.3 MG/DL (ref 0–1.2)
BUN BLDV-MCNC: 31 MG/DL (ref 6–23)
CALCIUM SERPL-MCNC: 8.4 MG/DL (ref 8.6–10.2)
CHLORIDE BLD-SCNC: 92 MMOL/L (ref 98–107)
CO2: 42 MMOL/L (ref 22–29)
CREAT SERPL-MCNC: 0.8 MG/DL (ref 0.5–1)
GFR AFRICAN AMERICAN: >60
GFR NON-AFRICAN AMERICAN: >60 ML/MIN/1.73
GLUCOSE BLD-MCNC: 141 MG/DL (ref 74–99)
INR BLD: 3
POTASSIUM SERPL-SCNC: 4.1 MMOL/L (ref 3.5–5)
PRO-BNP: 3595 PG/ML (ref 0–125)
PROTHROMBIN TIME: 32.9 SEC (ref 9.3–12.4)
SODIUM BLD-SCNC: 139 MMOL/L (ref 132–146)
TOTAL PROTEIN: 5.2 G/DL (ref 6.4–8.3)

## 2021-10-28 PROCEDURE — 97530 THERAPEUTIC ACTIVITIES: CPT

## 2021-10-28 PROCEDURE — 80053 COMPREHEN METABOLIC PANEL: CPT

## 2021-10-28 PROCEDURE — 2060000000 HC ICU INTERMEDIATE R&B

## 2021-10-28 PROCEDURE — 85610 PROTHROMBIN TIME: CPT

## 2021-10-28 PROCEDURE — 6370000000 HC RX 637 (ALT 250 FOR IP): Performed by: NURSE PRACTITIONER

## 2021-10-28 PROCEDURE — 6360000002 HC RX W HCPCS: Performed by: INTERNAL MEDICINE

## 2021-10-28 PROCEDURE — 2580000003 HC RX 258: Performed by: INTERNAL MEDICINE

## 2021-10-28 PROCEDURE — 6370000000 HC RX 637 (ALT 250 FOR IP): Performed by: INTERNAL MEDICINE

## 2021-10-28 PROCEDURE — 83880 ASSAY OF NATRIURETIC PEPTIDE: CPT

## 2021-10-28 PROCEDURE — 2700000000 HC OXYGEN THERAPY PER DAY

## 2021-10-28 PROCEDURE — 36415 COLL VENOUS BLD VENIPUNCTURE: CPT

## 2021-10-28 PROCEDURE — 99232 SBSQ HOSP IP/OBS MODERATE 35: CPT | Performed by: INTERNAL MEDICINE

## 2021-10-28 RX ORDER — WARFARIN SODIUM 4 MG/1
4 TABLET ORAL DAILY
Qty: 30 TABLET | Refills: 0 | Status: SHIPPED | OUTPATIENT
Start: 2021-10-28

## 2021-10-28 RX ORDER — BUMETANIDE 1 MG/1
1 TABLET ORAL DAILY
Qty: 30 TABLET | Refills: 0
Start: 2021-10-28

## 2021-10-28 RX ORDER — BENZONATATE 100 MG/1
100 CAPSULE ORAL 3 TIMES DAILY PRN
Qty: 30 CAPSULE | Refills: 0 | Status: SHIPPED | OUTPATIENT
Start: 2021-10-28 | End: 2021-11-07

## 2021-10-28 RX ORDER — WARFARIN SODIUM 2.5 MG/1
2.5 TABLET ORAL
Status: COMPLETED | OUTPATIENT
Start: 2021-10-28 | End: 2021-10-28

## 2021-10-28 RX ORDER — METOPROLOL SUCCINATE 25 MG/1
25 TABLET, EXTENDED RELEASE ORAL 2 TIMES DAILY
Qty: 30 TABLET | Refills: 0 | Status: SHIPPED | OUTPATIENT
Start: 2021-10-28

## 2021-10-28 RX ADMIN — SUCRALFATE 1 G: 1 TABLET ORAL at 12:36

## 2021-10-28 RX ADMIN — SUCRALFATE 1 G: 1 TABLET ORAL at 17:49

## 2021-10-28 RX ADMIN — Medication 1000 MG: at 21:01

## 2021-10-28 RX ADMIN — ZINC SULFATE 220 MG (50 MG) CAPSULE 50 MG: CAPSULE at 21:02

## 2021-10-28 RX ADMIN — WARFARIN SODIUM 2.5 MG: 2.5 TABLET ORAL at 17:49

## 2021-10-28 RX ADMIN — PAROXETINE HYDROCHLORIDE 20 MG: 20 TABLET, FILM COATED ORAL at 09:00

## 2021-10-28 RX ADMIN — ZINC SULFATE 220 MG (50 MG) CAPSULE 50 MG: CAPSULE at 08:59

## 2021-10-28 RX ADMIN — Medication 2000 UNITS: at 09:00

## 2021-10-28 RX ADMIN — Medication 10 ML: at 09:00

## 2021-10-28 RX ADMIN — SUCRALFATE 1 G: 1 TABLET ORAL at 09:00

## 2021-10-28 RX ADMIN — ATORVASTATIN CALCIUM 10 MG: 10 TABLET, FILM COATED ORAL at 21:02

## 2021-10-28 RX ADMIN — Medication 1000 MG: at 09:00

## 2021-10-28 RX ADMIN — FERROUS SULFATE TAB 325 MG (65 MG ELEMENTAL FE) 325 MG: 325 (65 FE) TAB at 09:00

## 2021-10-28 RX ADMIN — METOPROLOL SUCCINATE 25 MG: 25 TABLET, EXTENDED RELEASE ORAL at 21:01

## 2021-10-28 RX ADMIN — METOPROLOL SUCCINATE 25 MG: 25 TABLET, EXTENDED RELEASE ORAL at 09:00

## 2021-10-28 RX ADMIN — Medication 10 ML: at 21:01

## 2021-10-28 RX ADMIN — PANTOPRAZOLE SODIUM 40 MG: 40 TABLET, DELAYED RELEASE ORAL at 05:43

## 2021-10-28 RX ADMIN — DEXAMETHASONE SODIUM PHOSPHATE 6 MG: 10 INJECTION INTRAMUSCULAR; INTRAVENOUS at 17:49

## 2021-10-28 ASSESSMENT — PAIN SCALES - GENERAL
PAINLEVEL_OUTOF10: 0
PAINLEVEL_OUTOF10: 0

## 2021-10-28 NOTE — PROGRESS NOTES
Physical Therapy  Facility/Department: [address] INTERNAL MEDICINE 2  Daily Treatment Note  NAME: Haylee Carrero  : 1945  MRN: [id number]    Date of Service: 10/28/2021    Patient Diagnosis(es): The primary encounter diagnosis was COVID. Diagnoses of Dyspnea and respiratory abnormalities, Acute on chronic combined systolic and diastolic congestive heart failure ([address] and Persistent atrial fibrillation ([address] were also pertinent to this visit. has a past medical history of Acute on chronic combined systolic and diastolic congestive heart failure ([address] AF (atrial fibrillation) ([address] Anxiety, Atrial tachycardia ([address] Breast cancer ([address] Depression, Hyperlipidemia, Lymphadenopathy, Osteoporosis, Radiation, and Status post chemotherapy. has a past surgical history that includes Tunneled venous port placement; Tonsillectomy; bronchoscopy (2012); Mastectomy; Cardiac catheterization (Right, 2014); Cardioversion (2016); Mastectomy (Right, 2009); Cardioversion (2021); Upper gastrointestinal endoscopy (N/A, 2021); Upper gastrointestinal endoscopy (N/A, 3/1/2021); and Colonoscopy (N/A, 3/1/2021). Evaluating Therapist: Amy Abdalla PT           Room #:  0867/8236-Y  Diagnosis:  Dyspnea and respiratory abnormalities [R06.00, R06.89]  Acute on chronic combined systolic and diastolic congestive heart failure (HCC) [I50.43]  COVID [U07.1]  Pneumonia due to COVID-19 virus [U07.1, J12.82]  PMHx/PSHx:  Breast CA  Precautions:  Falls, O2, droplet plus isolation        Social:  Pt lives with son in a 1 floor plan.   Prior to admission independent with ww, uses O2 at night       Initial Evaluation  Date: 10/21/21 Treatment  10/28  Short Term/ Long Term   Goals   Was pt agreeable to Eval/treatment? yes Yes      Does pt have pain? no no     Bed Mobility  Rolling:  Min assist  Supine to sit: min assist  Sit to supine: NT  Scooting: min assist Rolling; SBA  Supine to sit:  SBA  Scooting: SBA to sitting EOB independetn   Transfers Sit to stand: min assist  Stand to sit: min assist  Stand pivot: min assist Sit <> stand Min A    Stand Pivot: Min A using [employer] for support SBA   Ambulation    25 feet x2 with ww with CGA 5 feet x 1 forward with w/w Min A 75 feet with ww with SBA   Stair Negotiation  Ascended and descended  NT  NT     LE strength     3+/5     4-/5   balance      Fair with ww       AM-PAC Raw score               17/24 17/24        Patient education  Pt educated on PT objectives during treatment session, hand placement during transfers, walker usage and safety. Patient response to education:   Pt verbalized understanding Pt demonstrated skill Pt requires further education in this area   yes With cueing yes     ASSESSMENT:    Comments:  Pt found in bed and left sitting up in the chair with call light in reach and chair alarm on. Treatment:  Patient practiced and was instructed in the following treatment:    Functional mobility performed as documented above. Pt reported feeling lightheaded during functional mobility. Cueing required for hand placement during transfers. Pt fearful of falling and self limiting ambulation. Pt with slow gait speed and takes shuffled steps. PLAN:    Patient is making good progress towards established goals. Will continue with current POC.      Time in  1455  Time out  1515    Total Treatment Time  20 minutes     CPT codes:    [x] Therapeutic activities 19943 20minutes  [] Therapeutic exercises 39877  minutes      Sindy Davies, [address]

## 2021-10-28 NOTE — PROGRESS NOTES
Pharmacy Consultation Note  (Anticoagulant Dosing and Monitoring)    Initial consult date: 10/23/2021  Consulting physician: Dr. Nadine Deutsch    Allergies:  Sulfa antibiotics      Ht Readings from Last 1 Encounters:   10/19/21 5' 6\" (1.676 m)     Wt Readings from Last 1 Encounters:   10/28/21 154 lb 4.8 oz (70 kg)       Warfarin Indication Target   INR [place] Dose  (if applicable) Diet/Feeding Tube   Atrial fibrillation 2-3 Warfarin 5 mg daily  --       Vitamin K or Blood product  Administration Date                 Warfarin drug-drug interactions  Start  Stop Home Med?  Comments                                 TSH:    Lab Results   Component Value Date    TSH 1.180 10/19/2021        Hepatic Function Panel:                         Lab Results   Component Value Date    ALKPHOS 101 10/28/2021    ALT 27 10/28/2021    AST 25 10/28/2021    PROT 5.2 10/28/2021    BILITOT 1.3 10/28/2021    BILIDIR 1.4 10/19/2021    IBILI 1.1 10/19/2021    LABALBU 2.9 10/28/2021    LABALBU 4.1 02/20/2012       Date Warfarin Dose INR Heparin or LMWH HBG/  HCT PLT Comment   10/23 5 mg  1.3 -- -- --    10/24 5 mg  1.3 -- -- --    10/25 5 mg 1.6 -- 15.2/50.6 188    10/26 1 mg 2.7 -- -- --    10/27 2.5 mg 2.9 -- -- --    10/28 2.5 mg 3 -- -- --                        Assessment:  · Patient is a 70year old female on warfarin for atrial fibrillation  · INR goal 2-3; home warfarin dose warfarin 5 mg daily   · INR today = 3 (therapeutic, slightly increased from 2.9 yesterday)    Plan:  · Will give warfarin 2.5 mg again tonight   · Daily PT/INR until the INR is stable within the therapeutic range  · Pharmacist will follow and monitor/adjust dosing as necessary    aJquan Matthews PharmD, BCCCP  10/28/2021  11:47 AM

## 2021-10-28 NOTE — DISCHARGE SUMMARY
[facility] Physician Discharge Summary     [facility] [address] [place] [address] Drive [address] [phone]    In 3 weeks      Activity level   As tolerated    Disposition   Home with son and daughter-in-law      Condition on discharge Stable    Patient ID   Mary Robles, 70 y. o.female /  1945  / MRN [id number]    Admit date   10/19/2021    Discharge date  10/28/2021  11:42 AM    Admission diagnoses Active Problems:    COVID    Acute respiratory failure with hypoxia ([address]    Pneumonia due to COVID-19 virus    Atrial fibrillation (HCC)    Hyperlipidemia    Acute on chronic combined systolic and diastolic congestive heart failure (HCC)    Hypovolemia  Resolved Problems:    * No resolved hospital problems. *    Discharge diagnoses Same    Consults   IP CONSULT TO HEART FAILURE NURSE/COORDINATOR  IP CONSULT TO DIETITIAN  IP CONSULT TO IV TEAM  IP CONSULT TO PHARMACY  IP CONSULT TO CARDIOLOGY  IP CONSULT TO HOME CARE NEEDS    Procedures   See hospital course    Hospital Course  75F PMH Afib on anticoagulation, anxiety, HPL, R breast Ca s/p chemo and radiation admitted 10/19 w SOB d/t COVID-19 pna. Also some likely LLE cellulitis, non-zero troponin, and anemia presumably due to GI bleed. Acute hypoxic resp failure d/t COVID pneumonia  · COVID+ as of 10/19  · Imaging noted  · Afebrile  · O2 requirement currently [facility]  ?  Chronically wears 2 at home  · Incentive spirometry and prone positioning as tolerated  · Inflammatory markers noted  · Decadron 6 mg daily to be stopped on dc  · Baricitinib deferred - pt chronically hypoxic and minimally symptomatic  · Remdesivir / toci not indicated  · ID and pulmonology consults deferred due to uncomplicated nature of this case  · Symptom mgmt for cough, fevers, nausea, diarrhea, body aches     NICM - BNP ~5k on admission, Bumex 1 mg IV bid initially now off all diuresis, per chart pt is down 9.1 L w pt switched to 1 mg PO daily by cardio    Afib - on Coumadin. Govind Vázquez.  Rate controlled, cardio note Aug 11 reviewed, pt is no longer on Tikosyn, on Coumadin w most recent INR 3 - will DC on this but cardio notes she is a \"poor long-term candidate for anticoagulation and should be reasonable candidate for watchman left atrial appendage closure device.  This should be considered as an outpatient. \"  Needs to follow closely w PCP / cardio regarding anticoagulation - INR ordered for Monday w results to be forwarded to both PCP and cardio     ?LE cellulitis - Started on Rocephin and doxy, ASO was only 61, stopped w no subsequent issues    Hallucinations, resolved - pt is appropriate day after admission. UA unremarkable     Weakness / falls at home - PT OT consulted, Mateo Jairo was 17 and SNF recommended though pt prefers home w family     Discharge Exam  /84   Pulse 81   Temp 97.8 °F (36.6 °C) (Infrared)   Resp 18   Ht 5' 6\" (1.676 m)   Wt 154 lb 4.8 oz (70 kg)   SpO2 100%   BMI 24.90 kg/m²   General Appearance: alert and oriented to person, place and time and in no acute distress  Skin: warm and dry  Head: normocephalic and atraumatic  Eyes: pupils equal, round, and reactive to light, extraocular eye movements intact, conjunctivae normal  Neck: neck supple and non tender without mass   Pulmonary/Chest: clear to auscultation bilaterally- no wheezes, rales or rhonchi, normal air movement, no respiratory distress  Cardiovascular: normal rate, normal S1 and S2 and no carotid bruits  Abdomen: soft, non-tender, non-distended, normal bowel sounds, no masses or organomegaly  Extremities: no cyanosis, no clubbing and no edema  Neurologic: no cranial nerve deficit and speech normal    I/O last 3 completed shifts:  In: -   Out: 1500 [Urine:1500]  No intake/output data recorded.     Labs  Recent Labs     10/26/21  0540 10/27/21  0645 10/28/21  0548    143 139   K 4.1 4.1 4.1   CL 94* 94* 92*   CO2 48* 45* 42*   BUN 28* 30* 31*   CREATININE 0.9 0.8 0.8   GLUCOSE 149* 140* 141*   CALCIUM 8.4* 8.7 8.4*       Imaging  No results found.   Patient Instructions     Medication List      START taking these medications    benzonatate 100 MG capsule  Commonly known as: TESSALON  Take 1 capsule by mouth 3 times daily as needed for Cough     metoprolol succinate 25 MG extended release tablet  Commonly known as: TOPROL XL  Take 1 tablet by mouth 2 times daily     warfarin 4 MG tablet  Commonly known as: Coumadin  Take 1 tablet by mouth daily        CHANGE how you take these medications    bumetanide 1 MG tablet  Commonly known as: BUMEX  Take 1 tablet by mouth daily Indications: May Take Addional Dose if Needed  What changed:   · when to take this  · reasons to take this        CONTINUE taking these medications    ALPRAZolam 0.25 MG tablet  Commonly known as: XANAX     atorvastatin 10 MG tablet  Commonly known as: LIPITOR     calcium carbonate 500 MG Tabs tablet  Commonly known as: OSCAL     dofetilide 250 MCG capsule  Commonly known as: TIKOSYN     ferrous sulfate 325 (65 Fe) MG tablet  Commonly known as: IRON 325     OXYGEN     pantoprazole 40 MG tablet  Commonly known as: PROTONIX  Take 1 tablet by mouth every morning (before breakfast)     PARoxetine 20 MG tablet  Commonly known as: PAXIL  Take 1 tablet by mouth every morning     potassium chloride 20 MEQ extended release tablet  Commonly known as: KLOR-CON M  Take 1 tablet by mouth daily     sucralfate 1 GM tablet  Commonly known as: CARAFATE  Take 1 tablet by mouth 3 times daily (with meals)        STOP taking these medications    metoprolol tartrate 25 MG tablet  Commonly known as: LOPRESSOR           Where to Get Your Medications      These medications were sent to [address] [place] [address] [phone]  [address] [facility] [place] [address]    Phone: [phone] · benzonatate 100 MG capsule  · metoprolol succinate 25 MG extended release tablet  · warfarin 4 MG tablet     Information about where to get these medications is not yet available    Ask your nurse or doctor about these medications  · bumetanide 1 MG tablet       Note that more than 30 minutes was spent in preparing discharge papers, discussing discharge with patient, medication review, etc.    Electronically signed by Christopher Nunes DO on 10/28/2021 at 11:42 AM

## 2021-10-28 NOTE — PLAN OF CARE
Pt, who is now stable for dc and who has declined [facility] placement previously, informed she is to be DC'd and now willing to go to [facility]. As it is Thursday it is doubtful she will have pre-cert before the weekend and will likely remain here until at least Monday. This will be thousands of extra dollars needlessly added to her care and several more days occupying a bed in this facility without medical necessity. Pt's discharge cancelled.       Electronically signed by Charmayne Milliner, DO on 10/28/2021 at 1:51 PM

## 2021-10-28 NOTE — PROGRESS NOTES
Occupational Therapy  OT BEDSIDE TREATMENT NOTE      Date:10/28/2021  Patient Name: Milagros Diaz  MRN: [id number]  : 1945  Room: [address] [place], [place] [address]     Evaluating OT: Evans Bloch OTR/L   [id number]       Referring Provider:Apollo Gaytan DO    Specific Provider Orders/Date:OT eval and treat 10/20/2021       Diagnosis:  Dyspnea and respiratory abnormalities [R06.00, R06.89]  Acute on chronic combined systolic and diastolic congestive heart failure ([facility] [I50.43]  COVID [U07.1]  Pneumonia due to COVID-19 virus [U07.1, J12.82]     Pertinent Medical History: A fib, osteoporosis,     Precautions:  Fall Risk, O2,  DROPLET PLUS       Assessment of current deficits    [x]? Functional mobility            [x]?ADLs           [x]? Strength                  []?Cognition    [x]? Functional transfers          [x]? IADLs         [x]? Safety Awareness   [x]? Endurance    []? Fine Coordination                         [x]? Balance      []? Vision/perception   []? Sensation      []? Gross Motor Coordination             []? ROM           []? Delirium                   []? Motor Control      OT PLAN OF CARE   OT POC based on physician orders, patient diagnosis and results of clinical assessment     Frequency/Duration  2-4 days/wk for 2 weeks PRN   Specific OT Treatment Interventions to include:   ADL retraining/adapted techniques and AE recommendations to increase functional independence within precautions                    Energy conservation techniques to improve tolerance for selfcare routine   Functional transfer/mobility training/DME recommendations for increased independence, safety and fall prevention         Patient/family education to increase safety and functional independence             Environmental modifications for safe mobility and completion of ADLs                             Therapeutic activity to improve functional performance during ADLs.                                          Therapeutic exercise to improve tolerance and functional strength for ADLs    Balance retraining/tolerance tasks for facilitation of postural control with dynamic challenges during ADLs .       Positioning to improve functional independence     Recommended Adaptive Equipment: TBD      Home Living: Pt lives with son and daughter in law.  1 story     Equipment owned: walker,home O2     Prior Level of Function: Independent  with ADLs , assist   with IADLs; ambulated with walker         Pain Level: no pain this session ;   Cognition: A&O: 4/4;               Memory:  Good               Sequencing:  Good               Problem solving:  Fair +              Judgement/safety:  Fair+                Functional Assessment:  AM-PAC Daily Activity Raw Score: 16/24    Initial Eval Status  Date: 10/21/21 Treatment Status  Date:10/28/2021 STGs = LTGs  Time frame: 10-14 days   Feeding Independent        Grooming SBA/set-up ,seated  SBA/set-up seated  Able to use UEs for tasks at seated   Decrease standing tolerance   Mod I    UB Dressing SBA/set-up    Mod I    LB Dressing Mod A  Mod A  Assist with adjusting socks   Mod I    Bathing Mod A    Mod I    Toileting Supervision    Mod I    Bed Mobility  Min A  Supine to sit  Min A  Supine to sit   Mod I    Functional Transfers Min A  Sit-stand from bed, commode   Min A  Sit-stand from bed  Mod I    Functional Mobility Min A,w/walker  Ambulated to/from bathroom   Min A,w/walker  Steps from bed to chair only     Patient fearful of falling and self limiting to short distance to chair only    Mod I  with good tolerance    Balance Sitting:     Static:  Independent     Dynamic:Min A  Standing: Min A   Mod I    Activity Tolerance Fair with light activity   no complaints of SOB during session- patient on room upon entering room    Difficult to obtain O2 sats with pulse ox - patient's fingertips blueish - patient states that is how they are  Good  with ADL activity    Visual/  Perceptual Glasses: reading                        Hand Dominance right     AROM (PROM) Strength Additional Info:    RUE  WFL WFL good  and wfl FMC/dexterity noted during ADL tasks         LUE WFL WFL good  and wfl FMC/dexterity noted during ADL tasks     UE AROM WFLS for ADL activity     Education: safety awareness, importance of OOB activity     Comments: Upon arrival pt lying in bed . At end of session sitting in chair  all lines and tubes intact, call light within reach. ALARM ON       · Pt has made fair  progress towards set goals.      Time in: 1600  Time out:1615                Treatment Charges: Mins Units   Ther Ex  [id number]     Manual Therapy Delisa Cordoba [phone] [id number]   ADL/Home Mgt [id number]     Neuro Re-ed [id number]     Group Therapy      Orthotic manage/training  [id number]     Non-Billable Time     Total Timed Treatment            Liya Goode OTR/L [id number]

## 2021-10-28 NOTE — DISCHARGE INSTR - COC
Continuity of Care Form    Patient Name: Shira Hart   :  1945  MRN:  [id number]    Admit date:  10/19/2021  Discharge date:10/29/2021  ***    Code Status Order: Full Code   Advance Directives:     Admitting Physician:  Laith Leon MD  PCP: Giselle Venegas DO    Discharging Nurse: [address] Unit/Room#: 1871/4250-Y  Discharging Unit Phone Number: [phone]    Emergency Contact:   Extended Emergency Contact Information  Primary Emergency Contact: Jesse Seymour  Address: [address] [facility], [address] of [address] [employer] Phone: [phone]  Mobile Phone: [phone]  Relation: Child    Past Surgical History:  Past Surgical History:   Procedure Laterality Date    BRONCHOSCOPY  2012    ebus    CARDIAC CATHETERIZATION Right 2014    Dr. Kourtney Allen  2016    CARDIOVERSION  2021    Successful    (Dr. German Esqueda)    COLONOSCOPY N/A 3/1/2021    COLONOSCOPY DIAGNOSTIC performed by Enrique Ham MD at [address]      RT - 2010    MASTECTOMY Right 2009    TONSILLECTOMY      TUNNELED VENOUS PORT PLACEMENT      UPPER GASTROINTESTINAL ENDOSCOPY N/A 2021    EGD ESOPHAGOGASTRODUODENOSCOPY performed by Enrique Ham MD at [address] N/A 3/1/2021    EGD ESOPHAGOGASTRODUODENOSCOPY performed by Enrique Ham MD at [address]       Immunization History:   Immunization History   Administered Date(s) Administered    Influenza Vaccine, unspecified formulation 2017    Influenza Virus Vaccine 10/31/2011, 10/23/2013, 10/01/2014, 10/19/2015    Influenza, High Dose (Fluzone 65 yrs and older) 2017    Influenza, Quadv, adjuvanted, 65 yrs +, IM, PF (Fluad) 09/15/2020    Pneumococcal Conjugate 13-valent (Rcctcqq63) 08/10/2015    Pneumococcal Polysaccharide (Aspzeriwg82) 2014, 09/15/2020    Zoster Recombinant (Shingrix) 11/14/2020       Active Problems:  Patient Active Problem List   Diagnosis Code    Adenopathy R59.9    Malignant neoplasm of breast ([facility] [address] C50.919    Atrial fibrillation with RVR ([facility] [address] I48.91    Dyspnea on exertion R06.00    Chronic anticoagulation Z79.01    Iron deficiency anemia D50.9    Breast cancer metastasized to axillary lymph node (HCC) C50.919, C77.3    Atrial flutter (HCC) I48.92    Bilateral pleural effusion J90    Syncope and collapse R55    Breast cancer metastasized to axillary lymph node, right (HCC) C50.911, C77.3    Atrial tachycardia (HCC) I47.1    Rectal bleeding K62.5    GI bleed K92.2    COVID U07.1    Acute respiratory failure with hypoxia (HCC) J96.01    Pneumonia due to COVID-19 virus U07.1, J12.82    Atrial fibrillation (HCC) I48.91    Hyperlipidemia E78.5    Acute on chronic combined systolic and diastolic congestive heart failure (HCC) I50.43    Hypovolemia E86.1       Isolation/Infection:   Isolation          Droplet  Droplet Plus        Patient Infection Status     Infection Onset Added Last Indicated Last Indicated By Review Planned Expiration Resolved Resolved By    COVID-19 10/19/21 10/19/21 10/19/21 COVID-19, Rapid 10/26/21 11/02/21      Resolved    COVID-19 Rule Out 10/19/21 10/19/21 10/19/21 COVID-19, Rapid (Ordered)   10/19/21 Rule-Out Test Resulted    COVID-19 Rule Out 01/08/21 01/08/21 01/08/21 COVID-19 Ambulatory (Ordered)   01/09/21 Rule-Out Test Resulted          Nurse Assessment:  Last Vital Signs: /84   Pulse 81   Temp 97.8 °F (36.6 °C) (Infrared)   Resp 18   Ht 5' 6\" (1.676 m)   Wt 154 lb 4.8 oz (70 kg)   SpO2 100%   BMI 24.90 kg/m²     Last documented pain score (0-10 scale): Pain Level: 0  Last Weight:   Wt Readings from Last 1 Encounters:   10/28/21 154 lb 4.8 oz (70 kg)     Mental Status:  oriented    IV Access:  - None    Nursing Mobility/ADLs:  Walking   Dependent  Transfer  Dependent  Bathing  Dependent  Dressing Dependent  Toileting  Dependent  Feeding  Assisted  Med Admin  Independent  Med Delivery   whole    Wound Care Documentation and Therapy:        Elimination:  Continence:   · Bowel: Yes  · Bladder: No  Urinary Catheter: None   Colostomy/Ileostomy/Ileal Conduit: No       Date of Last BM: 10/28/2021    Intake/Output Summary (Last 24 hours) at 10/28/2021 1450  Last data filed at 10/28/2021 0639  Gross per 24 hour   Intake --   Output 1500 ml   Net -1500 ml     I/O last 3 completed shifts:  In: -   Out: 1500 [Urine:1500]    Safety Concerns: At Risk for Falls    Impairments/Disabilities:      Vision    Nutrition Therapy:  Current Nutrition Therapy:   - Oral Diet:  Low Sodium (2gm)    Routes of Feeding: Oral  Liquids: Thin Liquids  Daily Fluid Restriction: no  Last Modified Barium Swallow with Video (Video Swallowing Test): not done    Treatments at the Time of Hospital Discharge:   Respiratory Treatments: ***  Oxygen Therapy:  is on oxygen at 2 L/min per nasal cannula.   Ventilator:    - No ventilator support    Rehab Therapies: Physical Therapy  Weight Bearing Status/Restrictions: No weight bearing restirctions  Other Medical Equipment (for information only, NOT a DME order):  walker  Other Treatments: ***    Patient's personal belongings (please select all that are sent with patient):  None    RN SIGNATURE:  {Esignature:[id number]}    CASE MANAGEMENT/SOCIAL WORK SECTION    Inpatient Status Date: ***    Readmission Risk Assessment Score:  Readmission Risk              Risk of Unplanned Readmission:  31           Discharging to Facility/ Agency   · Name: [facility]   · [address] [place] [id number]  · [facility][phone]  · ZJP:[phone]    Dialysis Facility (if applicable)   · Name:  · Address:  · Dialysis Schedule:  · Phone:  · Fax:    / signature: Electronically signed by Charmayne Shirts, LSW on 10/28/2021 at 2:51 PM      PHYSICIAN SECTION    Prognosis: {Prognosis:2518194480}    Condition at Discharge: Danelle Mcfarland Patient Condition:[id number]}    Rehab Potential (if transferring to Rehab): {Prognosis:6829193676}    Recommended Labs or Other Treatments After Discharge: ***    Physician Certification: I certify the above information and transfer of Milagros Diaz  is necessary for the continuing treatment of the diagnosis listed and that she requires skilled snf for {LESS:541279661} 30 days.      Update Admission H&P: {CHP DME Changes in KKWA:364120536}    PHYSICIAN SIGNATURE:  {Esignature:[id number]}

## 2021-10-28 NOTE — CARE COORDINATION
10/28/2021  Social Work Discharge Planning:COVID POS 10/19. [facility] 17/24. [address] is following. [facility] order is in. Pt is now on room air. Pt uses 2l Yasmin Fess. Plan is home with son and dil. Electronically signed by JIMENEZ Watson on 10/28/2021 at 9:51 AM    10/28/2021. Social Work Discharge Planning:SW was informed that Pt now wants to got a EMERSON. Due to having COVID only two facilities are possibly able to accept Pt. SW made a referral to [place] [address].. Waiting reply. If they accept, Pt will be on a wait list. [facility] also has a wait list. Electronically signed by JIMENEZ Watson on 10/28/2021 at 1:47 PM    10/28/2021  Social Work Discharge Planning:[facility] [address] accepts Pt possibly tomorrow. They will let SW know after therapy eval updates are in. They will also inform SW if we need to wait for precert auth. N-17 generated and transport form is in chart. HENS WILL BE NEEDED.  Electronically signed by JIMENEZ Watson on 10/28/2021 at 2:49 PM

## 2021-10-29 VITALS
HEIGHT: 66 IN | BODY MASS INDEX: 24.8 KG/M2 | HEART RATE: 81 BPM | SYSTOLIC BLOOD PRESSURE: 107 MMHG | WEIGHT: 154.3 LBS | DIASTOLIC BLOOD PRESSURE: 66 MMHG | RESPIRATION RATE: 18 BRPM | TEMPERATURE: 97.8 F | OXYGEN SATURATION: 99 %

## 2021-10-29 LAB
ALBUMIN SERPL-MCNC: 2.5 G/DL (ref 3.5–5.2)
ALP BLD-CCNC: 95 U/L (ref 35–104)
ALT SERPL-CCNC: 26 U/L (ref 0–32)
ANION GAP SERPL CALCULATED.3IONS-SCNC: 6 MMOL/L (ref 7–16)
AST SERPL-CCNC: 25 U/L (ref 0–31)
BILIRUB SERPL-MCNC: 1.2 MG/DL (ref 0–1.2)
BUN BLDV-MCNC: 29 MG/DL (ref 6–23)
CALCIUM SERPL-MCNC: 8.1 MG/DL (ref 8.6–10.2)
CHLORIDE BLD-SCNC: 96 MMOL/L (ref 98–107)
CO2: 38 MMOL/L (ref 22–29)
CREAT SERPL-MCNC: 0.8 MG/DL (ref 0.5–1)
GFR AFRICAN AMERICAN: >60
GFR NON-AFRICAN AMERICAN: >60 ML/MIN/1.73
GLUCOSE BLD-MCNC: 132 MG/DL (ref 74–99)
INR BLD: 3
POTASSIUM SERPL-SCNC: 4.1 MMOL/L (ref 3.5–5)
PROTHROMBIN TIME: 32.5 SEC (ref 9.3–12.4)
REASON FOR REJECTION: NORMAL
REASON FOR REJECTION: NORMAL
REJECTED TEST: NORMAL
REJECTED TEST: NORMAL
SODIUM BLD-SCNC: 140 MMOL/L (ref 132–146)
TOTAL PROTEIN: 4.9 G/DL (ref 6.4–8.3)

## 2021-10-29 PROCEDURE — 99239 HOSP IP/OBS DSCHRG MGMT >30: CPT | Performed by: INTERNAL MEDICINE

## 2021-10-29 PROCEDURE — 6370000000 HC RX 637 (ALT 250 FOR IP): Performed by: NURSE PRACTITIONER

## 2021-10-29 PROCEDURE — 80053 COMPREHEN METABOLIC PANEL: CPT

## 2021-10-29 PROCEDURE — 6370000000 HC RX 637 (ALT 250 FOR IP): Performed by: INTERNAL MEDICINE

## 2021-10-29 PROCEDURE — 2700000000 HC OXYGEN THERAPY PER DAY

## 2021-10-29 PROCEDURE — 6360000002 HC RX W HCPCS: Performed by: INTERNAL MEDICINE

## 2021-10-29 PROCEDURE — 85610 PROTHROMBIN TIME: CPT

## 2021-10-29 PROCEDURE — 36415 COLL VENOUS BLD VENIPUNCTURE: CPT

## 2021-10-29 PROCEDURE — 2580000003 HC RX 258: Performed by: INTERNAL MEDICINE

## 2021-10-29 RX ORDER — WARFARIN SODIUM 2.5 MG/1
2.5 TABLET ORAL
Status: DISCONTINUED | OUTPATIENT
Start: 2021-10-29 | End: 2021-10-29 | Stop reason: HOSPADM

## 2021-10-29 RX ORDER — HEPARIN SODIUM (PORCINE) LOCK FLUSH IV SOLN 100 UNIT/ML 100 UNIT/ML
500 SOLUTION INTRAVENOUS PRN
Status: DISCONTINUED | OUTPATIENT
Start: 2021-10-29 | End: 2021-10-29 | Stop reason: HOSPADM

## 2021-10-29 RX ADMIN — Medication 1000 MG: at 08:18

## 2021-10-29 RX ADMIN — SUCRALFATE 1 G: 1 TABLET ORAL at 08:19

## 2021-10-29 RX ADMIN — PANTOPRAZOLE SODIUM 40 MG: 40 TABLET, DELAYED RELEASE ORAL at 06:01

## 2021-10-29 RX ADMIN — SUCRALFATE 1 G: 1 TABLET ORAL at 11:43

## 2021-10-29 RX ADMIN — Medication 10 ML: at 08:20

## 2021-10-29 RX ADMIN — FERROUS SULFATE TAB 325 MG (65 MG ELEMENTAL FE) 325 MG: 325 (65 FE) TAB at 08:19

## 2021-10-29 RX ADMIN — METOPROLOL SUCCINATE 25 MG: 25 TABLET, EXTENDED RELEASE ORAL at 08:18

## 2021-10-29 RX ADMIN — PAROXETINE HYDROCHLORIDE 20 MG: 20 TABLET, FILM COATED ORAL at 08:19

## 2021-10-29 RX ADMIN — Medication 2000 UNITS: at 08:18

## 2021-10-29 RX ADMIN — ZINC SULFATE 220 MG (50 MG) CAPSULE 50 MG: CAPSULE at 08:19

## 2021-10-29 RX ADMIN — ACETAMINOPHEN 650 MG: 325 TABLET ORAL at 14:32

## 2021-10-29 RX ADMIN — Medication 500 UNITS: at 14:11

## 2021-10-29 ASSESSMENT — PAIN SCALES - GENERAL
PAINLEVEL_OUTOF10: 4
PAINLEVEL_OUTOF10: 0

## 2021-10-29 ASSESSMENT — PAIN DESCRIPTION - DESCRIPTORS: DESCRIPTORS: ACHING

## 2021-10-29 ASSESSMENT — PAIN DESCRIPTION - PAIN TYPE: TYPE: ACUTE PAIN

## 2021-10-29 ASSESSMENT — PAIN DESCRIPTION - LOCATION: LOCATION: HEAD

## 2021-10-29 NOTE — PROGRESS NOTES
Pharmacy Consultation Note  (Anticoagulant Dosing and Monitoring)    Initial consult date: 10/23/2021  Consulting physician: Dr. Zoya Klein    Allergies:  Sulfa antibiotics      Ht Readings from Last 1 Encounters:   10/19/21 5' 6\" (1.676 m)     Wt Readings from Last 1 Encounters:   10/28/21 154 lb 4.8 oz (70 kg)       Warfarin Indication Target   INR [place] Dose  (if applicable) Diet/Feeding Tube   Atrial fibrillation 2-3 Warfarin 5 mg daily  --       Vitamin K or Blood product  Administration Date                 Warfarin drug-drug interactions  Start  Stop Home Med?  Comments                                 TSH:    Lab Results   Component Value Date    TSH 1.180 10/19/2021        Hepatic Function Panel:                         Lab Results   Component Value Date    ALKPHOS 95 10/29/2021    ALT 26 10/29/2021    AST 25 10/29/2021    PROT 4.9 10/29/2021    BILITOT 1.2 10/29/2021    BILIDIR 1.4 10/19/2021    IBILI 1.1 10/19/2021    LABALBU 2.5 10/29/2021    LABALBU 4.1 02/20/2012       Date Warfarin Dose INR Heparin or LMWH HBG/  HCT PLT Comment   10/23 5 mg  1.3 -- -- --    10/24 5 mg  1.3 -- -- --    10/25 5 mg 1.6 -- 15.2/50.6 188    10/26 1 mg 2.7 -- -- --    10/27 2.5 mg 2.9 -- -- --    10/28 2.5 mg 3 -- -- --    10/29 2.5 mg 3 -- -- --               Assessment:  · Patient is a 70year old female on warfarin for atrial fibrillation  · INR goal 2-3; home warfarin dose warfarin 5 mg daily   · INR today = 3 (therapeutic, unchanged from yesterday)    Plan:  · Will give warfarin 2.5 mg again tonight   · Daily PT/INR until the INR is stable within the therapeutic range  · Pharmacist will follow and monitor/adjust dosing as necessary    Sheran Skiff, BeatriceD, [facility]  10/29/2021  10:58 AM

## 2021-10-29 NOTE — CARE COORDINATION
10/29/2021  Social Work Discharge Planning:SW was informed that [address] [place] [address] is able to take Pt today. No need to wait for auth. N-17 generated, hens completed and  transport form is in chart. Sw notified nurse. Electronically signed by JIMENEZ Herron on 10/29/2021 at 11:37 AM    10/29/2021 SW set up ambulette transport via [facility] for Pt to go to [address] at 3:30 today. Nurse here , liaison at [place] [address] and son Duarte Miller were notified. Electronically signed by JIMENEZ Herron on 10/29/2021 at 12:21 PM

## 2021-10-29 NOTE — PROGRESS NOTES
Dr. Regla Cristobal aware [facility] He will discharge patient.

## 2021-10-29 NOTE — DISCHARGE SUMMARY
[facility] Physician Discharge Summary     Laya Miranda MD  [facility] [address]  [phone]    In 3 weeks      [address]  [phone]        Activity level   As tolerated    Disposition   Home with son and daughter-in-law      Condition on discharge Stable    Patient ID   Kathryn Orosco, 70 y. o.female /  1945  / MRN [id number]    Admit date   10/19/2021    Discharge date  10/29/2021  2:27 PM    Admission diagnoses Active Problems:    COVID    Acute respiratory failure with hypoxia ([facility] [address] [address]    Pneumonia due to COVID-19 virus    Atrial fibrillation (HCC)    Hyperlipidemia    Acute on chronic combined systolic and diastolic congestive heart failure (HCC)    Hypovolemia  Resolved Problems:    * No resolved hospital problems. *    Discharge diagnoses Same    Consults   IP CONSULT TO HEART FAILURE NURSE/COORDINATOR  IP CONSULT TO DIETITIAN  IP CONSULT TO IV TEAM  IP CONSULT TO PHARMACY  IP CONSULT TO CARDIOLOGY  IP CONSULT TO HOME CARE NEEDS    Procedures   See hospital course    Hospital Course  75F PMH Afib on anticoagulation, anxiety, HPL, R breast Ca s/p chemo and radiation admitted 10/19 w SOB d/t COVID-19 pna. Also some likely LLE cellulitis, non-zero troponin, and anemia presumably due to GI bleed. Acute hypoxic resp failure d/t COVID pneumonia  · COVID+ as of 10/19  · Imaging noted  · Afebrile  · O2 requirement currently [facility]  ?  Chronically wears 2 at home  · Incentive spirometry and prone positioning as tolerated  · Inflammatory markers noted  · Decadron 6 mg daily to be stopped on dc  · Baricitinib deferred - pt chronically hypoxic and minimally symptomatic  · Remdesivir / toci not indicated  · ID and pulmonology consults deferred due to uncomplicated nature of this case  · Symptom mgmt for cough, fevers, nausea, diarrhea, body aches     NICM - BNP ~5k on admission, Bumex 1 mg IV bid initially now off all diuresis, per chart pt is down 9.1 L w pt switched to 1 mg PO daily by cardio    Afib - on Coumadin. Kika Robbins w Dr. Morgan An.  Rate controlled, cardio note Aug 11 reviewed, pt is no longer on Tikosyn, on Coumadin w most recent INR 3 - will DC on this but cardio notes she is a \"poor long-term candidate for anticoagulation and should be reasonable candidate for watchman left atrial appendage closure device.  This should be considered as an outpatient. \"  Needs to follow closely w PCP / cardio regarding anticoagulation - INR ordered for Monday w results to be forwarded to both PCP and cardio     ?LE cellulitis - Started on Rocephin and doxy, ASO was only 61, stopped w no subsequent issues    Hallucinations, resolved - pt is appropriate day after admission.   UA unremarkable     Weakness / falls at home - PT OT consulted, Dorie Crisostomo was 17 and SNF recommended though pt prefers home w family --- pt was dc'd yesterday and after order placed she changed her mind and wanted SNF to which she has now been accepted    Discharge Exam  /66   Pulse 81   Temp 97.8 °F (36.6 °C) (Infrared)   Resp 18   Ht 5' 6\" (1.676 m)   Wt 154 lb 4.8 oz (70 kg)   SpO2 99%   BMI 24.90 kg/m²   General Appearance: alert and oriented to person, place and time and in no acute distress  Skin: warm and dry  Head: normocephalic and atraumatic  Eyes: pupils equal, round, and reactive to light, extraocular eye movements intact, conjunctivae normal  Neck: neck supple and non tender without mass   Pulmonary/Chest: clear to auscultation bilaterally- no wheezes, rales or rhonchi, normal air movement, no respiratory distress  Cardiovascular: normal rate, normal S1 and S2 and no carotid bruits  Abdomen: soft, non-tender, non-distended, normal bowel sounds, no masses or organomegaly  Extremities: no cyanosis, no clubbing and no edema  Neurologic: no cranial nerve deficit and speech normal    I/O last 3 completed shifts:  In: -   Out: 400 [Urine:400]  No intake/output data recorded. Labs  Recent Labs     10/27/21  0645 10/28/21  0548 10/29/21  0601    139 140   K 4.1 4.1 4.1   CL 94* 92* 96*   CO2 45* 42* 38*   BUN 30* 31* 29*   CREATININE 0.8 0.8 0.8   GLUCOSE 140* 141* 132*   CALCIUM 8.7 8.4* 8.1*       Imaging  No results found.   Patient Instructions     Medication List      START taking these medications    benzonatate 100 MG capsule  Commonly known as: TESSALON  Take 1 capsule by mouth 3 times daily as needed for Cough     metoprolol succinate 25 MG extended release tablet  Commonly known as: TOPROL XL  Take 1 tablet by mouth 2 times daily     warfarin 4 MG tablet  Commonly known as: Coumadin  Take 1 tablet by mouth daily        CHANGE how you take these medications    bumetanide 1 MG tablet  Commonly known as: BUMEX  Take 1 tablet by mouth daily Indications: May Take Addional Dose if Needed  What changed:   · when to take this  · reasons to take this        CONTINUE taking these medications    ALPRAZolam 0.25 MG tablet  Commonly known as: XANAX     atorvastatin 10 MG tablet  Commonly known as: LIPITOR     calcium carbonate 500 MG Tabs tablet  Commonly known as: OSCAL     dofetilide 250 MCG capsule  Commonly known as: TIKOSYN     ferrous sulfate 325 (65 Fe) MG tablet  Commonly known as: IRON 325     OXYGEN     pantoprazole 40 MG tablet  Commonly known as: PROTONIX  Take 1 tablet by mouth every morning (before breakfast)     PARoxetine 20 MG tablet  Commonly known as: PAXIL  Take 1 tablet by mouth every morning     potassium chloride 20 MEQ extended release tablet  Commonly known as: KLOR-CON M  Take 1 tablet by mouth daily     sucralfate 1 GM tablet  Commonly known as: CARAFATE  Take 1 tablet by mouth 3 times daily (with meals)        STOP taking these medications    metoprolol tartrate 25 MG tablet  Commonly known as: LOPRESSOR           Where to Get Your Medications      These medications were sent to [facility], [address] [place], [place] [address], [address] [facility] [address]    Phone: [phone]   · benzonatate 100 MG capsule  · metoprolol succinate 25 MG extended release tablet  · warfarin 4 MG tablet     Information about where to get these medications is not yet available    Ask your nurse or doctor about these medications  · bumetanide 1 MG tablet       Note that more than 30 minutes was spent in preparing discharge papers, discussing discharge with patient, medication review, etc.    Electronically signed by France Rich DO on 10/29/2021 at 2:27 PM

## 2021-10-30 NOTE — PROGRESS NOTES
Physician Progress Note      PATIENTJurline Taj  CSN #:                  [id number]  :                       1945  ADMIT DATE:       10/19/2021 5:09 PM  [address] [place] DATE:        10/29/2021 4:59 PM  RESPONDING  PROVIDER #:        Caro Harada LOCKSO DO          QUERY TEXT:    Patient admitted with shortness of breath. Noted documentation of acute   respiratory failure in H&P and 10/20 IM progress note. In order to support the   diagnosis of acute respiratory failure, please include additional clinical   indicators in your documentation. Or please document if the diagnosis of acute   respiratory failure has been ruled out after further study. The medical record reflects the following:  Risk Factors: COVID pneumonia  Clinical Indicators: 92% 2L, resp 16-28, wheezing yet unlabored breathing per   nursing, per H&P \". ..:Acute respiratory failure with hypoxia. [place] \", per 10/20 IM   \". [place] Acute hypoxic resp failure d/t COVID pneumonia. [place] \"  Treatment: O2 therapy    Acute Respiratory Failure Clinical Indicators per [employer] Training Guide and   Quick Reference Guide:  pO2 < 60 mmHg or SpO2 (pulse oximetry) < 91% breathing room air  pCO2 > 50 and pH < 7.35  P/F ratio (pO2 / FIO2) < 300  pO2 decrease or pCO2 increase by 10 mmHg from baseline (if known)  Supplemental oxygen of 40% or more  Presence of respiratory distress, shortness of breath, wheezing  Unable to speak in complete sentences  Use of accessory muscles to breathe  Extreme anxiety and feeling of impending doom  Tripod position  Confusion/altered mental status/obtunded    Thank you,  Adelina Cobos, RN, BSN, CDIS  Clinical Documentation Improvement  [email or web address]. [employer]  Options provided:  -- Acute Respiratory Failure as evidenced by, Please document evidence.   -- Acute Respiratory Failure ruled out after study  -- Other - I will add my own diagnosis  -- Disagree - Not applicable / Not valid  -- Disagree - Clinically unable to determine / Unknown  -- Refer to Clinical Documentation Reviewer    PROVIDER RESPONSE TEXT:    Acute Respiratory Failure has been ruled out after study.     Query created by: Jessie Lawson on 10/29/2021 10:01 AM      Electronically signed by:  Tanya Blair DO 10/30/2021 4:26 PM

## 2022-01-12 NOTE — CONSULTS
Inpatient Cardiology Consultation      Reason for Consult:  Admitted with GI Bleed / Anticoagulation recommendations     Consulting Physician: Dr. Riya Bates    Requesting Physician:  Dr. Akil York    Date of Consultation: 2/28/2021    HISTORY OF PRESENT ILLNESS:   Ms. Pablo Luis is a 70year old female who is known to Dr. Bess Barajas (last evaluated in 8/8/2019 for history of syncope / Orthostatic hypotension 5/2019 in the setting of nausea), NICM (tachycardia induced CM component) with improved LVEF (60-65% on TTE 1/2019), normal coronaries on cath (2014) and Mod TR/PHTN. Patient follows with EP (Dr. Christy Jo) --> seen in virtual visit Gorge ZIEGLER (2/16/2021) --> Toprol decreased to 12.5 mg QD due to baseline bradycardia. Continued on Xarelto --> briefly held due to Upper GIB /EGD (2/9/2021) and then resumed on discharge. [facility] on 2/26/2021 with complaints of 3-4 episodes of hematochezia x 1 week. She recently underwent EGD 2/9/2021 showing hemorrhagic gastritis with no active bleeding with plan for PPI --> Xarelto was resumed 2/14/2021 when bleeding at stopped. She now complains of fatigue, dizziness and lightheadedness. She chronically feels intermittent shortness of breath with ambulation, no worse than normal.  Denies chest pain and palpitations. Patient was taking Bumex 1 mg p.o. daily denies any lower extremity edema. Denies orthopnea, PND, early CAD, or abdominal bloating. Upon arrival to the ED: Blood pressure 82/50 (treated with IVF), heart rate 105, afebrile, 100% on 2 L nasal cannula. Labs: Sodium 139, potassium 3.4, BUN 27, creatinine 1.1, proBNP 2977 (prior proBNP 1890), troponin negative x1, albumin 3.0, WBC 7.1, H/H 4.3/15.7 s/p 2U PRBC 6.7 s/p 1 U PRBC>> repeat hemoglobin 8.1 + PPI, platelet count 988. INR 2.7.  UA: small blood. Patient was admitted to ICU. General surgery: planning for EGD/Colonoscopy on 2/29/2021. Patient was transferred out of ICU 2/27/2021.   Hemoglobin 8.1.   Cardiology was consulted for further recommendations of [address] [place] [address]. Please note: past medical records were reviewed per electronic medical record (EMR) - see detailed reports under Past Medical/ Surgical History. Past Medical History:    1. Persistent atrial fibrillation with RVR:   · history of prior sotalol AAD therapy with ERAF. · Xarelto [address] [place] [address]. · [id number] score at least 2.  · s/p CV on 9/19/16  · Tikosyn initiation 11/14/16  · s/p CV 11/16/16  · recurrent PAF 1/16/19 in setting of acute decompensated HF- spontaneous conversion to SR  · 1/7/2021: presents in AT with variable conduction ( bpm)  · Toprol XL 25 mg QD to aid in HR control (monitor HRs)  · successful CV of atrial tachycardia to sinus rhythm on 1/14/2021  · 2/16/2021: Decrease Toprol XL to 12.5 mg QD  2. Hx of mild NICMP/Probable tachycardia-mediated CMP:   ·  EF 40-45% originally (2014)  · Cardiac catheterization, 04/03/2014. Normal coronary arteries. Global LV hypokinesis, EF 40%. 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 · On Sotalol(B-blockers held secondary to Sinus bradycardia), ACE-I.  · Sotalol d/c'd - Toprol XL resumed  · TTE: (May 2014): EF 55%. · TTE: (6/24/15): EF >55%, LVPW(d) 1 cm and IVS(d) 1 cm. · TTE: 1/16/19: LVEF 50-55%; LVPWd 1.3 cm; IVSd 1.3 cm.   3. Breast Ca s/p R mastectomy/CTX--2009. · Left mediport site remains. 4. UGI bleed  · 2/9/2021 EGD: hemorrhagic gastritis with no active bleeding with plan for PPI  · resumption of OAC 2/14/2021  · no recurrent bleeding   5. Baseline Sinus bradycardia    6. R sided breast CA (chronic R arm lymphedema, hx chemo)   7. Iron deficiency anemia (prn parenteral IV iron as per heme/onc team' Hgb 13.1 on 1/30/19). 8. Anxiety / Depression    9. Osteoarthritis    10. HTN   11. HLD   12. Lifelong nonsmoker   13. No prior history of CKD, CVA or DM. Medications Prior to admit:  Prior to Admission medications    Medication Sig Start Date End Date Taking? [de-identified] : Wound clean and intact without any swelling, tenderness, erythema or discharge. Authorizing Provider   bumetanide (BUMEX) 1 MG tablet Take 1 mg by mouth as needed Indications: May Take Addional Dose if Needed   Yes Historical Provider, MD   Influenza Vac High-Dose Quad (FLUZONE) 0.7 ML ADELA injection Inject 0.7 mLs into the muscle once *GIVEN AT PCP*   Yes Historical Provider, MD   zoster recombinant adjuvanted vaccine (SHINGRIX) 50 MCG/0.5ML SUSR injection Inject 0.5 mLs into the muscle once *GIVEN AT [address] [place] [address]*   Yes Historical Provider, MD   OXYGEN Inhale 2 L into the lungs nightly   Yes Historical Provider, MD   potassium chloride (KLOR-CON M) 20 MEQ extended release tablet Take 1 tablet by mouth daily 2/11/21  Yes Neeta Farrell MD   pantoprazole (PROTONIX) 40 MG tablet Take 1 tablet by mouth every morning (before breakfast) 2/10/21  Yes Suzanne Arellano MD   rivaroxaban (XARELTO) 20 MG TABS tablet Take 1 tablet by mouth Daily with supper 6/2/20  Yes Jany Harris DO   dofetilide (TIKOSYN) 250 MCG capsule Take 1 capsule by mouth every 12 hours 6/2/20  Yes Jany Harris DO   bumetanide (BUMEX) 1 MG tablet Take 1 tablet by mouth daily 2/22/19  Yes GIOVANNI Crowe   lisinopril (PRINIVIL;ZESTRIL) 5 MG tablet Take 1 tablet by mouth daily 1/19/19  Yes Oneyda Mayo DO   PARoxetine (PAXIL) 20 MG tablet Take 1 tablet by mouth every morning 10/4/17  Yes Kyleigh Diaz MD   atorvastatin (LIPITOR) 10 MG tablet Take 10 mg by mouth nightly    Yes Historical Provider, MD   ALPRAZolam (XANAX) 0.25 MG tablet Take 0.25 mg by mouth daily as needed for Anxiety.   3/24/16  Yes Historical Provider, MD   calcium carbonate (OSCAL) 500 MG TABS tablet Take 500 mg by mouth every morning    Yes Historical Provider, MD       Current Medications:    Current Facility-Administered Medications: ALPRAZolam (XANAX) tablet 0.25 mg, 0.25 mg, Oral, Daily PRN  atorvastatin (LIPITOR) tablet 10 mg, 10 mg, Oral, Nightly  dofetilide (TIKOSYN) capsule 250 mcg, 250 mcg, Oral, 2 times per day  PARoxetine (PAXIL) tablet 20 mg, 20 mg, Oral, QAM  sodium chloride flush 0.9 % injection 10 mL, 10 mL, Intravenous, 2 times per day  sodium chloride flush 0.9 % injection 10 mL, 10 mL, Intravenous, PRN  acetaminophen (TYLENOL) tablet 650 mg, 650 mg, Oral, Q6H PRN **OR** acetaminophen (TYLENOL) suppository 650 mg, 650 mg, Rectal, Q6H PRN  [START ON 2/28/2021] magnesium citrate solution 600 mL, 600 mL, Oral, Once  [START ON 2/28/2021] bisacodyl (DULCOLAX) EC tablet 10 mg, 10 mg, Oral, Once  pantoprazole (PROTONIX) injection 40 mg, 40 mg, Intravenous, BID **AND** sodium chloride (PF) 0.9 % injection 10 mL, 10 mL, Intravenous, BID    Allergies:  Sulfa antibiotics    Social History:    Lifelong nonsmoker  Denies alcohol and illicit drug use    Family History: Noncontributory due to advanced age. REVIEW OF SYSTEMS:     · Constitutional: + Fatigue. Denies fevers, chills or night sweats  · Eyes: Denies visual changes or drainage  · ENT: Denies headaches or hearing loss. No mouth sores or sore throat. No epistaxis   · Cardiovascular: Denies chest pain, pressure or palpitations. No lower extremity swelling. · Respiratory: + Chronic LEVIN, denies cough, orthopnea and PND. No hemoptysis   · Gastrointestinal: See HPI. · Genitourinary: Denies urgency, dysuria or hematuria. · Musculoskeletal: Denies gait disturbance, weakness or joint complaints  · Integumentary: Denies rash, hives or pruritis   · Neurological: Denies dizziness, headaches or seizures. No numbness or tingling  · Psychiatric: Denies anxiety or depression. · Endocrine: Denies temperature intolerance. No recent weight change. .  · Hematologic/Lymphatic: Denies abnormal bruising or bleeding. No swollen lymph nodes    PHYSICAL EXAM:   /72   Pulse 85   Temp 98 °F (36.7 °C) (Oral)   Resp (!) 40   Ht 5' 6\" (1.676 m)   Wt 163 lb 2.3 oz (74 kg)   SpO2 98%   BMI 26.33 kg/m²   CONST:  Well developed, well nourished elderly  female who appears of stated age.  Awake, alert and cooperative. No apparent distress. HEENT:   Head- Normocephalic, atraumatic   Eyes- Conjunctivae pink, anicteric  Throat- Oral mucosa pink and moist  Neck-  No stridor, trachea midline, no jugular venous distention. No carotid bruit. CHEST: Chest symmetrical and non-tender to palpation. No accessory muscle use or intercostal retractions  RESPIRATORY: Lung sounds - clear throughout fields. On room air  CARDIOVASCULAR:     Heart Inspection- shows no noted pulsations  Heart Palpation- no heaves or thrills; PMI is non-displaced   Heart Ausculation-IRR, no murmur. No s3 or rub   PV: No lower extremity edema. No varicosities. Pedal pulses palpable, no clubbing or cyanosis   ABDOMEN: Soft, non-tender to light palpation. Bowel sounds present. No palpable masses no organomegaly; no abdominal bruit  MS: Good muscle strength and tone. No atrophy or abnormal movements. : Deferred  SKIN: Warm and dry no statis dermatitis or ulcers   NEURO / PSYCH: Oriented to person, place and time. Speech clear and appropriate. Follows all commands. Pleasant affect     DATA:    EC2021: Atrial fibrillation with PVCs, NSSTT changes, rate 94 bpm.   Tele strips: A. fib with CVR  Diagnostic:      Intake/Output Summary (Last 24 hours) at 2021 1458  Last data filed at 2021 1200  Gross per 24 hour   Intake 2200.67 ml   Output 500 ml   Net 1700.67 ml       Labs:   CBC:   Recent Labs     21  1201 21  1201 21  0530 21  1136   WBC 7.1  --  6.7  --    HGB 4.3*   < > 8.1* 8.1*   HCT 15.7*   < > 27.4* 27.9*     --  170  --     < > = values in this interval not displayed.      BMP:   Recent Labs     21  0019 21  0530    144   K 3.9 3.7   CO2 33* 33*   BUN 24* 23   CREATININE 1.0 0.9   LABGLOM 54 >60   CALCIUM 8.4* 8.3*     Mag:   Recent Labs     21  1201   MG 1.8     proBNP:   Recent Labs     21  1201   PROBNP 2,977*     PT/INR:   Recent Labs     21  1201 PROTIME 32.2*   INR 2.7     CARDIAC ENZYMES:  Recent Labs     02/26/21  1201   TROPONINI <0.01     FASTING LIPID PANEL:  Lab Results   Component Value Date    CHOL 116 01/16/2019    HDL 30 01/16/2019    LDLCALC 63 01/16/2019    TRIG 114 01/16/2019     LIVER PROFILE:  Recent Labs     02/26/21  1201 02/27/21  0530   AST 13 22   ALT 7 8   LABALBU 3.0* 3.0*     TTE: 1/16/2019: Aortic valve opens well. No wall motion abnormalities. Ejection fraction is visually estimated at 60-65%. The left atrium is moderately dilated. Probable atrial fibrillation  Mild centrally directed mitral regurgitation. The aortic valve appears mildly sclerotic. No evidence of pericardial effusion. CXR: 2/26/2021  Cardiomegaly    Assessment/Plan to follow as per Dr. Kaylyn Alanis. Electronically signed by CHAPARRO Moss CNP on 2/27/21 at 3:31 PM EST       The above documentation has been prepared under my direction and personally reviewed by me in its entirety. I confirm that the note above accurately reflects all work, treatment, procedures, and medical decision making performed by me.     The patient's history was independently obtained. The patient was independently examined. Electrocardiogram, prior and present cardiovascular assessment, and laboratory studies were reviewed.     The patient is a 72-year-old white female known to [facility] with primary cardiovascular care provided by Virgilio Oneal as well as that of the electrophysiology service with primary care provided by Robert Seaman. 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 On the basis of associated sinus node dysfunction, reductions of her beta-blocker therapy have been necessitated.   She auscultation. Cardiac examination is notable for an irregular rhythm consistent about of atrial fibrillation with no identified cardiac murmur. A benign abdominal examination is present and no peripheral edema presently noted.     Diagnostic Assessment and Plan: On a clinical basis, the patient presents with recurrent episodes of reported hematochezia in the face of recent documented upper gastrointestinal bleeding and a recurrent anemia presently stabilized with transfusion. On this basis appropriate withholding of anticoagulation has occurred in spite of her recent cardioversion and embolic risk pending further evaluation of the surgical service regarding the source of bleeding and additional recommendations. In this regard, alteration of her oral anticoagulation may be appropriate with a conversion of her rivaroxaban to that of apixaban to reduce risk of bleeding while providing equivalent embolic protection and if additional recurrent bleeding is noted the consideration of a Watchman device which has been discussed at the time of evaluation. Her existing dofetilide will be maintained as well as at least on a short-term basis while in atrial fibrillation the resumption of oral anticoagulation presently without of metoprolol tartrate to assist in maintenance of rate control reduce risk of adverse consequences of tachycardia, especially in light of her previous cardiomyopathy.     Thank you for allowing me to participate in your patient's care. Please feel free to contact me if you have any questions or concerns.     Chago Corona [place], [address] [place] [address] Cardiology

## 2022-01-20 ENCOUNTER — OFFICE VISIT (OUTPATIENT)
Dept: CARDIOLOGY CLINIC | Age: 77
End: 2022-01-20
Payer: MEDICARE

## 2022-01-20 VITALS
DIASTOLIC BLOOD PRESSURE: 68 MMHG | BODY MASS INDEX: 24.23 KG/M2 | HEART RATE: 56 BPM | SYSTOLIC BLOOD PRESSURE: 102 MMHG | HEIGHT: 66 IN | WEIGHT: 150.8 LBS | RESPIRATION RATE: 16 BRPM

## 2022-01-20 DIAGNOSIS — I51.7 ATRIAL DILATATION, LEFT: ICD-10-CM

## 2022-01-20 DIAGNOSIS — Z79.01 CHRONIC ANTICOAGULATION: ICD-10-CM

## 2022-01-20 DIAGNOSIS — I48.92 PAROXYSMAL ATRIAL FLUTTER (HCC): Primary | ICD-10-CM

## 2022-01-20 DIAGNOSIS — I34.0 NONRHEUMATIC MITRAL VALVE REGURGITATION: ICD-10-CM

## 2022-01-20 DIAGNOSIS — I50.32 CHRONIC DIASTOLIC CONGESTIVE HEART FAILURE (HCC): ICD-10-CM

## 2022-01-20 DIAGNOSIS — I42.8 NICM (NONISCHEMIC CARDIOMYOPATHY) (HCC): ICD-10-CM

## 2022-01-20 PROCEDURE — 3017F COLORECTAL CA SCREEN DOC REV: CPT | Performed by: INTERNAL MEDICINE

## 2022-01-20 PROCEDURE — G8399 PT W/DXA RESULTS DOCUMENT: HCPCS | Performed by: INTERNAL MEDICINE

## 2022-01-20 PROCEDURE — 4040F PNEUMOC VAC/ADMIN/RCVD: CPT | Performed by: INTERNAL MEDICINE

## 2022-01-20 PROCEDURE — 93000 ELECTROCARDIOGRAM COMPLETE: CPT | Performed by: INTERNAL MEDICINE

## 2022-01-20 PROCEDURE — G8484 FLU IMMUNIZE NO ADMIN: HCPCS | Performed by: INTERNAL MEDICINE

## 2022-01-20 PROCEDURE — G8420 CALC BMI NORM PARAMETERS: HCPCS | Performed by: INTERNAL MEDICINE

## 2022-01-20 PROCEDURE — 99214 OFFICE O/P EST MOD 30 MIN: CPT | Performed by: INTERNAL MEDICINE

## 2022-01-20 PROCEDURE — 1090F PRES/ABSN URINE INCON ASSESS: CPT | Performed by: INTERNAL MEDICINE

## 2022-01-20 PROCEDURE — G8427 DOCREV CUR MEDS BY ELIG CLIN: HCPCS | Performed by: INTERNAL MEDICINE

## 2022-01-20 PROCEDURE — 1123F ACP DISCUSS/DSCN MKR DOCD: CPT | Performed by: INTERNAL MEDICINE

## 2022-01-20 PROCEDURE — 1036F TOBACCO NON-USER: CPT | Performed by: INTERNAL MEDICINE

## 2022-01-20 RX ORDER — WARFARIN SODIUM 5 MG/1
5 TABLET ORAL DAILY
COMMUNITY

## 2022-01-20 RX ORDER — WARFARIN SODIUM 3 MG/1
3 TABLET ORAL DAILY
COMMUNITY

## 2022-01-20 RX ORDER — WARFARIN SODIUM 1 MG/1
1 TABLET ORAL DAILY
COMMUNITY

## 2022-01-20 RX ORDER — M-VIT,TX,IRON,MINS/CALC/FOLIC 27MG-0.4MG
1 TABLET ORAL DAILY
COMMUNITY

## 2022-01-20 NOTE — PROGRESS NOTES
OUTPATIENT CARDIOLOGY FOLLOW-UP    Name: Susu White    Age: 78 y.o. Date of Service: 1/20/2022    Chief Complaint: Follow-up for atrial fibrillation/flutter, VHD, NICM    Referring Physician: Ryan Orona DO    History of Present Illness:  She denies chest pain, worsening SOB, palpitations, orthopnea, or syncope. COVID-19 infection in 10/2021.     Review of Systems:   Cardiac: As per HPI  General: No fever, chills  Pulmonary: As per HPI  HEENT: No visual disturbances, difficult swallowing  GI: No nausea, vomiting  : No dysuria, hematuria  Endocrine: No thyroid disease or DM  Musculoskeletal: STEWART x 4, no focal motor deficits  Skin: Intact, no rashes  Neuro: No headache, seizures  Psych: Currently with no depression, anxiety    Past Medical History:  Past Medical History:   Diagnosis Date    Acute on chronic combined systolic and diastolic congestive heart failure (HCC)     AF (atrial fibrillation) (HCC)     Anxiety     Atrial tachycardia ([address] 01/14/2021    Breast cancer ([facility]     right chest wall involvement    Depression     Hyperlipidemia     Lymphadenopathy     Osteoporosis     Radiation     Status post chemotherapy     neoadjuvant chemo with AC, Taxol, carboplatin, and Herceptin     Past Surgical History:  Past Surgical History:   Procedure Laterality Date    BRONCHOSCOPY  07/12/2012    ebus    CARDIAC CATHETERIZATION Right 04/03/2014    Dr. Rosalie Parks  11/16/2016    CARDIOVERSION  01/14/2021    Successful    (Dr. Reginaldo Toure)    COLONOSCOPY N/A 3/1/2021    COLONOSCOPY DIAGNOSTIC performed by Sabina Villalba MD at [address]      RT - 2010    MASTECTOMY Right 02/06/2009    TONSILLECTOMY      TUNNELED VENOUS PORT PLACEMENT      UPPER GASTROINTESTINAL ENDOSCOPY N/A 2/9/2021    EGD ESOPHAGOGASTRODUODENOSCOPY performed by Sabina Villalba MD at [address] N/A 3/1/2021    EGD ESOPHAGOGASTRODUODENOSCOPY performed by Malika Penny MD at [facility] ENDOSCOPY       Family History:  Family History   Problem Relation Age of Onset    Cancer Mother         female organs    Cancer Father         bowel? 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   Tobacco Use    Smoking status: Never Smoker    Smokeless tobacco: Never Used   Vaping Use    Vaping Use: Never used   Substance and Sexual Activity    Alcohol use: No    Drug use: No    Sexual activity: Never   Other Topics Concern    Not on file   Social History Narrative    Not on file     Social Determinants of Health     Financial Resource Strain:     Difficulty of Paying Living Expenses: Not on file   Food Insecurity:     Worried About Running Out of Food in the Last Year: Not on file    Faye of Food in the Last Year: Not on file   Transportation Needs:     Lack of Transportation (Medical): Not on file    Lack of Transportation (Non-Medical):  Not on file   Physical Activity:     Days of Exercise per Week: Not on file    Minutes of Exercise per Session: Not on file   Stress:     Feeling of Stress : Not on file   Social Connections:     Frequency of Communication with Friends and Family: Not on file    Frequency of Social Gatherings with Friends and Family: Not on file    Attends Congregational Services: Not on file    Active Member of Clubs or Organizations: Not on file    Attends Club or Organization Meetings: Not on file    Marital Status: Not on file   Intimate Partner Violence:     Fear of Current or Ex-Partner: Not on file    Emotionally Abused: Not on file    Physically Abused: Not on file    Sexually Abused: Not on file   Housing Stability:     Unable to Pay for Housing in the Last Year: Not on file    Number of Places Lived in the Last Year: Not on file    Unstable Housing in the Last Year: Not on file       Allergies: Allergies   Allergen Reactions    Sulfa Antibiotics        Home Medications:  Prior to Admission medications    Medication Sig Start Date End Date Taking?  Authorizing Provider   warfarin (COUMADIN) 3 MG tablet Take 3 mg by mouth daily   Yes Historical Provider, MD   warfarin (COUMADIN) 5 MG tablet Take 5 mg by mouth daily   Yes Historical Provider, MD   Multiple Vitamins-Minerals (THERAPEUTIC MULTIVITAMIN-MINERALS) tablet Take 1 tablet by mouth daily   Yes Historical Provider, MD   warfarin (COUMADIN) 1 MG tablet Take 1 mg by mouth daily   Yes Historical Provider, MD   metoprolol succinate (TOPROL XL) 25 MG extended release tablet Take 1 tablet by mouth 2 times daily  Patient taking differently: Take 25 mg by mouth daily  10/28/21  Yes Apollo Barahona DO   bumetanide (BUMEX) 1 MG tablet Take 1 tablet by mouth daily Indications: May Take Addional Dose if Needed 10/28/21  Yes Apollo Barahona DO   dofetilide (TIKOSYN) 250 MCG capsule Take 250 mcg by mouth 2 times daily    Yes Historical Provider, MD   ferrous sulfate (IRON 325) 325 (65 Fe) MG tablet Take 325 mg by mouth daily (with breakfast)   Yes Historical Provider, MD   pantoprazole (PROTONIX) 40 MG tablet Take 1 tablet by mouth every morning (before breakfast) 3/4/21  Yes Terrance Phenes, DO   sucralfate (CARAFATE) 1 GM tablet Take 1 tablet by mouth 3 times daily (with meals) 3/3/21  Yes Sersummer Phenes, DO   OXYGEN Inhale 2 L into the lungs nightly   Yes Historical Provider, MD   potassium chloride (KLOR-CON M) 20 MEQ extended release tablet Take 1 tablet by mouth daily 2/11/21  Yes Tyesha Eli MD   PARoxetine (PAXIL) 20 MG tablet Take 1 tablet by mouth every morning 10/4/17  Yes Jaclyn Hoffman MD   atorvastatin (LIPITOR) 10 MG tablet Take 10 mg by mouth nightly    Yes Historical Provider, MD   warfarin (COUMADIN) 4 MG tablet Take 1 tablet by mouth daily  Patient not taking: Reported on 1/20/2022 10/28/21   Ermalevijay Barahona,    ALPRAZolam Wyatt Gibbs) 0.25 MG tablet Take 0.25 mg by mouth daily as needed for Anxiety.    Patient not taking: Reported on 1/20/2022 3/24/16   Historical Provider, MD   calcium carbonate (OSCAL) 500 MG TABS tablet Take 500 mg by mouth every morning   Patient not taking: Reported on 1/20/2022    Historical Provider, MD       Current Medications:  Current Outpatient Medications   Medication Sig Dispense Refill    warfarin (COUMADIN) 3 MG tablet Take 3 mg by mouth daily      warfarin (COUMADIN) 5 MG tablet Take 5 mg by mouth daily      Multiple Vitamins-Minerals (THERAPEUTIC MULTIVITAMIN-MINERALS) tablet Take 1 tablet by mouth daily      warfarin (COUMADIN) 1 MG tablet Take 1 mg by mouth daily      metoprolol succinate (TOPROL XL) 25 MG extended release tablet Take 1 tablet by mouth 2 times daily (Patient taking differently: Take 25 mg by mouth daily ) 30 tablet 0    bumetanide (BUMEX) 1 MG tablet Take 1 tablet by mouth daily Indications: May Take Addional Dose if Needed 30 tablet 0    dofetilide (TIKOSYN) 250 MCG capsule Take 250 mcg by mouth 2 times daily       ferrous sulfate (IRON 325) 325 (65 Fe) MG tablet Take 325 mg by mouth daily (with breakfast)      pantoprazole (PROTONIX) 40 MG tablet Take 1 tablet by mouth every morning (before breakfast) 30 tablet 3    sucralfate (CARAFATE) 1 GM tablet Take 1 tablet by mouth 3 times daily (with meals) 120 tablet 3    OXYGEN Inhale 2 L into the lungs nightly      potassium chloride (KLOR-CON M) 20 MEQ extended release tablet Take 1 tablet by mouth daily 60 tablet 3    PARoxetine (PAXIL) 20 MG tablet Take 1 tablet by mouth every morning 90 tablet 1    atorvastatin (LIPITOR) 10 MG tablet Take 10 mg by mouth nightly       warfarin (COUMADIN) 4 MG tablet Take 1 tablet by mouth daily (Patient not taking: Reported on 1/20/2022) 30 tablet 0  ALPRAZolam (XANAX) 0.25 MG tablet Take 0.25 mg by mouth daily as needed for Anxiety. (Patient not taking: Reported on 1/20/2022)      calcium carbonate (OSCAL) 500 MG TABS tablet Take 500 mg by mouth every morning  (Patient not taking: Reported on 1/20/2022)       No current facility-administered medications for this visit. Physical Exam:  /68   Pulse 56   Resp 16   Ht 5' 6\" (1.676 m)   Wt 150 lb 12.8 oz (68.4 kg)   BMI 24.34 kg/m²   Wt Readings from Last 3 Encounters:   01/20/22 150 lb 12.8 oz (68.4 kg)   10/28/21 154 lb 4.8 oz (70 kg)   08/23/21 169 lb (76.7 kg)     Appearance: Awake, alert, no acute respiratory distress  Skin: Intact, no rash  Head: Normocephalic, atraumatic  Eyes: EOMI, no conjunctival erythema  ENMT: No pharyngeal erythema, MMM, no rhinorrhea  Neck: Supple, no carotid bruits  Lungs: Clear to auscultation bilaterally. No wheezes, rales, or rhonchi.   Cardiac: IRRR, no murmurs apparent  Abdomen: Soft, nontender, +bowel sounds  Extremities: Moves all extremities x 4, no lower extremity edema  Neurologic: No focal motor deficits apparent, normal mood and affect    Intake/Output:  No intake or output data in the 24 hours ending 01/20/22 1121  I/O this shift:  In: 180 [P.O.:180]  Out: -     Laboratory Tests:  Lab Results   Component Value Date    CREATININE 0.8 10/29/2021    BUN 29 (H) 10/29/2021     10/29/2021    K 4.1 10/29/2021    CL 96 (L) 10/29/2021    CO2 38 (H) 10/29/2021     Lab Results   Component Value Date    MG 2.1 10/26/2021     Lab Results   Component Value Date    ALT 26 10/29/2021    AST 25 10/29/2021    ALKPHOS 95 10/29/2021    BILITOT 1.2 10/29/2021     Lab Results   Component Value Date    WBC 8.1 10/25/2021    HGB 15.2 10/25/2021    HCT 50.6 (H) 10/25/2021    MCV 96.0 10/25/2021     10/25/2021     Lab Results   Component Value Date    TROPONINI <0.01 02/26/2021    TROPONINI <0.01 05/31/2019    TROPONINI 0.02 05/30/2019     Lab Results Component Value Date    INR 3.0 10/29/2021    INR 3.0 10/28/2021    INR 2.9 10/27/2021    PROTIME 32.5 (H) 10/29/2021    PROTIME 32.9 (H) 10/28/2021    PROTIME 32.0 (H) 10/27/2021     Lab Results   Component Value Date    TSH 1.180 10/19/2021     Lab Results   Component Value Date    CHOL 99 10/19/2021    CHOL 116 01/16/2019    CHOL 194 05/11/2015     Lab Results   Component Value Date    TRIG 102 10/19/2021    TRIG 114 01/16/2019    TRIG 166 (H) 05/11/2015     Lab Results   Component Value Date    HDL 28 10/19/2021    HDL 30 01/16/2019    HDL 28 05/11/2015     Lab Results   Component Value Date    LDLCALC 51 10/19/2021    LDLCALC 63 01/16/2019    LDLCALC 133 (H) 05/11/2015     Lab Results   Component Value Date    LABVLDL 20 10/19/2021    LABVLDL 23 01/16/2019    LABVLDL 33 05/11/2015     Cardiac Tests:  EKG (1/20/2022): SB, rate 56, ventricular bigeminy, QTc 453 msec    Echocardiogram: 11/2/16 (Dr. Leny Del Rio)   Left ventricular internal dimensions, wall thickness, regional wall motion   and systolic function are normal.   Ejection fraction is visually estimated at 55%.   There is Doppler evidence for stage III diastolic dysfunction.   The left atrium is severely enlarged as determined by the left atrial volume index.  Benson Kinney is evidence for impaired global right ventricular systolic function.   The right atrium appears to be enlarged.   The aortic valve appears mildly sclerotic.   Mild tricuspid regurgitation is present.   Estimated right ventricular systolic pressure is 40 - 44 mmHg and mildly     Echocardiogram: 1/16/19 (Dr. Malgorzata Romano)   Aortic valve opens well.   No wall motion abnormalities.   Ejection fraction is visually estimated at 60-65%.   Normal right ventricular size and function.   The left atrium is moderately dilated.   Markedly enlarged right atrium size.   Probable atrial fibrillation   Mild centrally directed mitral regurgitation.   The aortic valve appears mildly sclerotic.   Moderate tricuspid regurgitation.  RVSP is 68 mmHg.   TR velocity = 4.1 m/s   Pulmonary hypertension is severe . Echocardiogram: 7/1/21 (Dr. Joellen Ecsobar)   Normal left ventricular systolic function. Ejection fraction is visually estimated at 55-60%. Normal right ventricular size and function (TAPSE 1.8 cm). Indeterminate diastolic function. Severely dilated left atrium by volume index. Moderate mitral regurgitation. Moderate tricuspid regurgitation. Average PASP is estimated at 61 mmHg. Impression:   1. GI bleed (2/2021 hospitalization) --> hemorrhagic gastritis, sigmoid diverticulosis on 3/1/21 studies  2. Anemia -- Hgb 4.3 on admission (2/2021) --> PRBC's --> 8.7 --> 7.9 --> 8.0 --> --> --> most recent Hgb 15.2  3. Prior history of syncope / orthostatic hypotension (5/2019) -- +nausea and poor po intake prior to episode; clinically improved; no recent episodes  4. Persistent atrial fibrillation -- Tikosyn stopped by EP in 5/2021, but resumed by primary service during 10/2021 hospitalization / on  / [facility] recently switched to coumadin / follows with EP  5. NICM (r/o prior tachycardia-induced CM component) -- EF improved on most recent echocardiograms  6. Negative cardiac catheterization in 4/2014  7. Chronic HFpEF  8. Moderate MR and TR / PHTN  9. Severely dilated LA  10. Right-sided breast cancer s/p prior treatment  11. COVID-19 infection in 10/2021 (hospitalized)    - Continue metoprolol  - Follow-up with EP re: treatment of atrial arrhythmia  - Currently on coumadin -- recently work-up for LAAO  - Keep K > 4 and Mg > 2 / monitor renal function on bumex  - Monitor CBC  - Serial echocardiograms  - Case discussed with the patient and her granddaughter today    Greater than 30 minutes was spent counseling the patient, reviewing the rationale for the above recommendations and reviewing the patient's current medication list, problem list and results of all previously ordered testing.     Krys Mondragon MD  [id number] [facility]

## 2022-01-25 RX ORDER — DOFETILIDE 0.25 MG/1
CAPSULE ORAL
Qty: 180 CAPSULE | Refills: 1 | Status: SHIPPED | OUTPATIENT
Start: 2022-01-25

## 2022-01-25 NOTE — TELEPHONE ENCOUNTER
Requesting Tikosyn refill - CrCl calculated off the following information:    Tikosyn dosage: 250 mcg bid    Age: 76   Ht: 1.676 m  Wt: 68.4 kg  Cr: 0.8 mg/dl (based off labs on 10/21)  CrCl: 57.72 mL/min    Last QTc: 453 msec (based on last EKG on 1/22)    Reviewed by ep rn

## 2022-02-14 NOTE — ED NOTES
Bed: 12  Expected date:   Expected time:   Means of arrival:   Comments:  syncope     Sena Rodriguez RN  05/30/19 [id number] soft/tender

## 2024-03-02 NOTE — TELEPHONE ENCOUNTER
Pt calling for HFU apt/timing with Dr. Jimena Davenport - syncope - from 5/31/19. 4 = No assist / stand by assistance

## (undated) DEVICE — GRADUATE TRIANG MEASURE 1000ML BLK PRNT

## (undated) DEVICE — 4-PORT MANIFOLD: Brand: NEPTUNE 2

## (undated) DEVICE — BLOCK BITE 60FR RUBBER ADLT DENTAL

## (undated) DEVICE — SPONGE GZ W4XL4IN RAYON POLY FILL CVR W/ NONWOVEN FAB

## (undated) DEVICE — DOUBLE BASIN SET: Brand: MEDLINE INDUSTRIES, INC.

## (undated) DEVICE — MARKER,SKIN,WI/RULER AND LABELS: Brand: MEDLINE

## (undated) DEVICE — GLOVE ORTHO 8   MSG9480

## (undated) DEVICE — TOWEL,OR,DSP,ST,BLUE,STD,6/PK,12PK/CS: Brand: MEDLINE